# Patient Record
Sex: FEMALE | Race: WHITE | NOT HISPANIC OR LATINO | Employment: PART TIME | URBAN - METROPOLITAN AREA
[De-identification: names, ages, dates, MRNs, and addresses within clinical notes are randomized per-mention and may not be internally consistent; named-entity substitution may affect disease eponyms.]

---

## 2017-02-16 ENCOUNTER — TELEPHONE (OUTPATIENT)
Dept: INTERNAL MEDICINE | Facility: CLINIC | Age: 77
End: 2017-02-16

## 2017-02-16 RX ORDER — HYDROCODONE BITARTRATE AND ACETAMINOPHEN 5; 325 MG/1; MG/1
1 TABLET ORAL EVERY 6 HOURS PRN
Qty: 30 TABLET | Refills: 0 | Status: SHIPPED | OUTPATIENT
Start: 2017-02-16 | End: 2017-06-29 | Stop reason: SDUPTHER

## 2017-03-16 RX ORDER — MELOXICAM 15 MG/1
TABLET ORAL
Qty: 30 TABLET | Refills: 0 | Status: SHIPPED | OUTPATIENT
Start: 2017-03-16 | End: 2017-06-29 | Stop reason: SDUPTHER

## 2017-03-28 ENCOUNTER — OFFICE VISIT (OUTPATIENT)
Dept: ORTHOPEDIC SURGERY | Facility: CLINIC | Age: 77
End: 2017-03-28

## 2017-03-28 VITALS — WEIGHT: 118 LBS | HEIGHT: 62 IN | TEMPERATURE: 98.9 F | BODY MASS INDEX: 21.71 KG/M2

## 2017-03-28 DIAGNOSIS — M19.90 ARTHRITIS: Primary | ICD-10-CM

## 2017-03-28 PROCEDURE — 20610 DRAIN/INJ JOINT/BURSA W/O US: CPT | Performed by: ORTHOPAEDIC SURGERY

## 2017-03-28 RX ADMIN — BUPIVACAINE HYDROCHLORIDE 4 ML: 5 INJECTION, SOLUTION PERINEURAL at 12:18

## 2017-03-28 RX ADMIN — METHYLPREDNISOLONE ACETATE 80 MG: 80 INJECTION, SUSPENSION INTRA-ARTICULAR; INTRALESIONAL; INTRAMUSCULAR; SOFT TISSUE at 12:18

## 2017-03-28 RX ADMIN — BUPIVACAINE HYDROCHLORIDE 4 ML: 5 INJECTION, SOLUTION PERINEURAL at 12:23

## 2017-03-28 RX ADMIN — METHYLPREDNISOLONE ACETATE 80 MG: 80 INJECTION, SUSPENSION INTRA-ARTICULAR; INTRALESIONAL; INTRAMUSCULAR; SOFT TISSUE at 12:23

## 2017-03-28 NOTE — PROGRESS NOTES
"BILATERALKnee Joint Injection      Patient: Trice Melara        YOB: 1940            Chief Complaints:BILATERAL Knee pain      History of Present Illness: Pt gets intermittent  injections with good relief. Is here for repeat injection. Understands options.  Chief Complaint   Patient presents with   • Left Knee - Follow-up, Pain   • Right Knee - Follow-up, Pain         Physical Exam: 76 y.o. female  General Appearance:    Alert, cooperative, in no acute distress                   Vitals:    03/28/17 1216   Temp: 98.9 °F (37.2 °C)   Weight: 118 lb (53.5 kg)   Height: 62\" (157.5 cm)      Patient is alert and read ×3 no acute distress appears her above-listed at height weight and age.  Affect is normal respiratory rate is normal unlabored. Heart rate regular rate rhythm, sclera, dentition and hearing are normal for the purpose of this exam.  Exam and complaints are unchanged.      Procedure:  Large Joint Arthrocentesis  Date/Time: 3/28/2017 12:18 PM  Consent given by: patient  Timeout: Immediately prior to procedure a time out was called to verify the correct patient, procedure, equipment, support staff and site/side marked as required   Supporting Documentation  Indications: pain   Procedure Details  Location: knee - R knee  Needle size: 18 G  Approach: anteromedial  Medications administered: 4 mL bupivacaine; 80 mg methylPREDNISolone acetate 80 MG/ML      Large Joint Arthrocentesis  Date/Time: 3/28/2017 12:23 PM  Consent given by: patient  Site marked: site marked  Timeout: Immediately prior to procedure a time out was called to verify the correct patient, procedure, equipment, support staff and site/side marked as required   Supporting Documentation  Indications: pain   Procedure Details  Location: knee - L knee  Needle size: 18 G  Approach: anteromedial  Medications administered: 4 mL bupivacaine; 80 mg methylPREDNISolone acetate 80 MG/ML                  Assessment. Persistent knee pain      Plan: Is " to proceed with injection    The knee joint was injected under strict sterile technique with Marcaine and Depo-Medrol this was done sterilely and tolerated tolerated well.

## 2017-03-29 RX ORDER — BUPIVACAINE HYDROCHLORIDE 5 MG/ML
4 INJECTION, SOLUTION PERINEURAL
Status: COMPLETED | OUTPATIENT
Start: 2017-03-28 | End: 2017-03-28

## 2017-03-29 RX ORDER — METHYLPREDNISOLONE ACETATE 80 MG/ML
80 INJECTION, SUSPENSION INTRA-ARTICULAR; INTRALESIONAL; INTRAMUSCULAR; SOFT TISSUE
Status: COMPLETED | OUTPATIENT
Start: 2017-03-28 | End: 2017-03-28

## 2017-06-27 ENCOUNTER — CLINICAL SUPPORT (OUTPATIENT)
Dept: ORTHOPEDIC SURGERY | Facility: CLINIC | Age: 77
End: 2017-06-27

## 2017-06-27 VITALS — WEIGHT: 129.4 LBS | TEMPERATURE: 98.6 F | BODY MASS INDEX: 23.81 KG/M2 | HEIGHT: 62 IN

## 2017-06-27 DIAGNOSIS — M17.10 OSTEOARTHROSIS, LOCALIZED, PRIMARY, KNEE, UNSPECIFIED LATERALITY: Primary | ICD-10-CM

## 2017-06-27 PROCEDURE — 20610 DRAIN/INJ JOINT/BURSA W/O US: CPT | Performed by: ORTHOPAEDIC SURGERY

## 2017-06-27 RX ADMIN — METHYLPREDNISOLONE ACETATE 80 MG: 80 INJECTION, SUSPENSION INTRA-ARTICULAR; INTRALESIONAL; INTRAMUSCULAR; SOFT TISSUE at 09:56

## 2017-06-27 RX ADMIN — BUPIVACAINE HYDROCHLORIDE 4 ML: 5 INJECTION, SOLUTION EPIDURAL; INTRACAUDAL at 09:56

## 2017-06-27 NOTE — PROGRESS NOTES
"Knee Joint Injection      Patient: Trice Melara        YOB: 1940            Chief Complaints: Knee pain bilateral      History of Present Illness: Pt gets intermittent  injections with good relief. Is here for repeat injection. Understands options.      Physical Exam: 76 y.o. female  General Appearance:    Alert, cooperative, in no acute distress                   Vitals:    06/27/17 1229   Temp: 98.6 °F (37 °C)   Weight: 129 lb 6.4 oz (58.7 kg)   Height: 62\" (157.5 cm)      Patient is alert and read ×3 no acute distress appears her above-listed at height weight and age.  Affect is normal respiratory rate is normal unlabored. Heart rate regular rate rhythm, sclera, dentition and hearing are normal for the purpose of this exam.  Exam and complaints are unchanged.      Procedure:  Large Joint Arthrocentesis  Date/Time: 6/27/2017 9:56 AM  Consent given by: patient  Site marked: site marked  Timeout: Immediately prior to procedure a time out was called to verify the correct patient, procedure, equipment, support staff and site/side marked as required   Supporting Documentation  Indications: pain   Procedure Details  Location: knee - R knee  Needle size: 25 G  Approach: anteromedial  Medications administered: 80 mg methylPREDNISolone acetate 80 MG/ML; 4 mL bupivacaine (PF) 0.5 %      Large Joint Arthrocentesis  Date/Time: 6/27/2017 9:56 AM  Consent given by: patient  Site marked: site marked  Timeout: Immediately prior to procedure a time out was called to verify the correct patient, procedure, equipment, support staff and site/side marked as required   Supporting Documentation  Indications: pain   Procedure Details  Location: knee - L knee  Needle size: 25 G  Approach: anteromedial  Medications administered: 80 mg methylPREDNISolone acetate 80 MG/ML; 4 mL bupivacaine (PF) 0.5 %                  Assessment. Persistent knee pain      Plan: Is to proceed with injection    The knee joint was injected under " strict sterile technique with Marcaine and Depo-Medrol this was done sterilely and tolerated tolerated well.She would like to talk to Dr. Glover about possible knee replacement

## 2017-06-27 NOTE — PROGRESS NOTES
".  BILATERAL Knee Follow Up      Patient: Trice Melara        YOB: 1940            Chief Complaints: Bilateralknee pain      History of Present Illness:      Physical Exam: 76 y.o. female  General Appearance:    Alert, cooperative, in no acute distress                   Vitals:    06/27/17 1229   Temp: 98.6 °F (37 °C)   Weight: 129 lb 6.4 oz (58.7 kg)   Height: 62\" (157.5 cm)        Patient is alert and read ×3 no acute distress appears her above-listed at height weight and age.  Affect is normal respiratory rate is normal unlabored. Heart rate regular rate rhythm, sclera, dentition and hearing are normal for the purpose of this exam.      Ortho Exam                 Assessment/Plan:              "

## 2017-06-29 ENCOUNTER — OFFICE VISIT (OUTPATIENT)
Dept: INTERNAL MEDICINE | Facility: CLINIC | Age: 77
End: 2017-06-29

## 2017-06-29 VITALS
RESPIRATION RATE: 18 BRPM | DIASTOLIC BLOOD PRESSURE: 76 MMHG | OXYGEN SATURATION: 97 % | BODY MASS INDEX: 23.55 KG/M2 | HEART RATE: 91 BPM | SYSTOLIC BLOOD PRESSURE: 128 MMHG | WEIGHT: 128 LBS | HEIGHT: 62 IN

## 2017-06-29 DIAGNOSIS — R79.89 ABNORMAL CBC: ICD-10-CM

## 2017-06-29 DIAGNOSIS — Z51.81 MEDICATION MONITORING ENCOUNTER: ICD-10-CM

## 2017-06-29 DIAGNOSIS — M17.0 PRIMARY OSTEOARTHRITIS OF KNEES, BILATERAL: Primary | ICD-10-CM

## 2017-06-29 DIAGNOSIS — Z12.31 ENCOUNTER FOR SCREENING MAMMOGRAM FOR BREAST CANCER: ICD-10-CM

## 2017-06-29 DIAGNOSIS — Z23 NEED FOR PNEUMOCOCCAL VACCINATION: ICD-10-CM

## 2017-06-29 LAB
ALBUMIN SERPL-MCNC: 4.5 G/DL (ref 3.5–5.2)
ALBUMIN/GLOB SERPL: 1.7 G/DL
ALP SERPL-CCNC: 62 U/L (ref 39–117)
ALT SERPL-CCNC: 16 U/L (ref 1–33)
AST SERPL-CCNC: 20 U/L (ref 1–32)
BASOPHILS # BLD AUTO: 0.02 10*3/MM3 (ref 0–0.2)
BASOPHILS NFR BLD AUTO: 0.1 % (ref 0–1.5)
BILIRUB SERPL-MCNC: 0.3 MG/DL (ref 0.1–1.2)
BUN SERPL-MCNC: 22 MG/DL (ref 8–23)
BUN/CREAT SERPL: 25.9 (ref 7–25)
CALCIUM SERPL-MCNC: 10 MG/DL (ref 8.6–10.5)
CHLORIDE SERPL-SCNC: 100 MMOL/L (ref 98–107)
CO2 SERPL-SCNC: 29.2 MMOL/L (ref 22–29)
CREAT SERPL-MCNC: 0.85 MG/DL (ref 0.57–1)
EOSINOPHIL # BLD AUTO: 0.02 10*3/MM3 (ref 0–0.7)
EOSINOPHIL NFR BLD AUTO: 0.1 % (ref 0.3–6.2)
ERYTHROCYTE [DISTWIDTH] IN BLOOD BY AUTOMATED COUNT: 14 % (ref 11.7–13)
GLOBULIN SER CALC-MCNC: 2.7 GM/DL
GLUCOSE SERPL-MCNC: 91 MG/DL (ref 65–99)
HCT VFR BLD AUTO: 43.6 % (ref 35.6–45.5)
HGB BLD-MCNC: 13.6 G/DL (ref 11.9–15.5)
IMM GRANULOCYTES # BLD: 0.06 10*3/MM3 (ref 0–0.03)
IMM GRANULOCYTES NFR BLD: 0.4 % (ref 0–0.5)
LYMPHOCYTES # BLD AUTO: 1.1 10*3/MM3 (ref 0.9–4.8)
LYMPHOCYTES NFR BLD AUTO: 7.6 % (ref 19.6–45.3)
MCH RBC QN AUTO: 28.5 PG (ref 26.9–32)
MCHC RBC AUTO-ENTMCNC: 31.2 G/DL (ref 32.4–36.3)
MCV RBC AUTO: 91.4 FL (ref 80.5–98.2)
MONOCYTES # BLD AUTO: 0.87 10*3/MM3 (ref 0.2–1.2)
MONOCYTES NFR BLD AUTO: 6 % (ref 5–12)
NEUTROPHILS # BLD AUTO: 12.49 10*3/MM3 (ref 1.9–8.1)
NEUTROPHILS NFR BLD AUTO: 85.8 % (ref 42.7–76)
PLATELET # BLD AUTO: 277 10*3/MM3 (ref 140–500)
POTASSIUM SERPL-SCNC: 4.7 MMOL/L (ref 3.5–5.2)
PROT SERPL-MCNC: 7.2 G/DL (ref 6–8.5)
RBC # BLD AUTO: 4.77 10*6/MM3 (ref 3.9–5.2)
SODIUM SERPL-SCNC: 143 MMOL/L (ref 136–145)
WBC # BLD AUTO: 14.56 10*3/MM3 (ref 4.5–10.7)

## 2017-06-29 PROCEDURE — 90670 PCV13 VACCINE IM: CPT | Performed by: INTERNAL MEDICINE

## 2017-06-29 PROCEDURE — G0009 ADMIN PNEUMOCOCCAL VACCINE: HCPCS | Performed by: INTERNAL MEDICINE

## 2017-06-29 PROCEDURE — 99214 OFFICE O/P EST MOD 30 MIN: CPT | Performed by: INTERNAL MEDICINE

## 2017-06-29 RX ORDER — BUPIVACAINE HYDROCHLORIDE 5 MG/ML
4 INJECTION, SOLUTION EPIDURAL; INTRACAUDAL
Status: COMPLETED | OUTPATIENT
Start: 2017-06-27 | End: 2017-06-27

## 2017-06-29 RX ORDER — HYDROCODONE BITARTRATE AND ACETAMINOPHEN 5; 325 MG/1; MG/1
1 TABLET ORAL EVERY 6 HOURS PRN
Qty: 30 TABLET | Refills: 0 | Status: ON HOLD | OUTPATIENT
Start: 2017-06-29 | End: 2017-07-27

## 2017-06-29 RX ORDER — METHYLPREDNISOLONE ACETATE 80 MG/ML
80 INJECTION, SUSPENSION INTRA-ARTICULAR; INTRALESIONAL; INTRAMUSCULAR; SOFT TISSUE
Status: COMPLETED | OUTPATIENT
Start: 2017-06-27 | End: 2017-06-27

## 2017-06-29 RX ORDER — MELOXICAM 15 MG/1
TABLET ORAL
Qty: 30 TABLET | Refills: 6 | Status: SHIPPED | OUTPATIENT
Start: 2017-06-29 | End: 2017-07-24 | Stop reason: SDUPTHER

## 2017-06-29 NOTE — PROGRESS NOTES
Chief Complaint  Trice Melara is a 76 y.o. female who presents for Med Refill (Pt wanting her norco refilled, has not been seen in over a year, but her narc was updated in february when she picked it up in office. (Also needs meloxicam refill))  .    History of Present Illness   Trice is here for follow up.  She hasn't been in in over a year.  She really doesn't like coming to the doctor.  She was in the hospital last year.  She had a follow up here and labs were ordered but I don't see any results in the system.  Her knees are her biggest issue.   She takes the hydrocodone in the evening if she is going out.  She might use it once a week.  She is also using meloxicam for her knees daily.  She's been on this for a long time.  She hasn't had any issues with bleeding.  She denies any cp or palp or dizziness.        Review of Systems   Constitutional: Negative for unexpected weight change.   Respiratory: Negative for chest tightness and shortness of breath.    Cardiovascular: Negative for chest pain and palpitations.   Musculoskeletal: Positive for arthralgias.   Neurological: Negative for dizziness and light-headedness.       Patient Active Problem List   Diagnosis   • Gastroesophageal reflux disease   • Knee pain   • Iron deficiency anemia   • Abnormal kidney function   • Acute renal failure   • Arthritis of knee   • Arthritis   • Osteoarthrosis, localized, primary, knee   • Primary osteoarthritis of knees, bilateral       Past Medical History:   Diagnosis Date   • Abdominal wall dehiscence 01/08/2016    Fascial Dehiscence   • Acute renal failure    • Aspiration pneumonia    • Diverticulitis 2010    with Colovesical Fistula   • Gastric ulcer    • GERD (gastroesophageal reflux disease)    • Incisional hernia 01/08/2016   • Osteoarthritis    • Peritonitis 01/2016   • Septic shock    • Small bowel anastomotic leak 01/08/2016       Past Surgical History:   Procedure Laterality Date   • COLON RESECTION  01/08/2016     Smal Bowel Resection x2-Dr. Syd Maharaj   • EXPLORATORY LAPAROTOMY  12/31/2015    With Extensive Adhesiolysis of over 1 hour to allow conduct of the operation and repair of small intestine-Dr. Syd Maharaj   • EXPLORATORY LAPAROTOMY  01/08/2016    Dr. Syd Maharaj   • INCISIONAL HERNIA REPAIR  01/08/2016    Incisional Hernia Repair with Fascial Dehiscence Repair with Mesh and Bilateral Component Separation-Dr. Syd Maharaj   • TOTAL ABDOMINAL HYSTERECTOMY         No family history on file.    Social History     Social History   • Marital status:      Spouse name: N/A   • Number of children: N/A   • Years of education: N/A     Occupational History   • Not on file.     Social History Main Topics   • Smoking status: Current Some Day Smoker     Types: Cigarettes   • Smokeless tobacco: Not on file   • Alcohol use Yes      Comment: rare   • Drug use: No   • Sexual activity: Defer     Other Topics Concern   • Not on file     Social History Narrative       Current Outpatient Prescriptions on File Prior to Visit   Medication Sig Dispense Refill   • acetaminophen (TYLENOL) 325 MG tablet Take 650 mg by mouth every 6 (six) hours as needed for mild pain (1-3).     • Calcium Carbonate Antacid (TUMS PO) Take  by mouth.     • Calcium Carbonate-Vit D-Min (CALCIUM 600+D PLUS MINERALS) 600-400 MG-UNIT chewable tablet Chew daily.     • diphenoxylate-atropine (LOMOTIL) 2.5-0.025 MG per tablet Take 1 tablet by mouth 2 (two) times a day. 1 in the morning  1 @@ night PRN     • omeprazole (PriLOSEC) 20 MG capsule TAKE 1 CAPSULE DAILY 90 capsule 3   • [DISCONTINUED] HYDROcodone-acetaminophen (NORCO) 5-325 MG per tablet Take 1 tablet by mouth Every 6 (Six) Hours As Needed for moderate pain (4-6). 30 tablet 0   • [DISCONTINUED] meloxicam (MOBIC) 15 MG tablet TAKE 1 TABLET BY MOUTH DAILY. PT NEEDS TO BE SEEN FOR MORE REFILLS. 30 tablet 0     No current facility-administered medications on file prior to visit.        No Known  "Allergies    Vitals:    06/29/17 1015   BP: 128/76   Pulse: 91   Resp: 18   SpO2: 97%   Weight: 128 lb (58.1 kg)   Height: 62\" (157.5 cm)       Body mass index is 23.41 kg/(m^2).    Objective   Physical Exam   Constitutional: She is oriented to person, place, and time. She appears well-developed and well-nourished. No distress.   HENT:   Head: Normocephalic and atraumatic.   Eyes: Conjunctivae are normal. No scleral icterus.   Neck: Normal range of motion. Neck supple.   Cardiovascular: Normal rate, regular rhythm and normal heart sounds.  Exam reveals no gallop and no friction rub.    No murmur heard.  Pulmonary/Chest: Effort normal and breath sounds normal. No respiratory distress. She has no wheezes. She has no rales.   Musculoskeletal: She exhibits no edema.   Lymphadenopathy:     She has no cervical adenopathy.   Neurological: She is alert and oriented to person, place, and time. No cranial nerve deficit.   Psychiatric: She has a normal mood and affect. Her behavior is normal. Judgment and thought content normal.           Assessment/Plan   Diagnoses and all orders for this visit:    Abnormal CBC  -     CBC & Differential    Encounter for screening mammogram for breast cancer  -     Mammo Screening Bilateral With CAD; Future    Primary osteoarthritis of knees, bilateral    Medication monitoring encounter  -     Comprehensive Metabolic Panel  -     CBC & Differential    Need for pneumococcal vaccination  -     Pneumococcal Conjugate Vaccine 13-[facility]    Other orders  -     HYDROcodone-acetaminophen (NORCO) 5-325 MG per tablet; Take 1 tablet by mouth Every 6 (Six) Hours As Needed for Moderate Pain (4-6).  -     meloxicam (MOBIC) 15 MG tablet; TAKE 1 TABLET BY MOUTH DAILY. PT NEEDS TO BE SEEN FOR MORE REFILLS.      Discussion/Summary  Trice is here for follow up.   She is generally doing very well.  No new issues.  She is due for labs and for a mammo.  I have put that order in.  Prevnar today.  She sees  " Kavon regularly for her knees.  She uses the hydrocodone sparingly without any side effect.  I am concerned about her daily use of meloxicam with her hx of GIB and the potential to affect kidney function over time.  I had ordered labs oin 3/2016 at her follow up but we never got any results.  Lab Patricia is looking into whether these were actually done and if there are labs available.  Will check labs today.  She did have them drawn.      Return in about 1 year (around 6/29/2018) for Annual physical, with fasting labs prior.

## 2017-06-30 ENCOUNTER — TELEPHONE (OUTPATIENT)
Dept: INTERNAL MEDICINE | Facility: CLINIC | Age: 77
End: 2017-06-30

## 2017-06-30 DIAGNOSIS — D72.829 ELEVATED WHITE BLOOD CELL COUNT, UNSPECIFIED: Primary | ICD-10-CM

## 2017-07-13 ENCOUNTER — HOSPITAL ENCOUNTER (OUTPATIENT)
Dept: MAMMOGRAPHY | Facility: HOSPITAL | Age: 77
Discharge: HOME | End: 2017-07-13
Admitting: INTERNAL MEDICINE

## 2017-07-13 DIAGNOSIS — Z12.31 ENCOUNTER FOR SCREENING MAMMOGRAM FOR BREAST CANCER: ICD-10-CM

## 2017-07-13 PROCEDURE — G0202 SCR MAMMO BI INCL CAD: HCPCS

## 2017-07-24 ENCOUNTER — HOSPITAL ENCOUNTER (INPATIENT)
Facility: HOSPITAL | Age: 77
LOS: 3 days | Discharge: HOME HEALTH | End: 2017-07-27
Attending: EMERGENCY MEDICINE | Admitting: INTERNAL MEDICINE

## 2017-07-24 ENCOUNTER — ANESTHESIA EVENT (OUTPATIENT)
Dept: PERIOP | Facility: HOSPITAL | Age: 77
End: 2017-07-24

## 2017-07-24 ENCOUNTER — APPOINTMENT (OUTPATIENT)
Dept: GENERAL RADIOLOGY | Facility: HOSPITAL | Age: 77
End: 2017-07-24

## 2017-07-24 ENCOUNTER — ANESTHESIA (OUTPATIENT)
Dept: PERIOP | Facility: HOSPITAL | Age: 77
End: 2017-07-24

## 2017-07-24 DIAGNOSIS — R26.2 DIFFICULTY WALKING: ICD-10-CM

## 2017-07-24 DIAGNOSIS — S72.141A CLOSED DISPLACED INTERTROCHANTERIC FRACTURE OF RIGHT FEMUR, INITIAL ENCOUNTER (HCC): Primary | ICD-10-CM

## 2017-07-24 PROBLEM — M81.0 OSTEOPOROSIS: Status: ACTIVE | Noted: 2017-07-24
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
## 2017-07-24 LAB
ALBUMIN SERPL-MCNC: 4 G/DL (ref 3.5–5.2)
ALBUMIN/GLOB SERPL: 1.3 G/DL
ALP SERPL-CCNC: 62 U/L (ref 39–117)
ALT SERPL W P-5'-P-CCNC: 19 U/L (ref 1–33)
ANION GAP SERPL CALCULATED.3IONS-SCNC: 10.9 MMOL/L
AST SERPL-CCNC: 18 U/L (ref 1–32)
BASOPHILS # BLD AUTO: 0.04 10*3/MM3 (ref 0–0.2)
BASOPHILS NFR BLD AUTO: 0.5 % (ref 0–1.5)
BILIRUB SERPL-MCNC: 0.4 MG/DL (ref 0.1–1.2)
BUN BLD-MCNC: 15 MG/DL (ref 8–23)
BUN/CREAT SERPL: 21.1 (ref 7–25)
CALCIUM SPEC-SCNC: 9.6 MG/DL (ref 8.6–10.5)
CHLORIDE SERPL-SCNC: 101 MMOL/L (ref 98–107)
CO2 SERPL-SCNC: 30.1 MMOL/L (ref 22–29)
CREAT BLD-MCNC: 0.71 MG/DL (ref 0.57–1)
DEPRECATED RDW RBC AUTO: 45.8 FL (ref 37–54)
EOSINOPHIL # BLD AUTO: 0.25 10*3/MM3 (ref 0–0.7)
EOSINOPHIL NFR BLD AUTO: 3.2 % (ref 0.3–6.2)
ERYTHROCYTE [DISTWIDTH] IN BLOOD BY AUTOMATED COUNT: 13.7 % (ref 11.7–13)
GFR SERPL CREATININE-BSD FRML MDRD: 79 ML/MIN/1.73
GLOBULIN UR ELPH-MCNC: 3.1 GM/DL
GLUCOSE BLD-MCNC: 106 MG/DL (ref 65–99)
HCT VFR BLD AUTO: 44.4 % (ref 35.6–45.5)
HGB BLD-MCNC: 13.9 G/DL (ref 11.9–15.5)
IMM GRANULOCYTES # BLD: 0.07 10*3/MM3 (ref 0–0.03)
IMM GRANULOCYTES NFR BLD: 0.9 % (ref 0–0.5)
INR PPP: 0.98 (ref 0.9–1.1)
LYMPHOCYTES # BLD AUTO: 1.47 10*3/MM3 (ref 0.9–4.8)
LYMPHOCYTES NFR BLD AUTO: 18.7 % (ref 19.6–45.3)
MCH RBC QN AUTO: 28.4 PG (ref 26.9–32)
MCHC RBC AUTO-ENTMCNC: 31.3 G/DL (ref 32.4–36.3)
MCV RBC AUTO: 90.8 FL (ref 80.5–98.2)
MONOCYTES # BLD AUTO: 0.7 10*3/MM3 (ref 0.2–1.2)
MONOCYTES NFR BLD AUTO: 8.9 % (ref 5–12)
NEUTROPHILS # BLD AUTO: 5.33 10*3/MM3 (ref 1.9–8.1)
NEUTROPHILS NFR BLD AUTO: 67.8 % (ref 42.7–76)
PLATELET # BLD AUTO: 173 10*3/MM3 (ref 140–500)
PMV BLD AUTO: 10.1 FL (ref 6–12)
POTASSIUM BLD-SCNC: 3.7 MMOL/L (ref 3.5–5.2)
PROT SERPL-MCNC: 7.1 G/DL (ref 6–8.5)
PROTHROMBIN TIME: 12.6 SECONDS (ref 11.7–14.2)
RBC # BLD AUTO: 4.89 10*6/MM3 (ref 3.9–5.2)
SODIUM BLD-SCNC: 142 MMOL/L (ref 136–145)
WBC NRBC COR # BLD: 7.86 10*3/MM3 (ref 4.5–10.7)

## 2017-07-24 PROCEDURE — 99222 1ST HOSP IP/OBS MODERATE 55: CPT | Performed by: ORTHOPAEDIC SURGERY

## 2017-07-24 PROCEDURE — 25010000003 CEFAZOLIN IN DEXTROSE 2-4 GM/100ML-% SOLUTION: Performed by: ORTHOPAEDIC SURGERY

## 2017-07-24 PROCEDURE — 27245 TREAT THIGH FRACTURE: CPT | Performed by: ORTHOPAEDIC SURGERY

## 2017-07-24 PROCEDURE — 0QSB06Z REPOSITION RIGHT LOWER FEMUR WITH INTRAMEDULLARY INTERNAL FIXATION DEVICE, OPEN APPROACH: ICD-10-PCS | Performed by: ORTHOPAEDIC SURGERY

## 2017-07-24 PROCEDURE — 85610 PROTHROMBIN TIME: CPT | Performed by: PHYSICIAN ASSISTANT

## 2017-07-24 PROCEDURE — 73502 X-RAY EXAM HIP UNI 2-3 VIEWS: CPT

## 2017-07-24 PROCEDURE — C1713 ANCHOR/SCREW BN/BN,TIS/BN: HCPCS | Performed by: ORTHOPAEDIC SURGERY

## 2017-07-24 PROCEDURE — 25010000002 FENTANYL CITRATE (PF) 100 MCG/2ML SOLUTION: Performed by: ANESTHESIOLOGY

## 2017-07-24 PROCEDURE — 94799 UNLISTED PULMONARY SVC/PX: CPT

## 2017-07-24 PROCEDURE — 80053 COMPREHEN METABOLIC PANEL: CPT | Performed by: PHYSICIAN ASSISTANT

## 2017-07-24 PROCEDURE — 25010000002 DEXAMETHASONE PER 1 MG: Performed by: ANESTHESIOLOGY

## 2017-07-24 PROCEDURE — 76000 FLUOROSCOPY <1 HR PHYS/QHP: CPT

## 2017-07-24 PROCEDURE — 85025 COMPLETE CBC W/AUTO DIFF WBC: CPT | Performed by: PHYSICIAN ASSISTANT

## 2017-07-24 PROCEDURE — 25010000002 PROPOFOL 10 MG/ML EMULSION: Performed by: ANESTHESIOLOGY

## 2017-07-24 PROCEDURE — 71010 HC CHEST PA OR AP: CPT

## 2017-07-24 PROCEDURE — 25010000002 MORPHINE PER 10 MG: Performed by: EMERGENCY MEDICINE

## 2017-07-24 PROCEDURE — 25010000002 MIDAZOLAM PER 1 MG: Performed by: ANESTHESIOLOGY

## 2017-07-24 PROCEDURE — 25010000002 FENTANYL CITRATE (PF) 100 MCG/2ML SOLUTION

## 2017-07-24 PROCEDURE — 99284 EMERGENCY DEPT VISIT MOD MDM: CPT

## 2017-07-24 DEVICE — SCRW CANN TFN ADV TI 10.35X95MM STRL: Type: IMPLANTABLE DEVICE | Site: FEMUR | Status: FUNCTIONAL

## 2017-07-24 DEVICE — IMPLANTABLE DEVICE: Type: IMPLANTABLE DEVICE | Site: FEMUR | Status: FUNCTIONAL

## 2017-07-24 RX ORDER — PROMETHAZINE HYDROCHLORIDE 25 MG/1
25 TABLET ORAL ONCE AS NEEDED
Status: DISCONTINUED | OUTPATIENT
Start: 2017-07-24 | End: 2017-07-24 | Stop reason: HOSPADM

## 2017-07-24 RX ORDER — PROMETHAZINE HYDROCHLORIDE 25 MG/ML
12.5 INJECTION, SOLUTION INTRAMUSCULAR; INTRAVENOUS ONCE AS NEEDED
Status: DISCONTINUED | OUTPATIENT
Start: 2017-07-24 | End: 2017-07-24 | Stop reason: HOSPADM

## 2017-07-24 RX ORDER — DIPHENHYDRAMINE HYDROCHLORIDE 50 MG/ML
25 INJECTION INTRAMUSCULAR; INTRAVENOUS EVERY 6 HOURS PRN
Status: DISCONTINUED | OUTPATIENT
Start: 2017-07-24 | End: 2017-07-27 | Stop reason: HOSPADM

## 2017-07-24 RX ORDER — HYDROMORPHONE HYDROCHLORIDE 1 MG/ML
0.2 INJECTION, SOLUTION INTRAMUSCULAR; INTRAVENOUS; SUBCUTANEOUS
Status: DISCONTINUED | OUTPATIENT
Start: 2017-07-24 | End: 2017-07-24 | Stop reason: HOSPADM

## 2017-07-24 RX ORDER — SODIUM CHLORIDE 0.9 % (FLUSH) 0.9 %
1-10 SYRINGE (ML) INJECTION AS NEEDED
Status: DISCONTINUED | OUTPATIENT
Start: 2017-07-24 | End: 2017-07-24 | Stop reason: HOSPADM

## 2017-07-24 RX ORDER — SODIUM CHLORIDE 0.9 % (FLUSH) 0.9 %
10 SYRINGE (ML) INJECTION AS NEEDED
Status: DISCONTINUED | OUTPATIENT
Start: 2017-07-24 | End: 2017-07-27 | Stop reason: HOSPADM

## 2017-07-24 RX ORDER — ACETAMINOPHEN 325 MG/1
325 TABLET ORAL EVERY 4 HOURS PRN
Status: DISCONTINUED | OUTPATIENT
Start: 2017-07-24 | End: 2017-07-27 | Stop reason: HOSPADM

## 2017-07-24 RX ORDER — SODIUM CHLORIDE 0.9 % (FLUSH) 0.9 %
1-10 SYRINGE (ML) INJECTION AS NEEDED
Status: DISCONTINUED | OUTPATIENT
Start: 2017-07-24 | End: 2017-07-27 | Stop reason: HOSPADM

## 2017-07-24 RX ORDER — ONDANSETRON 4 MG/1
4 TABLET, FILM COATED ORAL EVERY 6 HOURS PRN
Status: DISCONTINUED | OUTPATIENT
Start: 2017-07-24 | End: 2017-07-27 | Stop reason: HOSPADM

## 2017-07-24 RX ORDER — PROMETHAZINE HYDROCHLORIDE 25 MG/1
25 SUPPOSITORY RECTAL ONCE AS NEEDED
Status: DISCONTINUED | OUTPATIENT
Start: 2017-07-24 | End: 2017-07-24 | Stop reason: HOSPADM

## 2017-07-24 RX ORDER — ACETAMINOPHEN 325 MG/1
650 TABLET ORAL EVERY 4 HOURS PRN
Status: DISCONTINUED | OUTPATIENT
Start: 2017-07-24 | End: 2017-07-27 | Stop reason: HOSPADM

## 2017-07-24 RX ORDER — PROMETHAZINE HYDROCHLORIDE 25 MG/ML
6.25 INJECTION, SOLUTION INTRAMUSCULAR; INTRAVENOUS ONCE AS NEEDED
Status: DISCONTINUED | OUTPATIENT
Start: 2017-07-24 | End: 2017-07-24 | Stop reason: HOSPADM

## 2017-07-24 RX ORDER — SODIUM CHLORIDE, SODIUM LACTATE, POTASSIUM CHLORIDE, CALCIUM CHLORIDE 600; 310; 30; 20 MG/100ML; MG/100ML; MG/100ML; MG/100ML
9 INJECTION, SOLUTION INTRAVENOUS CONTINUOUS
Status: DISCONTINUED | OUTPATIENT
Start: 2017-07-24 | End: 2017-07-26

## 2017-07-24 RX ORDER — PANTOPRAZOLE SODIUM 40 MG/1
40 TABLET, DELAYED RELEASE ORAL EVERY MORNING
Status: DISCONTINUED | OUTPATIENT
Start: 2017-07-25 | End: 2017-07-27 | Stop reason: HOSPADM

## 2017-07-24 RX ORDER — MIDAZOLAM HYDROCHLORIDE 1 MG/ML
2 INJECTION INTRAMUSCULAR; INTRAVENOUS
Status: DISCONTINUED | OUTPATIENT
Start: 2017-07-24 | End: 2017-07-24 | Stop reason: HOSPADM

## 2017-07-24 RX ORDER — ONDANSETRON 2 MG/ML
4 INJECTION INTRAMUSCULAR; INTRAVENOUS EVERY 6 HOURS PRN
Status: DISCONTINUED | OUTPATIENT
Start: 2017-07-24 | End: 2017-07-27 | Stop reason: HOSPADM

## 2017-07-24 RX ORDER — FENTANYL CITRATE 50 UG/ML
50 INJECTION, SOLUTION INTRAMUSCULAR; INTRAVENOUS
Status: DISCONTINUED | OUTPATIENT
Start: 2017-07-24 | End: 2017-07-24 | Stop reason: HOSPADM

## 2017-07-24 RX ORDER — HYDROCODONE BITARTRATE AND ACETAMINOPHEN 5; 325 MG/1; MG/1
2 TABLET ORAL EVERY 4 HOURS PRN
Status: DISCONTINUED | OUTPATIENT
Start: 2017-07-24 | End: 2017-07-27 | Stop reason: HOSPADM

## 2017-07-24 RX ORDER — CALCIUM CARBONATE 200(500)MG
1 TABLET,CHEWABLE ORAL NIGHTLY
Status: DISCONTINUED | OUTPATIENT
Start: 2017-07-24 | End: 2017-07-27 | Stop reason: HOSPADM

## 2017-07-24 RX ORDER — DIPHENHYDRAMINE HCL 25 MG
25 CAPSULE ORAL EVERY 6 HOURS PRN
Status: DISCONTINUED | OUTPATIENT
Start: 2017-07-24 | End: 2017-07-27 | Stop reason: HOSPADM

## 2017-07-24 RX ORDER — CEFAZOLIN SODIUM 2 G/100ML
2 INJECTION, SOLUTION INTRAVENOUS ONCE
Status: COMPLETED | OUTPATIENT
Start: 2017-07-24 | End: 2017-07-24

## 2017-07-24 RX ORDER — HYDROCODONE BITARTRATE AND ACETAMINOPHEN 5; 325 MG/1; MG/1
1 TABLET ORAL EVERY 6 HOURS PRN
Status: DISCONTINUED | OUTPATIENT
Start: 2017-07-24 | End: 2017-07-27 | Stop reason: HOSPADM

## 2017-07-24 RX ORDER — CEFAZOLIN SODIUM 2 G/100ML
2 INJECTION, SOLUTION INTRAVENOUS EVERY 8 HOURS
Status: COMPLETED | OUTPATIENT
Start: 2017-07-25 | End: 2017-07-25

## 2017-07-24 RX ORDER — FENTANYL CITRATE 50 UG/ML
25 INJECTION, SOLUTION INTRAMUSCULAR; INTRAVENOUS
Status: DISCONTINUED | OUTPATIENT
Start: 2017-07-24 | End: 2017-07-24 | Stop reason: HOSPADM

## 2017-07-24 RX ORDER — MAGNESIUM HYDROXIDE 1200 MG/15ML
LIQUID ORAL AS NEEDED
Status: DISCONTINUED | OUTPATIENT
Start: 2017-07-24 | End: 2017-07-24 | Stop reason: HOSPADM

## 2017-07-24 RX ORDER — CALCIUM CARBONATE 200(500)MG
1 TABLET,CHEWABLE ORAL NIGHTLY
COMMUNITY
End: 2017-07-27 | Stop reason: HOSPADM

## 2017-07-24 RX ORDER — FENTANYL CITRATE 50 UG/ML
INJECTION, SOLUTION INTRAMUSCULAR; INTRAVENOUS
Status: COMPLETED
Start: 2017-07-24 | End: 2017-07-24

## 2017-07-24 RX ORDER — OMEPRAZOLE 20 MG/1
20 CAPSULE, DELAYED RELEASE ORAL
COMMUNITY
End: 2017-07-27 | Stop reason: HOSPADM

## 2017-07-24 RX ORDER — FENTANYL CITRATE 50 UG/ML
INJECTION, SOLUTION INTRAMUSCULAR; INTRAVENOUS AS NEEDED
Status: DISCONTINUED | OUTPATIENT
Start: 2017-07-24 | End: 2017-07-24 | Stop reason: SURG

## 2017-07-24 RX ORDER — MELOXICAM 15 MG/1
15 TABLET ORAL DAILY PRN
COMMUNITY
End: 2017-07-27 | Stop reason: HOSPADM

## 2017-07-24 RX ORDER — SODIUM CHLORIDE 9 MG/ML
75 INJECTION, SOLUTION INTRAVENOUS CONTINUOUS
Status: DISCONTINUED | OUTPATIENT
Start: 2017-07-24 | End: 2017-07-26

## 2017-07-24 RX ORDER — NALOXONE HCL 0.4 MG/ML
0.1 VIAL (ML) INJECTION
Status: DISCONTINUED | OUTPATIENT
Start: 2017-07-24 | End: 2017-07-27 | Stop reason: HOSPADM

## 2017-07-24 RX ORDER — PHENOL 1.4 %
1200 AEROSOL, SPRAY (ML) MUCOUS MEMBRANE DAILY
COMMUNITY
End: 2017-07-27 | Stop reason: HOSPADM

## 2017-07-24 RX ORDER — DIPHENHYDRAMINE HCL 25 MG
25 CAPSULE ORAL 2 TIMES DAILY
Status: DISCONTINUED | OUTPATIENT
Start: 2017-07-24 | End: 2017-07-27 | Stop reason: HOSPADM

## 2017-07-24 RX ORDER — HYDROCODONE BITARTRATE AND ACETAMINOPHEN 5; 325 MG/1; MG/1
1 TABLET ORAL EVERY 6 HOURS PRN
Status: DISCONTINUED | OUTPATIENT
Start: 2017-07-24 | End: 2017-07-24 | Stop reason: SDUPTHER

## 2017-07-24 RX ORDER — ASPIRIN 325 MG
325 TABLET, DELAYED RELEASE (ENTERIC COATED) ORAL DAILY
Status: DISCONTINUED | OUTPATIENT
Start: 2017-07-25 | End: 2017-07-27 | Stop reason: HOSPADM

## 2017-07-24 RX ORDER — MORPHINE SULFATE 2 MG/ML
2 INJECTION, SOLUTION INTRAMUSCULAR; INTRAVENOUS ONCE
Status: COMPLETED | OUTPATIENT
Start: 2017-07-24 | End: 2017-07-24

## 2017-07-24 RX ORDER — DIPHENHYDRAMINE HCL 25 MG
25 CAPSULE ORAL 2 TIMES DAILY PRN
COMMUNITY
End: 2023-03-13

## 2017-07-24 RX ORDER — DEXAMETHASONE SODIUM PHOSPHATE 10 MG/ML
INJECTION INTRAMUSCULAR; INTRAVENOUS AS NEEDED
Status: DISCONTINUED | OUTPATIENT
Start: 2017-07-24 | End: 2017-07-24 | Stop reason: SURG

## 2017-07-24 RX ORDER — BISACODYL 10 MG
10 SUPPOSITORY, RECTAL RECTAL DAILY PRN
Status: DISCONTINUED | OUTPATIENT
Start: 2017-07-24 | End: 2017-07-27 | Stop reason: HOSPADM

## 2017-07-24 RX ORDER — LIDOCAINE HYDROCHLORIDE 20 MG/ML
INJECTION, SOLUTION INFILTRATION; PERINEURAL AS NEEDED
Status: DISCONTINUED | OUTPATIENT
Start: 2017-07-24 | End: 2017-07-24 | Stop reason: SURG

## 2017-07-24 RX ORDER — DIPHENOXYLATE HYDROCHLORIDE AND ATROPINE SULFATE 2.5; .025 MG/1; MG/1
1 TABLET ORAL 4 TIMES DAILY PRN
Status: DISCONTINUED | OUTPATIENT
Start: 2017-07-24 | End: 2017-07-27 | Stop reason: HOSPADM

## 2017-07-24 RX ORDER — UREA 10 %
1250 LOTION (ML) TOPICAL 2 TIMES DAILY
Status: DISCONTINUED | OUTPATIENT
Start: 2017-07-24 | End: 2017-07-27 | Stop reason: HOSPADM

## 2017-07-24 RX ORDER — ACETAMINOPHEN 500 MG
500 TABLET ORAL 2 TIMES DAILY
COMMUNITY
End: 2019-04-30 | Stop reason: HOSPADM

## 2017-07-24 RX ORDER — PROPOFOL 10 MG/ML
VIAL (ML) INTRAVENOUS AS NEEDED
Status: DISCONTINUED | OUTPATIENT
Start: 2017-07-24 | End: 2017-07-24 | Stop reason: SURG

## 2017-07-24 RX ORDER — MIDAZOLAM HYDROCHLORIDE 1 MG/ML
1 INJECTION INTRAMUSCULAR; INTRAVENOUS
Status: DISCONTINUED | OUTPATIENT
Start: 2017-07-24 | End: 2017-07-24 | Stop reason: HOSPADM

## 2017-07-24 RX ORDER — DOCUSATE SODIUM 100 MG/1
100 CAPSULE, LIQUID FILLED ORAL 2 TIMES DAILY PRN
Status: DISCONTINUED | OUTPATIENT
Start: 2017-07-24 | End: 2017-07-27 | Stop reason: HOSPADM

## 2017-07-24 RX ORDER — FAMOTIDINE 10 MG/ML
20 INJECTION, SOLUTION INTRAVENOUS ONCE
Status: COMPLETED | OUTPATIENT
Start: 2017-07-24 | End: 2017-07-24

## 2017-07-24 RX ADMIN — HYDROCODONE BITARTRATE AND ACETAMINOPHEN 1 TABLET: 5; 325 TABLET ORAL at 20:58

## 2017-07-24 RX ADMIN — FENTANYL CITRATE 25 MCG: 50 INJECTION INTRAMUSCULAR; INTRAVENOUS at 17:43

## 2017-07-24 RX ADMIN — FENTANYL CITRATE 25 MCG: 50 INJECTION INTRAMUSCULAR; INTRAVENOUS at 17:25

## 2017-07-24 RX ADMIN — FAMOTIDINE 20 MG: 10 INJECTION INTRAVENOUS at 17:24

## 2017-07-24 RX ADMIN — FENTANYL CITRATE 25 MCG: 50 INJECTION INTRAMUSCULAR; INTRAVENOUS at 18:11

## 2017-07-24 RX ADMIN — CEFAZOLIN SODIUM 2 G: 2 INJECTION, SOLUTION INTRAVENOUS at 17:57

## 2017-07-24 RX ADMIN — FENTANYL CITRATE 50 MCG: 50 INJECTION INTRAMUSCULAR; INTRAVENOUS at 19:35

## 2017-07-24 RX ADMIN — LIDOCAINE HYDROCHLORIDE 50 MG: 20 INJECTION, SOLUTION INFILTRATION; PERINEURAL at 17:43

## 2017-07-24 RX ADMIN — MORPHINE SULFATE 4 MG: 4 INJECTION, SOLUTION INTRAMUSCULAR; INTRAVENOUS at 10:10

## 2017-07-24 RX ADMIN — MIDAZOLAM 1 MG: 1 INJECTION INTRAMUSCULAR; INTRAVENOUS at 17:24

## 2017-07-24 RX ADMIN — DEXAMETHASONE SODIUM PHOSPHATE 4 MG: 10 INJECTION INTRAMUSCULAR; INTRAVENOUS at 18:08

## 2017-07-24 RX ADMIN — FENTANYL CITRATE 50 MCG: 50 INJECTION INTRAMUSCULAR; INTRAVENOUS at 18:56

## 2017-07-24 RX ADMIN — PROPOFOL 120 MG: 10 INJECTION, EMULSION INTRAVENOUS at 17:43

## 2017-07-24 RX ADMIN — SODIUM CHLORIDE, POTASSIUM CHLORIDE, SODIUM LACTATE AND CALCIUM CHLORIDE: 600; 310; 30; 20 INJECTION, SOLUTION INTRAVENOUS at 18:45

## 2017-07-24 RX ADMIN — MORPHINE SULFATE 2 MG: 2 INJECTION, SOLUTION INTRAMUSCULAR; INTRAVENOUS at 12:19

## 2017-07-24 RX ADMIN — SODIUM CHLORIDE, POTASSIUM CHLORIDE, SODIUM LACTATE AND CALCIUM CHLORIDE: 600; 310; 30; 20 INJECTION, SOLUTION INTRAVENOUS at 17:41

## 2017-07-24 NOTE — ANESTHESIA PREPROCEDURE EVALUATION
Anesthesia Evaluation     Patient summary reviewed and Nursing notes reviewed   NPO Solid Status: > 8 hours  NPO Liquid Status: > 8 hours     Airway   Dental - normal exam     Pulmonary - normal exam   (+) a smoker Current,   Cardiovascular - negative cardio ROS and normal exam        Neuro/Psych- negative ROS  GI/Hepatic/Renal/Endo    (+)  GERD,     Musculoskeletal     Abdominal    Substance History - negative use     OB/GYN          Other   (+) arthritis                                     Anesthesia Plan    ASA 3     general     intravenous induction   Anesthetic plan and risks discussed with patient.

## 2017-07-24 NOTE — ANESTHESIA PROCEDURE NOTES
Airway  Urgency: elective    Airway not difficult    General Information and Staff    Patient location during procedure: OR  Anesthesiologist: NOAH SMITH    Indications and Patient Condition  Indications for airway management: airway protection    Preoxygenated: yes  MILS maintained throughout  Mask difficulty assessment: 1 - vent by mask    Final Airway Details  Final airway type: supraglottic airway      Successful airway: classic  Size 4    Number of attempts at approach: 1

## 2017-07-24 NOTE — ANESTHESIA POSTPROCEDURE EVALUATION
Patient: Trice Melara    Procedure Summary     Date Anesthesia Start Anesthesia Stop Room / Location    07/24/17 2581 [id number]  [facility] / [facility]       Procedure Diagnosis Surgeon Provider    FEMUR INTRAMEDULLARY NAILING/RODDING (Right Thigh) No diagnosis on file. MD Jade Odell MD          Anesthesia Type: general  Last vitals  BP   149/86 (07/24/17 1935)    Temp   36.6 °C (97.9 °F) (07/24/17 1935)    Pulse   81 (07/24/17 1935)   Resp   16 (07/24/17 1935)    SpO2   100 % (07/24/17 1935)      Post Anesthesia Care and Evaluation    Patient location during evaluation: PACU  Anesthetic complications: No anesthetic complications

## 2017-07-25 LAB
ALBUMIN SERPL-MCNC: 3.3 G/DL (ref 3.5–5.2)
ALBUMIN/GLOB SERPL: 1.3 G/DL
ALP SERPL-CCNC: 53 U/L (ref 39–117)
ALT SERPL W P-5'-P-CCNC: 16 U/L (ref 1–33)
ANION GAP SERPL CALCULATED.3IONS-SCNC: 13 MMOL/L
AST SERPL-CCNC: 16 U/L (ref 1–32)
BASOPHILS # BLD AUTO: 0.01 10*3/MM3 (ref 0–0.2)
BASOPHILS NFR BLD AUTO: 0.1 % (ref 0–1.5)
BILIRUB SERPL-MCNC: 0.3 MG/DL (ref 0.1–1.2)
BUN BLD-MCNC: 13 MG/DL (ref 8–23)
BUN/CREAT SERPL: 24.1 (ref 7–25)
CALCIUM SPEC-SCNC: 8.3 MG/DL (ref 8.6–10.5)
CHLORIDE SERPL-SCNC: 101 MMOL/L (ref 98–107)
CO2 SERPL-SCNC: 25 MMOL/L (ref 22–29)
CREAT BLD-MCNC: 0.54 MG/DL (ref 0.57–1)
DEPRECATED RDW RBC AUTO: 45.8 FL (ref 37–54)
EOSINOPHIL # BLD AUTO: 0 10*3/MM3 (ref 0–0.7)
EOSINOPHIL NFR BLD AUTO: 0 % (ref 0.3–6.2)
ERYTHROCYTE [DISTWIDTH] IN BLOOD BY AUTOMATED COUNT: 13.7 % (ref 11.7–13)
GFR SERPL CREATININE-BSD FRML MDRD: 109 ML/MIN/1.73
GLOBULIN UR ELPH-MCNC: 2.5 GM/DL
GLUCOSE BLD-MCNC: 148 MG/DL (ref 65–99)
HCT VFR BLD AUTO: 34.8 % (ref 35.6–45.5)
HGB BLD-MCNC: 11 G/DL (ref 11.9–15.5)
IMM GRANULOCYTES # BLD: 0.08 10*3/MM3 (ref 0–0.03)
IMM GRANULOCYTES NFR BLD: 0.9 % (ref 0–0.5)
LYMPHOCYTES # BLD AUTO: 0.75 10*3/MM3 (ref 0.9–4.8)
LYMPHOCYTES NFR BLD AUTO: 8.4 % (ref 19.6–45.3)
MCH RBC QN AUTO: 28.9 PG (ref 26.9–32)
MCHC RBC AUTO-ENTMCNC: 31.6 G/DL (ref 32.4–36.3)
MCV RBC AUTO: 91.6 FL (ref 80.5–98.2)
MONOCYTES # BLD AUTO: 0.6 10*3/MM3 (ref 0.2–1.2)
MONOCYTES NFR BLD AUTO: 6.7 % (ref 5–12)
NEUTROPHILS # BLD AUTO: 7.54 10*3/MM3 (ref 1.9–8.1)
NEUTROPHILS NFR BLD AUTO: 83.9 % (ref 42.7–76)
NT-PROBNP SERPL-MCNC: 478.5 PG/ML (ref 0–1800)
PLATELET # BLD AUTO: 173 10*3/MM3 (ref 140–500)
PMV BLD AUTO: 10.2 FL (ref 6–12)
POTASSIUM BLD-SCNC: 3.9 MMOL/L (ref 3.5–5.2)
PROT SERPL-MCNC: 5.8 G/DL (ref 6–8.5)
RBC # BLD AUTO: 3.8 10*6/MM3 (ref 3.9–5.2)
SODIUM BLD-SCNC: 139 MMOL/L (ref 136–145)
TSH SERPL DL<=0.05 MIU/L-ACNC: 0.6 MIU/ML (ref 0.27–4.2)
WBC NRBC COR # BLD: 8.98 10*3/MM3 (ref 4.5–10.7)

## 2017-07-25 PROCEDURE — 94799 UNLISTED PULMONARY SVC/PX: CPT

## 2017-07-25 PROCEDURE — 85025 COMPLETE CBC W/AUTO DIFF WBC: CPT | Performed by: INTERNAL MEDICINE

## 2017-07-25 PROCEDURE — 63710000001 DIPHENHYDRAMINE PER 50 MG: Performed by: INTERNAL MEDICINE

## 2017-07-25 PROCEDURE — 25010000003 CEFAZOLIN IN DEXTROSE 2-4 GM/100ML-% SOLUTION: Performed by: ORTHOPAEDIC SURGERY

## 2017-07-25 PROCEDURE — 83880 ASSAY OF NATRIURETIC PEPTIDE: CPT | Performed by: INTERNAL MEDICINE

## 2017-07-25 PROCEDURE — 80053 COMPREHEN METABOLIC PANEL: CPT | Performed by: INTERNAL MEDICINE

## 2017-07-25 PROCEDURE — 97110 THERAPEUTIC EXERCISES: CPT

## 2017-07-25 PROCEDURE — 97161 PT EVAL LOW COMPLEX 20 MIN: CPT

## 2017-07-25 PROCEDURE — 97165 OT EVAL LOW COMPLEX 30 MIN: CPT | Performed by: OCCUPATIONAL THERAPIST

## 2017-07-25 PROCEDURE — 84443 ASSAY THYROID STIM HORMONE: CPT | Performed by: INTERNAL MEDICINE

## 2017-07-25 RX ADMIN — CEFAZOLIN SODIUM 2 G: 2 INJECTION, SOLUTION INTRAVENOUS at 09:12

## 2017-07-25 RX ADMIN — HYDROCODONE BITARTRATE AND ACETAMINOPHEN 1 TABLET: 5; 325 TABLET ORAL at 21:53

## 2017-07-25 RX ADMIN — DIPHENHYDRAMINE HYDROCHLORIDE 25 MG: 25 CAPSULE ORAL at 09:12

## 2017-07-25 RX ADMIN — PANTOPRAZOLE SODIUM 40 MG: 40 TABLET, DELAYED RELEASE ORAL at 06:51

## 2017-07-25 RX ADMIN — MUPIROCIN: 20 OINTMENT TOPICAL at 18:57

## 2017-07-25 RX ADMIN — HYDROCODONE BITARTRATE AND ACETAMINOPHEN 1 TABLET: 5; 325 TABLET ORAL at 06:47

## 2017-07-25 RX ADMIN — ASPIRIN 325 MG: 325 TABLET, DELAYED RELEASE ORAL at 09:11

## 2017-07-25 RX ADMIN — Medication 1250 MG: at 09:12

## 2017-07-25 RX ADMIN — HYDROCODONE BITARTRATE AND ACETAMINOPHEN 1 TABLET: 5; 325 TABLET ORAL at 14:50

## 2017-07-25 RX ADMIN — SODIUM CHLORIDE 75 ML/HR: 9 INJECTION, SOLUTION INTRAVENOUS at 00:03

## 2017-07-25 RX ADMIN — Medication 1 TABLET: at 21:53

## 2017-07-25 RX ADMIN — CEFAZOLIN SODIUM 2 G: 2 INJECTION, SOLUTION INTRAVENOUS at 00:07

## 2017-07-25 RX ADMIN — DIPHENHYDRAMINE HYDROCHLORIDE 25 MG: 25 CAPSULE ORAL at 18:57

## 2017-07-25 RX ADMIN — MUPIROCIN: 20 OINTMENT TOPICAL at 00:00

## 2017-07-26 LAB
HCT VFR BLD AUTO: 28.5 % (ref 35.6–45.5)
HGB BLD-MCNC: 8.9 G/DL (ref 11.9–15.5)

## 2017-07-26 PROCEDURE — 63710000001 DIPHENHYDRAMINE PER 50 MG: Performed by: INTERNAL MEDICINE

## 2017-07-26 PROCEDURE — 85018 HEMOGLOBIN: CPT | Performed by: ORTHOPAEDIC SURGERY

## 2017-07-26 PROCEDURE — 97110 THERAPEUTIC EXERCISES: CPT | Performed by: OCCUPATIONAL THERAPIST

## 2017-07-26 PROCEDURE — 85014 HEMATOCRIT: CPT | Performed by: ORTHOPAEDIC SURGERY

## 2017-07-26 PROCEDURE — 94799 UNLISTED PULMONARY SVC/PX: CPT

## 2017-07-26 PROCEDURE — 97110 THERAPEUTIC EXERCISES: CPT

## 2017-07-26 RX ADMIN — MUPIROCIN: 20 OINTMENT TOPICAL at 08:33

## 2017-07-26 RX ADMIN — PANTOPRAZOLE SODIUM 40 MG: 40 TABLET, DELAYED RELEASE ORAL at 06:13

## 2017-07-26 RX ADMIN — HYDROCODONE BITARTRATE AND ACETAMINOPHEN 1 TABLET: 5; 325 TABLET ORAL at 09:40

## 2017-07-26 RX ADMIN — DIPHENHYDRAMINE HYDROCHLORIDE 25 MG: 25 CAPSULE ORAL at 17:40

## 2017-07-26 RX ADMIN — HYDROCODONE BITARTRATE AND ACETAMINOPHEN 1 TABLET: 5; 325 TABLET ORAL at 16:22

## 2017-07-26 RX ADMIN — Medication 1250 MG: at 08:34

## 2017-07-26 RX ADMIN — HYDROCODONE BITARTRATE AND ACETAMINOPHEN 1 TABLET: 5; 325 TABLET ORAL at 03:36

## 2017-07-26 RX ADMIN — DIPHENHYDRAMINE HYDROCHLORIDE 25 MG: 25 CAPSULE ORAL at 08:33

## 2017-07-26 RX ADMIN — HYDROCODONE BITARTRATE AND ACETAMINOPHEN 1 TABLET: 5; 325 TABLET ORAL at 22:25

## 2017-07-26 RX ADMIN — ASPIRIN 325 MG: 325 TABLET, DELAYED RELEASE ORAL at 08:33

## 2017-07-27 ENCOUNTER — TELEPHONE (OUTPATIENT)
Dept: INTERNAL MEDICINE | Facility: CLINIC | Age: 77
End: 2017-07-27

## 2017-07-27 VITALS
HEART RATE: 92 BPM | WEIGHT: 129.2 LBS | SYSTOLIC BLOOD PRESSURE: 93 MMHG | HEIGHT: 62 IN | DIASTOLIC BLOOD PRESSURE: 52 MMHG | OXYGEN SATURATION: 96 % | RESPIRATION RATE: 16 BRPM | TEMPERATURE: 97.7 F | BODY MASS INDEX: 23.77 KG/M2

## 2017-07-27 LAB
ANION GAP SERPL CALCULATED.3IONS-SCNC: 13 MMOL/L
BASOPHILS # BLD AUTO: 0.04 10*3/MM3 (ref 0–0.2)
BASOPHILS NFR BLD AUTO: 0.6 % (ref 0–1.5)
BUN BLD-MCNC: 9 MG/DL (ref 8–23)
BUN/CREAT SERPL: 20.5 (ref 7–25)
CALCIUM SPEC-SCNC: 7.9 MG/DL (ref 8.6–10.5)
CHLORIDE SERPL-SCNC: 100 MMOL/L (ref 98–107)
CO2 SERPL-SCNC: 28 MMOL/L (ref 22–29)
CREAT BLD-MCNC: 0.44 MG/DL (ref 0.57–1)
DEPRECATED RDW RBC AUTO: 47.6 FL (ref 37–54)
EOSINOPHIL # BLD AUTO: 0.33 10*3/MM3 (ref 0–0.7)
EOSINOPHIL NFR BLD AUTO: 4.6 % (ref 0.3–6.2)
ERYTHROCYTE [DISTWIDTH] IN BLOOD BY AUTOMATED COUNT: 14 % (ref 11.7–13)
GFR SERPL CREATININE-BSD FRML MDRD: 138 ML/MIN/1.73
GLUCOSE BLD-MCNC: 96 MG/DL (ref 65–99)
HCT VFR BLD AUTO: 27.9 % (ref 35.6–45.5)
HGB BLD-MCNC: 8.6 G/DL (ref 11.9–15.5)
IMM GRANULOCYTES # BLD: 0.12 10*3/MM3 (ref 0–0.03)
IMM GRANULOCYTES NFR BLD: 1.7 % (ref 0–0.5)
LYMPHOCYTES # BLD AUTO: 1.52 10*3/MM3 (ref 0.9–4.8)
LYMPHOCYTES NFR BLD AUTO: 21.4 % (ref 19.6–45.3)
MCH RBC QN AUTO: 28.6 PG (ref 26.9–32)
MCHC RBC AUTO-ENTMCNC: 30.8 G/DL (ref 32.4–36.3)
MCV RBC AUTO: 92.7 FL (ref 80.5–98.2)
MONOCYTES # BLD AUTO: 0.76 10*3/MM3 (ref 0.2–1.2)
MONOCYTES NFR BLD AUTO: 10.7 % (ref 5–12)
NEUTROPHILS # BLD AUTO: 4.34 10*3/MM3 (ref 1.9–8.1)
NEUTROPHILS NFR BLD AUTO: 61 % (ref 42.7–76)
PLATELET # BLD AUTO: 152 10*3/MM3 (ref 140–500)
PMV BLD AUTO: 9.8 FL (ref 6–12)
POTASSIUM BLD-SCNC: 3.6 MMOL/L (ref 3.5–5.2)
RBC # BLD AUTO: 3.01 10*6/MM3 (ref 3.9–5.2)
SODIUM BLD-SCNC: 141 MMOL/L (ref 136–145)
WBC NRBC COR # BLD: 7.11 10*3/MM3 (ref 4.5–10.7)

## 2017-07-27 PROCEDURE — 80048 BASIC METABOLIC PNL TOTAL CA: CPT | Performed by: HOSPITALIST

## 2017-07-27 PROCEDURE — 85025 COMPLETE CBC W/AUTO DIFF WBC: CPT | Performed by: HOSPITALIST

## 2017-07-27 PROCEDURE — 63710000001 DIPHENHYDRAMINE PER 50 MG: Performed by: INTERNAL MEDICINE

## 2017-07-27 PROCEDURE — 94799 UNLISTED PULMONARY SVC/PX: CPT

## 2017-07-27 RX ORDER — HYDROCODONE BITARTRATE AND ACETAMINOPHEN 5; 325 MG/1; MG/1
1 TABLET ORAL EVERY 6 HOURS PRN
Qty: 15 TABLET | Refills: 0 | Status: SHIPPED | OUTPATIENT
Start: 2017-07-27 | End: 2017-09-11 | Stop reason: SDUPTHER

## 2017-07-27 RX ORDER — FAMOTIDINE 20 MG/1
20 TABLET, FILM COATED ORAL 2 TIMES DAILY
Qty: 60 TABLET | Refills: 0 | Status: SHIPPED | OUTPATIENT
Start: 2017-07-27 | End: 2017-08-24

## 2017-07-27 RX ADMIN — PANTOPRAZOLE SODIUM 40 MG: 40 TABLET, DELAYED RELEASE ORAL at 06:48

## 2017-07-27 RX ADMIN — HYDROCODONE BITARTRATE AND ACETAMINOPHEN 1 TABLET: 5; 325 TABLET ORAL at 06:49

## 2017-07-27 RX ADMIN — DIPHENHYDRAMINE HYDROCHLORIDE 25 MG: 25 CAPSULE ORAL at 08:45

## 2017-07-27 RX ADMIN — ASPIRIN 325 MG: 325 TABLET, DELAYED RELEASE ORAL at 08:44

## 2017-07-27 NOTE — TELEPHONE ENCOUNTER
We can hold off on the hematologist.  Not entirely sure why they have suddenly normalized.  She does need a DEXA scan to address her bone density.  Please schedule this in a few weeks with follow up appt.

## 2017-07-27 NOTE — TELEPHONE ENCOUNTER
Broke her hip on Monday, getting discharged today.     Pt stated that her white cells are not elevated anymore and wants to know if she still needs to go to hematology to have this evaluated.     Also wanted us to know that they are sending her home with pain medication.

## 2017-08-07 ENCOUNTER — APPOINTMENT (OUTPATIENT)
Dept: LAB | Facility: HOSPITAL | Age: 77
End: 2017-08-07

## 2017-08-07 ENCOUNTER — APPOINTMENT (OUTPATIENT)
Dept: ONCOLOGY | Facility: CLINIC | Age: 77
End: 2017-08-07

## 2017-08-11 ENCOUNTER — OFFICE VISIT (OUTPATIENT)
Dept: ORTHOPEDIC SURGERY | Facility: CLINIC | Age: 77
End: 2017-08-11

## 2017-08-11 VITALS — WEIGHT: 120 LBS | TEMPERATURE: 97.5 F | HEIGHT: 62 IN | BODY MASS INDEX: 22.08 KG/M2

## 2017-08-11 DIAGNOSIS — Z98.890 STATUS POST HIP SURGERY: Primary | ICD-10-CM

## 2017-08-11 PROCEDURE — 73560 X-RAY EXAM OF KNEE 1 OR 2: CPT | Performed by: ORTHOPAEDIC SURGERY

## 2017-08-11 PROCEDURE — 73502 X-RAY EXAM HIP UNI 2-3 VIEWS: CPT | Performed by: ORTHOPAEDIC SURGERY

## 2017-08-11 PROCEDURE — 99024 POSTOP FOLLOW-UP VISIT: CPT | Performed by: ORTHOPAEDIC SURGERY

## 2017-08-11 NOTE — PROGRESS NOTES
Trice Melara     : 1940     MRN: [id number]     DATE: 2017    CC:  2 weeks status post cephalomedullary fixation of femur fracture    Vitals:    17 1051   Temp: 97.5 °F (36.4 °C)       HPI: Patient returns to clinic today, now 2 weeks out from surgery.  Reports doing well. Pain is well-controlled.  Denies any new problems or concerns.    Physical exam: Incisions are well-approximated.  No erythema or drainage.  Motion is limited but appropriate.  Negative Homans.  Normal motor and sensory function distally.  Good pedal pulses with brisk cap refill.    Imaging  2 views of the right hip and knee (including AP and lateral) are ordered and reviewed for comparison purposes and to evaluate healing.  X-rays show alignment unchanged.     Impression:  2 weeks s/p cephalomedullary fixation of right femur fracture    Plan:    1.  Continue PT  2.  ECASA for DVT prophylaxis  3.  Follow up in 4 weeks with repeat 2v x-rays of hip and knee.    Huan Weiss MD

## 2017-08-24 ENCOUNTER — TELEPHONE (OUTPATIENT)
Dept: ORTHOPEDIC SURGERY | Facility: CLINIC | Age: 77
End: 2017-08-24

## 2017-08-24 ENCOUNTER — TELEPHONE (OUTPATIENT)
Dept: INTERNAL MEDICINE | Facility: CLINIC | Age: 77
End: 2017-08-24

## 2017-08-24 RX ORDER — OMEPRAZOLE 40 MG/1
40 CAPSULE, DELAYED RELEASE ORAL DAILY
Qty: 90 CAPSULE | Refills: 3 | Status: SHIPPED | OUTPATIENT
Start: 2017-08-24 | End: 2018-07-27 | Stop reason: SDUPTHER

## 2017-08-24 NOTE — TELEPHONE ENCOUNTER
When pt was in the hospital  told her to not take the omeprazole anymore and put her on famotidine, but now that she is out of the hospital, Teofilo stated it was fine to take the omeprazole again, but it is still discontinued out of her med list and she had thrown away the omeprazole that she had, because she thought she wasn't going to go back on it.     [facility]

## 2017-09-05 ENCOUNTER — TELEPHONE (OUTPATIENT)
Dept: ORTHOPEDIC SURGERY | Facility: CLINIC | Age: 77
End: 2017-09-05

## 2017-09-08 ENCOUNTER — TELEPHONE (OUTPATIENT)
Dept: INTERNAL MEDICINE | Facility: CLINIC | Age: 77
End: 2017-09-08

## 2017-09-08 ENCOUNTER — OFFICE VISIT (OUTPATIENT)
Dept: ORTHOPEDIC SURGERY | Facility: CLINIC | Age: 77
End: 2017-09-08

## 2017-09-08 VITALS — WEIGHT: 118 LBS | BODY MASS INDEX: 21.71 KG/M2 | HEIGHT: 62 IN | TEMPERATURE: 98 F

## 2017-09-08 DIAGNOSIS — Z98.890 STATUS POST HIP SURGERY: Primary | ICD-10-CM

## 2017-09-08 PROCEDURE — 73502 X-RAY EXAM HIP UNI 2-3 VIEWS: CPT | Performed by: ORTHOPAEDIC SURGERY

## 2017-09-08 PROCEDURE — 99024 POSTOP FOLLOW-UP VISIT: CPT | Performed by: ORTHOPAEDIC SURGERY

## 2017-09-08 PROCEDURE — 73560 X-RAY EXAM OF KNEE 1 OR 2: CPT | Performed by: ORTHOPAEDIC SURGERY

## 2017-09-11 RX ORDER — HYDROCODONE BITARTRATE AND ACETAMINOPHEN 5; 325 MG/1; MG/1
1 TABLET ORAL EVERY 6 HOURS PRN
Qty: 25 TABLET | Refills: 0 | Status: SHIPPED | OUTPATIENT
Start: 2017-09-11 | End: 2017-11-28 | Stop reason: SDUPTHER

## 2017-09-11 NOTE — TELEPHONE ENCOUNTER
Will refill today. She should follow up w/ dr. mosley before next year if she is going to need this medication chronically

## 2017-09-11 NOTE — PROGRESS NOTES
Trice Melara     : 1940     MRN: [id number]     DATE: 9/10/2017    CC:  6 weeks status post cephalo-medullary fixation of IT femur fracture    Vitals:    17 1050   Temp: 98 °F (36.7 °C)       HPI: Patient returns to clinic today, now 6 weeks out from surgery.  Reports doing well. Pain is well-controlled.  Ambulatory status is steadily improving.  Denies any new problems or concerns.    Physical exam:  Incisions are healed.  No erythema or drainage.  Motion is much better.  Negative Homans.  Normal motor and sensory function distally.  Good pedal pulses with brisk cap refill.      Imaging  2 views right hip and knee (including AP and lateral) are ordered and reviewed for comparison purposes and to evaluate healing.  X-rays show alignment unchanged.     Impression:  6 weeks s/p cephalo-medullary fixation of right IT femur fracture    Plan:    1.  Continue PT  2.  OK to discontinue DVT prophylaxis  3.  Follow up in 4 weeks with repeat 2v x-rays of hip and knee.

## 2017-09-13 ENCOUNTER — TELEPHONE (OUTPATIENT)
Dept: ORTHOPEDIC SURGERY | Facility: CLINIC | Age: 77
End: 2017-09-13

## 2017-09-26 ENCOUNTER — CLINICAL SUPPORT (OUTPATIENT)
Dept: ORTHOPEDIC SURGERY | Facility: CLINIC | Age: 77
End: 2017-09-26

## 2017-09-26 VITALS — BODY MASS INDEX: 21.16 KG/M2 | TEMPERATURE: 98.1 F | WEIGHT: 115 LBS | HEIGHT: 62 IN

## 2017-09-26 DIAGNOSIS — G89.29 CHRONIC PAIN OF BOTH KNEES: Primary | ICD-10-CM

## 2017-09-26 DIAGNOSIS — M25.561 CHRONIC PAIN OF BOTH KNEES: Primary | ICD-10-CM

## 2017-09-26 DIAGNOSIS — M25.562 CHRONIC PAIN OF BOTH KNEES: Primary | ICD-10-CM

## 2017-09-26 PROCEDURE — 20610 DRAIN/INJ JOINT/BURSA W/O US: CPT | Performed by: ORTHOPAEDIC SURGERY

## 2017-09-26 PROCEDURE — 73562 X-RAY EXAM OF KNEE 3: CPT | Performed by: ORTHOPAEDIC SURGERY

## 2017-09-26 RX ORDER — MELOXICAM 7.5 MG/1
7.5 TABLET ORAL DAILY
COMMUNITY
End: 2017-10-02 | Stop reason: SDUPTHER

## 2017-09-26 RX ADMIN — BUPIVACAINE HYDROCHLORIDE 4 ML: 5 INJECTION, SOLUTION EPIDURAL; INTRACAUDAL at 14:15

## 2017-09-26 RX ADMIN — BUPIVACAINE HYDROCHLORIDE 4 ML: 5 INJECTION, SOLUTION EPIDURAL; INTRACAUDAL at 14:14

## 2017-09-26 RX ADMIN — METHYLPREDNISOLONE ACETATE 80 MG: 80 INJECTION, SUSPENSION INTRA-ARTICULAR; INTRALESIONAL; INTRAMUSCULAR; SOFT TISSUE at 14:15

## 2017-09-26 RX ADMIN — METHYLPREDNISOLONE ACETATE 80 MG: 80 INJECTION, SUSPENSION INTRA-ARTICULAR; INTRALESIONAL; INTRAMUSCULAR; SOFT TISSUE at 14:14

## 2017-10-01 RX ORDER — METHYLPREDNISOLONE ACETATE 80 MG/ML
80 INJECTION, SUSPENSION INTRA-ARTICULAR; INTRALESIONAL; INTRAMUSCULAR; SOFT TISSUE
Status: COMPLETED | OUTPATIENT
Start: 2017-09-26 | End: 2017-09-26

## 2017-10-01 RX ORDER — BUPIVACAINE HYDROCHLORIDE 5 MG/ML
4 INJECTION, SOLUTION EPIDURAL; INTRACAUDAL
Status: COMPLETED | OUTPATIENT
Start: 2017-09-26 | End: 2017-09-26

## 2017-10-02 RX ORDER — MELOXICAM 7.5 MG/1
7.5 TABLET ORAL DAILY
Qty: 90 TABLET | Refills: 3 | Status: SHIPPED | OUTPATIENT
Start: 2017-10-02 | End: 2018-09-09 | Stop reason: SDUPTHER

## 2017-11-17 ENCOUNTER — TELEPHONE (OUTPATIENT)
Dept: ORTHOPEDIC SURGERY | Facility: CLINIC | Age: 77
End: 2017-11-17

## 2017-11-28 ENCOUNTER — TELEPHONE (OUTPATIENT)
Dept: INTERNAL MEDICINE | Facility: CLINIC | Age: 77
End: 2017-11-28

## 2017-11-28 RX ORDER — HYDROCODONE BITARTRATE AND ACETAMINOPHEN 5; 325 MG/1; MG/1
1 TABLET ORAL EVERY 6 HOURS PRN
Qty: 25 TABLET | Refills: 0 | Status: SHIPPED | OUTPATIENT
Start: 2017-11-28 | End: 2018-02-19 | Stop reason: SDUPTHER

## 2018-01-05 ENCOUNTER — OFFICE VISIT (OUTPATIENT)
Dept: ORTHOPEDIC SURGERY | Facility: CLINIC | Age: 78
End: 2018-01-05

## 2018-01-05 VITALS — BODY MASS INDEX: 21.16 KG/M2 | HEIGHT: 62 IN | WEIGHT: 115 LBS | TEMPERATURE: 98 F

## 2018-01-05 DIAGNOSIS — M17.0 BILATERAL PRIMARY OSTEOARTHRITIS OF KNEE: ICD-10-CM

## 2018-01-05 DIAGNOSIS — Z09 SURGERY FOLLOW-UP: Primary | ICD-10-CM

## 2018-01-05 PROCEDURE — 99212 OFFICE O/P EST SF 10 MIN: CPT | Performed by: ORTHOPAEDIC SURGERY

## 2018-01-05 PROCEDURE — 20610 DRAIN/INJ JOINT/BURSA W/O US: CPT | Performed by: ORTHOPAEDIC SURGERY

## 2018-01-05 PROCEDURE — 73502 X-RAY EXAM HIP UNI 2-3 VIEWS: CPT | Performed by: ORTHOPAEDIC SURGERY

## 2018-01-05 PROCEDURE — 73560 X-RAY EXAM OF KNEE 1 OR 2: CPT | Performed by: ORTHOPAEDIC SURGERY

## 2018-01-05 RX ADMIN — BUPIVACAINE HYDROCHLORIDE 2 ML: 5 INJECTION, SOLUTION PERINEURAL at 14:46

## 2018-01-05 RX ADMIN — METHYLPREDNISOLONE ACETATE 160 MG: 80 INJECTION, SUSPENSION INTRA-ARTICULAR; INTRALESIONAL; INTRAMUSCULAR; SOFT TISSUE at 14:46

## 2018-01-05 NOTE — PROGRESS NOTES
Chief Complaint:  Follow-up status post fixation of right hip fracture, new complaint of bilateral knee pain    HPI:  Mrs. Melara comes in today primarily for her knees.  She is roughly 6 months out from fixation of her right hip fracture.  She tells me the hip is doing great.  She has no pain in the hip whatsoever.  Her biggest complaint the knees.  She has been getting serial injections from Dr. Jacobson.  The injections have worked well for her in the past.    Exam:  The hip wounds are healed.  No tenderness about the hip.  Hip range of motion is well-tolerated.  Both knees demonstrate varus alignment.  She has moderate to severe medial joint line tenderness bilaterally as well as pseudo-laxity of the MCL's.  Gait is antalgic and she clearly has significant varus alignment bilaterally.    Imaging:  AP and lateral views of the right hip and knee are ordered and reviewed.  These are compared to previous x-rays.  I have also gone back and looked at her previous x-rays of the knees.  The hip fracture appears to have healed.  The fracture line remains barely visible but there does appear to be bridging callus on all cortices.  The knees demonstrate end stage medial compartment osteoarthritis with bone-on-bone degeneration, osteophyte formation and subchondral sclerosis.    Assessment:  1.  6 months status post cephalo-medullary fixation of right hip fracture  2.  Bilateral knee osteoarthritis     Plan:  The hip seems to be doing great.  She can follow-up as needed for that.  Discussed options for the knees.  The risks, benefits, and alternatives to repeat steroid injections were discussed.  She consented.  Going forward, she will follow up as needed.      Large Joint Arthrocentesis  Date/Time: 1/5/2018 2:46 PM  Consent given by: patient  Site marked: site marked  Timeout: Immediately prior to procedure a time out was called to verify the correct patient, procedure, equipment, support staff and site/side marked as  required   Supporting Documentation  Indications: pain and joint swelling   Procedure Details  Location: knee - R knee  Preparation: Patient was prepped and draped in the usual sterile fashion  Needle size: 25 G  Approach: anterior  Medications administered: 160 mg methylPREDNISolone acetate 80 MG/ML; 2 mL bupivacaine 0.5 %  Patient tolerance: patient tolerated the procedure well with no immediate complications    Large Joint Arthrocentesis  Date/Time: 1/5/2018 2:46 PM  Consent given by: patient  Site marked: site marked  Timeout: Immediately prior to procedure a time out was called to verify the correct patient, procedure, equipment, support staff and site/side marked as required   Supporting Documentation  Indications: pain and joint swelling   Procedure Details  Location: knee - L knee  Preparation: Patient was prepped and draped in the usual sterile fashion  Needle size: 25 G  Approach: anterolateral  Medications administered: 160 mg methylPREDNISolone acetate 80 MG/ML; 2 mL bupivacaine 0.5 %  Patient tolerance: patient tolerated the procedure well with no immediate complications            Huan Weiss MD  01/05/2018

## 2018-01-06 RX ORDER — METHYLPREDNISOLONE ACETATE 80 MG/ML
160 INJECTION, SUSPENSION INTRA-ARTICULAR; INTRALESIONAL; INTRAMUSCULAR; SOFT TISSUE
Status: COMPLETED | OUTPATIENT
Start: 2018-01-05 | End: 2018-01-05

## 2018-01-06 RX ORDER — BUPIVACAINE HYDROCHLORIDE 5 MG/ML
2 INJECTION, SOLUTION PERINEURAL
Status: COMPLETED | OUTPATIENT
Start: 2018-01-05 | End: 2018-01-05

## 2018-02-19 ENCOUNTER — TELEPHONE (OUTPATIENT)
Dept: INTERNAL MEDICINE | Facility: CLINIC | Age: 78
End: 2018-02-19

## 2018-02-19 RX ORDER — HYDROCODONE BITARTRATE AND ACETAMINOPHEN 5; 325 MG/1; MG/1
1 TABLET ORAL EVERY 6 HOURS PRN
Qty: 25 TABLET | Refills: 0 | Status: SHIPPED | OUTPATIENT
Start: 2018-02-19 | End: 2018-05-31 | Stop reason: SDUPTHER

## 2018-02-19 NOTE — TELEPHONE ENCOUNTER
Hydrocodone refill.   Loida CONNOR, expiring this month, will have her update at .   Rob needs review.

## 2018-04-09 ENCOUNTER — CLINICAL SUPPORT (OUTPATIENT)
Dept: ORTHOPEDIC SURGERY | Facility: CLINIC | Age: 78
End: 2018-04-09

## 2018-04-09 VITALS — TEMPERATURE: 97.9 F | HEIGHT: 62 IN | BODY MASS INDEX: 22.26 KG/M2 | WEIGHT: 121 LBS

## 2018-04-09 DIAGNOSIS — M17.0 BILATERAL PRIMARY OSTEOARTHRITIS OF KNEE: Primary | ICD-10-CM

## 2018-04-09 PROCEDURE — 20610 DRAIN/INJ JOINT/BURSA W/O US: CPT | Performed by: ORTHOPAEDIC SURGERY

## 2018-04-09 RX ORDER — LIDOCAINE HYDROCHLORIDE 20 MG/ML
4 INJECTION, SOLUTION EPIDURAL; INFILTRATION; INTRACAUDAL; PERINEURAL
Status: COMPLETED | OUTPATIENT
Start: 2018-04-09 | End: 2018-04-09

## 2018-04-09 RX ORDER — METHYLPREDNISOLONE ACETATE 80 MG/ML
80 INJECTION, SUSPENSION INTRA-ARTICULAR; INTRALESIONAL; INTRAMUSCULAR; SOFT TISSUE
Status: COMPLETED | OUTPATIENT
Start: 2018-04-09 | End: 2018-04-09

## 2018-04-09 RX ADMIN — METHYLPREDNISOLONE ACETATE 80 MG: 80 INJECTION, SUSPENSION INTRA-ARTICULAR; INTRALESIONAL; INTRAMUSCULAR; SOFT TISSUE at 15:19

## 2018-04-09 RX ADMIN — LIDOCAINE HYDROCHLORIDE 4 ML: 20 INJECTION, SOLUTION EPIDURAL; INFILTRATION; INTRACAUDAL; PERINEURAL at 15:19

## 2018-04-09 RX ADMIN — METHYLPREDNISOLONE ACETATE 80 MG: 80 INJECTION, SUSPENSION INTRA-ARTICULAR; INTRALESIONAL; INTRAMUSCULAR; SOFT TISSUE at 15:18

## 2018-04-09 RX ADMIN — LIDOCAINE HYDROCHLORIDE 4 ML: 20 INJECTION, SOLUTION EPIDURAL; INFILTRATION; INTRACAUDAL; PERINEURAL at 15:18

## 2018-04-09 NOTE — PROGRESS NOTES
"Bilateral Knee Joint Injection      Patient: Trice Melara        YOB: 1940            Chief Complaints:Bilateral Knee pain  Chief Complaint   Patient presents with   • Left Knee - Pain, Injections   • Right Knee - Pain, Injections         History of Present Illness: Pt gets intermittent  injections with good relief. Is here for repeat injection. Understands options.      Physical Exam: 77 y.o. female  General Appearance:    Alert, cooperative, in no acute distress                   Vitals:    04/09/18 1517   Temp: 97.9 °F (36.6 °C)   Weight: 54.9 kg (121 lb)   Height: 157.5 cm (62\")      Patient is alert and read ×3 no acute distress appears her above-listed at height weight and age.  Affect is normal respiratory rate is normal unlabored. Heart rate regular rate rhythm, sclera, dentition and hearing are normal for the purpose of this exam.  Exam and complaints are unchanged.      Procedure:  Large Joint Arthrocentesis  Date/Time: 4/9/2018 3:18 PM  Consent given by: patient  Site marked: site marked  Timeout: Immediately prior to procedure a time out was called to verify the correct patient, procedure, equipment, support staff and site/side marked as required   Supporting Documentation  Indications: pain   Procedure Details  Location: knee - R knee  Needle size: 22 G  Approach: anteromedial  Medications administered: 80 mg methylPREDNISolone acetate 80 MG/ML; 4 mL lidocaine PF 2% 2 %  Patient tolerance: patient tolerated the procedure well with no immediate complications    Large Joint Arthrocentesis  Date/Time: 4/9/2018 3:19 PM  Consent given by: patient  Site marked: site marked  Timeout: Immediately prior to procedure a time out was called to verify the correct patient, procedure, equipment, support staff and site/side marked as required   Supporting Documentation  Indications: pain   Procedure Details  Location: knee - L knee  Needle size: 22 G  Approach: anteromedial  Medications administered: 80 " mg methylPREDNISolone acetate 80 MG/ML; 4 mL lidocaine PF 2% 2 %  Patient tolerance: patient tolerated the procedure well with no immediate complications                Assessment. Persistent knee pain      Plan: Is to proceed with injection    The knee joint was injected under strict sterile technique with Marcaine and Depo-Medrol this was done sterilely and tolerated tolerated well.

## 2018-05-31 ENCOUNTER — TELEPHONE (OUTPATIENT)
Dept: INTERNAL MEDICINE | Facility: CLINIC | Age: 78
End: 2018-05-31

## 2018-05-31 RX ORDER — HYDROCODONE BITARTRATE AND ACETAMINOPHEN 5; 325 MG/1; MG/1
1 TABLET ORAL EVERY 6 HOURS PRN
Qty: 25 TABLET | Refills: 0 | Status: SHIPPED | OUTPATIENT
Start: 2018-05-31 | End: 2018-10-11 | Stop reason: SDUPTHER

## 2018-06-20 ENCOUNTER — TELEPHONE (OUTPATIENT)
Dept: ORTHOPEDIC SURGERY | Facility: CLINIC | Age: 78
End: 2018-06-20

## 2018-07-02 ENCOUNTER — CLINICAL SUPPORT (OUTPATIENT)
Dept: ORTHOPEDIC SURGERY | Facility: CLINIC | Age: 78
End: 2018-07-02

## 2018-07-02 VITALS — HEIGHT: 62 IN | TEMPERATURE: 98.2 F | WEIGHT: 129 LBS | BODY MASS INDEX: 23.74 KG/M2

## 2018-07-02 DIAGNOSIS — M17.0 BILATERAL PRIMARY OSTEOARTHRITIS OF KNEE: Primary | ICD-10-CM

## 2018-07-02 PROCEDURE — 20610 DRAIN/INJ JOINT/BURSA W/O US: CPT | Performed by: ORTHOPAEDIC SURGERY

## 2018-07-02 RX ORDER — LIDOCAINE HYDROCHLORIDE 10 MG/ML
4 INJECTION, SOLUTION EPIDURAL; INFILTRATION; INTRACAUDAL; PERINEURAL
Status: COMPLETED | OUTPATIENT
Start: 2018-07-02 | End: 2018-07-02

## 2018-07-02 RX ORDER — METHYLPREDNISOLONE ACETATE 80 MG/ML
80 INJECTION, SUSPENSION INTRA-ARTICULAR; INTRALESIONAL; INTRAMUSCULAR; SOFT TISSUE
Status: COMPLETED | OUTPATIENT
Start: 2018-07-02 | End: 2018-07-02

## 2018-07-02 RX ADMIN — LIDOCAINE HYDROCHLORIDE 4 ML: 10 INJECTION, SOLUTION EPIDURAL; INFILTRATION; INTRACAUDAL; PERINEURAL at 12:12

## 2018-07-02 RX ADMIN — METHYLPREDNISOLONE ACETATE 80 MG: 80 INJECTION, SUSPENSION INTRA-ARTICULAR; INTRALESIONAL; INTRAMUSCULAR; SOFT TISSUE at 12:12

## 2018-07-02 NOTE — PROGRESS NOTES
"Bilateral Knee Joint Injection      Patient: Trice Melara        YOB: 1940            Chief Complaints:bilateral Knee pain  Chief Complaint   Patient presents with   • Left Knee - Injections   • Right Knee - Injections         History of Present Illness: Pt gets intermittent  injections with good relief. Is here for repeat injection. Understands options.      Physical Exam: 77 y.o. female  General Appearance:    Alert, cooperative, in no acute distress                   Vitals:    07/02/18 1211   Temp: 98.2 °F (36.8 °C)   Weight: 58.5 kg (129 lb)   Height: 157.5 cm (62\")      Patient is alert and read ×3 no acute distress appears her above-listed at height weight and age.  Affect is normal respiratory rate is normal unlabored. Heart rate regular rate rhythm, sclera, dentition and hearing are normal for the purpose of this exam.  Exam and complaints are unchanged.      Procedure:  Large Joint Arthrocentesis  Date/Time: 7/2/2018 12:12 PM  Consent given by: patient  Site marked: site marked  Timeout: Immediately prior to procedure a time out was called to verify the correct patient, procedure, equipment, support staff and site/side marked as required   Supporting Documentation  Indications: pain   Procedure Details  Location: knee - R knee  Needle size: 22 G  Approach: anteromedial  Medications administered: 80 mg methylPREDNISolone acetate 80 MG/ML; 4 mL lidocaine PF 1% 1 %  Patient tolerance: patient tolerated the procedure well with no immediate complications    Large Joint Arthrocentesis  Date/Time: 7/2/2018 12:12 PM  Consent given by: patient  Site marked: site marked  Timeout: Immediately prior to procedure a time out was called to verify the correct patient, procedure, equipment, support staff and site/side marked as required   Supporting Documentation  Indications: pain   Procedure Details  Location: knee - L knee  Preparation: Patient was prepped and draped in the usual sterile fashion  Needle " size: 22 G  Approach: anteromedial  Medications administered: 80 mg methylPREDNISolone acetate 80 MG/ML; 4 mL lidocaine PF 1% 1 %  Patient tolerance: patient tolerated the procedure well with no immediate complications                Assessment. Persistent knee pain      Plan: Is to proceed with injection    The knee joint was injected under strict sterile technique with Marcaine and Depo-Medrol this was done sterilely and tolerated tolerated well.

## 2018-07-27 RX ORDER — OMEPRAZOLE 40 MG/1
CAPSULE, DELAYED RELEASE ORAL
Qty: 90 CAPSULE | Refills: 3 | Status: SHIPPED | OUTPATIENT
Start: 2018-07-27 | End: 2019-04-18

## 2018-09-10 RX ORDER — MELOXICAM 7.5 MG/1
TABLET ORAL
Qty: 30 TABLET | Refills: 0 | Status: SHIPPED | OUTPATIENT
Start: 2018-09-10 | End: 2018-10-11 | Stop reason: SDUPTHER

## 2018-10-09 ENCOUNTER — CLINICAL SUPPORT (OUTPATIENT)
Dept: ORTHOPEDIC SURGERY | Facility: CLINIC | Age: 78
End: 2018-10-09

## 2018-10-09 VITALS — TEMPERATURE: 98.1 F | HEIGHT: 62 IN | BODY MASS INDEX: 22.08 KG/M2 | WEIGHT: 120 LBS

## 2018-10-09 DIAGNOSIS — M17.0 PRIMARY OSTEOARTHRITIS OF KNEES, BILATERAL: Primary | ICD-10-CM

## 2018-10-09 PROCEDURE — 20610 DRAIN/INJ JOINT/BURSA W/O US: CPT | Performed by: ORTHOPAEDIC SURGERY

## 2018-10-09 RX ORDER — LIDOCAINE HYDROCHLORIDE 20 MG/ML
4 INJECTION, SOLUTION EPIDURAL; INFILTRATION; INTRACAUDAL; PERINEURAL
Status: COMPLETED | OUTPATIENT
Start: 2018-10-09 | End: 2018-10-09

## 2018-10-09 RX ORDER — METHYLPREDNISOLONE ACETATE 80 MG/ML
80 INJECTION, SUSPENSION INTRA-ARTICULAR; INTRALESIONAL; INTRAMUSCULAR; SOFT TISSUE
Status: COMPLETED | OUTPATIENT
Start: 2018-10-09 | End: 2018-10-09

## 2018-10-09 RX ADMIN — METHYLPREDNISOLONE ACETATE 80 MG: 80 INJECTION, SUSPENSION INTRA-ARTICULAR; INTRALESIONAL; INTRAMUSCULAR; SOFT TISSUE at 12:49

## 2018-10-09 RX ADMIN — LIDOCAINE HYDROCHLORIDE 4 ML: 20 INJECTION, SOLUTION EPIDURAL; INFILTRATION; INTRACAUDAL; PERINEURAL at 12:48

## 2018-10-09 RX ADMIN — METHYLPREDNISOLONE ACETATE 80 MG: 80 INJECTION, SUSPENSION INTRA-ARTICULAR; INTRALESIONAL; INTRAMUSCULAR; SOFT TISSUE at 12:48

## 2018-10-09 RX ADMIN — LIDOCAINE HYDROCHLORIDE 4 ML: 20 INJECTION, SOLUTION EPIDURAL; INFILTRATION; INTRACAUDAL; PERINEURAL at 12:49

## 2018-10-09 NOTE — PROGRESS NOTES
"Bilateral Knee Joint Injection      Patient: Trice Melara        YOB: 1940            Chief Complaints: bilateral Knee pain  Chief Complaint   Patient presents with   • Right Knee - Follow-up, Pain   • Left Knee - Follow-up, Pain           History of Present Illness: Pt gets intermittent  injections with good relief. Is here for repeat injection. Understands options.      Physical Exam: 78 y.o. female  General Appearance:    Alert, cooperative, in no acute distress                   Vitals:    10/09/18 1246   Temp: 98.1 °F (36.7 °C)   TempSrc: Temporal Artery    Weight: 54.4 kg (120 lb)   Height: 157.5 cm (62\")      Patient is alert and read ×3 no acute distress appears her above-listed at height weight and age.  Affect is normal respiratory rate is normal unlabored. Heart rate regular rate rhythm, sclera, dentition and hearing are normal for the purpose of this exam.  Exam and complaints are unchanged.      Procedure:  Large Joint Arthrocentesis  Date/Time: 10/9/2018 12:48 PM  Consent given by: patient  Site marked: site marked  Timeout: Immediately prior to procedure a time out was called to verify the correct patient, procedure, equipment, support staff and site/side marked as required   Supporting Documentation  Indications: pain   Procedure Details  Location: knee - R knee  Needle size: 25 G  Approach: anteromedial  Medications administered: 80 mg methylPREDNISolone acetate 80 MG/ML; 4 mL lidocaine PF 2% 2 %  Patient tolerance: patient tolerated the procedure well with no immediate complications    Large Joint Arthrocentesis  Date/Time: 10/9/2018 12:49 PM  Consent given by: patient  Site marked: site marked  Timeout: Immediately prior to procedure a time out was called to verify the correct patient, procedure, equipment, support staff and site/side marked as required   Supporting Documentation  Indications: pain   Procedure Details  Location: knee - L knee  Needle size: 25 G  Approach: " anteromedial  Medications administered: 80 mg methylPREDNISolone acetate 80 MG/ML; 4 mL lidocaine PF 2% 2 %  Patient tolerance: patient tolerated the procedure well with no immediate complications                Assessment. Persistent knee pain      Plan: Is to proceed with injection    The knee joint was injected under strict sterile technique with Marcaine and Depo-Medrol this was done sterilely and tolerated tolerated well.

## 2018-10-11 ENCOUNTER — OFFICE VISIT (OUTPATIENT)
Dept: INTERNAL MEDICINE | Facility: CLINIC | Age: 78
End: 2018-10-11

## 2018-10-11 VITALS
HEART RATE: 102 BPM | RESPIRATION RATE: 18 BRPM | WEIGHT: 135 LBS | SYSTOLIC BLOOD PRESSURE: 132 MMHG | DIASTOLIC BLOOD PRESSURE: 74 MMHG | HEIGHT: 62 IN | BODY MASS INDEX: 24.84 KG/M2 | OXYGEN SATURATION: 98 %

## 2018-10-11 DIAGNOSIS — Z23 NEED FOR INFLUENZA VACCINATION: ICD-10-CM

## 2018-10-11 DIAGNOSIS — Z51.81 ENCOUNTER FOR MONITORING CHRONIC NSAID THERAPY: ICD-10-CM

## 2018-10-11 DIAGNOSIS — Z79.1 ENCOUNTER FOR MONITORING CHRONIC NSAID THERAPY: ICD-10-CM

## 2018-10-11 DIAGNOSIS — Z12.31 ENCOUNTER FOR SCREENING MAMMOGRAM FOR BREAST CANCER: ICD-10-CM

## 2018-10-11 DIAGNOSIS — M17.0 PRIMARY OSTEOARTHRITIS OF KNEES, BILATERAL: ICD-10-CM

## 2018-10-11 DIAGNOSIS — D50.8 OTHER IRON DEFICIENCY ANEMIA: Primary | ICD-10-CM

## 2018-10-11 LAB
ALBUMIN SERPL-MCNC: 4.2 G/DL (ref 3.5–5.2)
ALBUMIN/GLOB SERPL: 1.4 G/DL
ALP SERPL-CCNC: 104 U/L (ref 39–117)
ALT SERPL-CCNC: 12 U/L (ref 1–33)
AST SERPL-CCNC: 16 U/L (ref 1–32)
BASOPHILS # BLD AUTO: 0.08 10*3/MM3 (ref 0–0.2)
BASOPHILS NFR BLD AUTO: 0.8 % (ref 0–1.5)
BILIRUB SERPL-MCNC: 0.3 MG/DL (ref 0.1–1.2)
BUN SERPL-MCNC: 15 MG/DL (ref 8–23)
BUN/CREAT SERPL: 19.5 (ref 7–25)
CALCIUM SERPL-MCNC: 9.5 MG/DL (ref 8.6–10.5)
CHLORIDE SERPL-SCNC: 103 MMOL/L (ref 98–107)
CO2 SERPL-SCNC: 27.5 MMOL/L (ref 22–29)
CREAT SERPL-MCNC: 0.77 MG/DL (ref 0.57–1)
EOSINOPHIL # BLD AUTO: 0.46 10*3/MM3 (ref 0–0.7)
EOSINOPHIL NFR BLD AUTO: 4.8 % (ref 0.3–6.2)
ERYTHROCYTE [DISTWIDTH] IN BLOOD BY AUTOMATED COUNT: 14.2 % (ref 11.7–13)
GLOBULIN SER CALC-MCNC: 3 GM/DL
GLUCOSE SERPL-MCNC: 86 MG/DL (ref 65–99)
HCT VFR BLD AUTO: 43.9 % (ref 35.6–45.5)
HGB BLD-MCNC: 13.7 G/DL (ref 11.9–15.5)
IMM GRANULOCYTES # BLD: 0.05 10*3/MM3 (ref 0–0.03)
IMM GRANULOCYTES NFR BLD: 0.5 % (ref 0–0.5)
LYMPHOCYTES # BLD AUTO: 2.47 10*3/MM3 (ref 0.9–4.8)
LYMPHOCYTES NFR BLD AUTO: 25.6 % (ref 19.6–45.3)
MCH RBC QN AUTO: 27.3 PG (ref 26.9–32)
MCHC RBC AUTO-ENTMCNC: 31.2 G/DL (ref 32.4–36.3)
MCV RBC AUTO: 87.6 FL (ref 80.5–98.2)
MONOCYTES # BLD AUTO: 0.8 10*3/MM3 (ref 0.2–1.2)
MONOCYTES NFR BLD AUTO: 8.3 % (ref 5–12)
NEUTROPHILS # BLD AUTO: 5.83 10*3/MM3 (ref 1.9–8.1)
NEUTROPHILS NFR BLD AUTO: 60.5 % (ref 42.7–76)
PLATELET # BLD AUTO: 238 10*3/MM3 (ref 140–500)
POTASSIUM SERPL-SCNC: 3.7 MMOL/L (ref 3.5–5.2)
PROT SERPL-MCNC: 7.2 G/DL (ref 6–8.5)
RBC # BLD AUTO: 5.01 10*6/MM3 (ref 3.9–5.2)
SODIUM SERPL-SCNC: 142 MMOL/L (ref 136–145)
WBC # BLD AUTO: 9.64 10*3/MM3 (ref 4.5–10.7)

## 2018-10-11 PROCEDURE — G0008 ADMIN INFLUENZA VIRUS VAC: HCPCS | Performed by: INTERNAL MEDICINE

## 2018-10-11 PROCEDURE — 90662 IIV NO PRSV INCREASED AG IM: CPT | Performed by: INTERNAL MEDICINE

## 2018-10-11 PROCEDURE — 99214 OFFICE O/P EST MOD 30 MIN: CPT | Performed by: INTERNAL MEDICINE

## 2018-10-11 RX ORDER — HYDROCODONE BITARTRATE AND ACETAMINOPHEN 5; 325 MG/1; MG/1
1 TABLET ORAL EVERY 6 HOURS PRN
Qty: 25 TABLET | Refills: 0 | Status: SHIPPED | OUTPATIENT
Start: 2018-10-11 | End: 2019-03-26 | Stop reason: SDUPTHER

## 2018-10-11 RX ORDER — MELOXICAM 7.5 MG/1
7.5 TABLET ORAL DAILY
Qty: 90 TABLET | Refills: 1 | Status: SHIPPED | OUTPATIENT
Start: 2018-10-11 | End: 2019-04-09 | Stop reason: SDUPTHER

## 2018-10-11 NOTE — PROGRESS NOTES
Chief Complaint  Trice Melara is a 78 y.o. female who presents for Med Refill and Follow-up (Pt has not been seen in over a year, do not see an actual physical in [facility])  .    History of Present Illness   Trice is here for routine follow up.  She has not been seen in over a year.  She tends to call her son Macho Melara when she needs something acute.  She broke her femur a year ago in July and has recovered from this.   She uses the hydrocodone occasionally maybe once a week.  She uses this for her pain when she is going to be out.  She is still smoking.  She has no intention to quit.  We discussed LDCT screening.  She does not want this.  She says she would not do anything of a lesion were to be found.  Her hgb was low on the last labs in the computer.  She has not had this rechecked since then.  She denies any cp or palp or dizziness or soa.  occ edema.       Review of Systems   Constitution: Negative for malaise/fatigue.   Cardiovascular: Negative for chest pain, dyspnea on exertion and palpitations.   Respiratory: Negative for cough and shortness of breath.    Musculoskeletal: Positive for arthritis and joint pain.   Neurological: Negative for dizziness and light-headedness.       Patient Active Problem List   Diagnosis   • Gastroesophageal reflux disease   • Knee pain   • Iron deficiency anemia   • Abnormal kidney function   • Acute renal failure (CMS/HCC)   • Arthritis of knee   • Arthritis   • Osteoarthrosis, localized, primary, knee   • Primary osteoarthritis of knees, bilateral   • Closed displaced intertrochanteric fracture of right femur (CMS/HCC)   • Fall   • Osteoporosis       Past Medical History:   Diagnosis Date   • Abdominal wall dehiscence 01/08/2016    Fascial Dehiscence   • Acute renal failure (CMS/HCC)    • Aspiration pneumonia (CMS/HCC)    • Colovaginal fistula 2004   • Diverticulitis 2010    with paracolonic abscess and colovesical fistula, 2004   • Gastric ulcer    • GERD (gastroesophageal  reflux disease)    • H/O Bowel perforation 2004   • History of diverticular abscess    • History of GI bleed 2010   • Incisional hernia 01/08/2016   • Osteoarthritis    • Peritonitis (CMS/HCC) 01/2016   • Septic shock (CMS/HCC)    • Small bowel anastomotic leak 01/08/2016       Past Surgical History:   Procedure Laterality Date   • BREAST CYST EXCISION     • COLON RESECTION  01/08/2016    Smal Bowel Resection x2-Dr. Syd Maharaj   • COLON SURGERY Left 2010    with closure of fistula   • EXPLORATORY LAPAROTOMY  12/31/2015    With Extensive Adhesiolysis of over 1 hour to allow conduct of the operation and repair of small intestine-Dr. Syd Maharaj   • EXPLORATORY LAPAROTOMY  01/08/2016    Dr. Syd Maharaj   • INCISIONAL HERNIA REPAIR  01/08/2016    Incisional Hernia Repair with Fascial Dehiscence Repair with Mesh and Bilateral Component Separation-Dr. Syd Maharaj   • MS OPEN FIX INTER/SUBTROCH FX,IMPLNT Right 7/24/2017    Procedure: FEMUR INTRAMEDULLARY NAILING/RODDING;  Surgeon: Huan Weiss MD;  Location: [facility];  Service: Orthopedics   • TOTAL ABDOMINAL HYSTERECTOMY  1970s       Family History   Problem Relation Age of Onset   • Dementia Mother    • Leukemia Father        Social History     Social History   • Marital status:      Spouse name: N/A   • Number of children: N/A   • Years of education: N/A     Occupational History   •       Part-time billing for a psychotherapist     Social History Main Topics   • Smoking status: Current Some Day Smoker     Packs/day: 0.50     Years: 50.00     Types: Cigarettes   • Smokeless tobacco: Never Used   • Alcohol use Yes      Comment: 1-2 per evening   • Drug use: No   • Sexual activity: Defer     Other Topics Concern   • Not on file     Social History Narrative   • No narrative on file       Current Outpatient Prescriptions on File Prior to Visit   Medication Sig Dispense Refill   • acetaminophen (TYLENOL) 500 MG tablet Take 500 mg by mouth 2  "(Two) Times a Day.     • Calcium Carbonate-Vitamin D (CALCIUM-VITAMIN D) 500-200 MG-UNIT per tablet Take 1 tablet by mouth 2 (Two) Times a Day With Meals.     • diphenhydrAMINE (BENADRYL) 25 mg capsule Take 25 mg by mouth 2 (Two) Times a Day.     • diphenoxylate-atropine (LOMOTIL) 2.5-0.025 MG per tablet Take 1 tablet by mouth 4 (Four) Times a Day As Needed.     • meloxicam (MOBIC) 7.5 MG tablet TAKE 1 TABLET DAILY 30 tablet 0   • Multiple Vitamins-Minerals (MULTIVITAL) tablet Take 1 tablet by mouth Daily.     • omeprazole (priLOSEC) 40 MG capsule TAKE 1 CAPSULE DAILY 90 capsule 3   • [DISCONTINUED] HYDROcodone-acetaminophen (NORCO) 5-325 MG per tablet Take 1 tablet by mouth Every 6 (Six) Hours As Needed for Moderate Pain . 25 tablet 0     No current facility-administered medications on file prior to visit.        No Known Allergies    Vitals:    10/11/18 1347   BP: 132/74   Pulse: 102   Resp: 18   SpO2: 98%   Weight: 61.2 kg (135 lb)   Height: 157.5 cm (62.01\")       Body mass index is 24.69 kg/m².    Objective   Physical Exam   Constitutional: She is oriented to person, place, and time. She appears well-developed and well-nourished. No distress.   HENT:   Head: Normocephalic and atraumatic.   Eyes: Conjunctivae are normal. No scleral icterus.   Neck: Normal range of motion. Neck supple.   Cardiovascular: Normal rate, regular rhythm and normal heart sounds.  Exam reveals no gallop and no friction rub.    No murmur heard.  Pulmonary/Chest: Effort normal and breath sounds normal. No respiratory distress. She has no wheezes. She has no rales.   Musculoskeletal: She exhibits no edema.   Lymphadenopathy:     She has no cervical adenopathy.   Neurological: She is alert and oriented to person, place, and time. No cranial nerve deficit.   Psychiatric: She has a normal mood and affect. Her behavior is normal. Judgment and thought content normal.       Results for orders placed or performed during the hospital encounter of " 07/24/17   Comprehensive Metabolic Panel   Result Value Ref Range    Glucose 106 (H) 65 - 99 mg/dL    BUN 15 8 - 23 mg/dL    Creatinine 0.71 0.57 - 1.00 mg/dL    Sodium 142 136 - 145 mmol/L    Potassium 3.7 3.5 - 5.2 mmol/L    Chloride 101 98 - 107 mmol/L    CO2 30.1 (H) 22.0 - 29.0 mmol/L    Calcium 9.6 8.6 - 10.5 mg/dL    Total Protein 7.1 6.0 - 8.5 g/dL    Albumin 4.00 3.50 - 5.20 g/dL    ALT (SGPT) 19 1 - 33 U/L    AST (SGOT) 18 1 - 32 U/L    Alkaline Phosphatase 62 39 - 117 U/L    Total Bilirubin 0.4 0.1 - 1.2 mg/dL    eGFR Non African Amer 79 >60 mL/min/1.73    Globulin 3.1 gm/dL    A/G Ratio 1.3 g/dL    BUN/Creatinine Ratio 21.1 7.0 - 25.0    Anion Gap 10.9 mmol/L   Protime-INR   Result Value Ref Range    Protime 12.6 11.7 - 14.2 Seconds    INR 0.98 0.90 - 1.10   CBC Auto Differential   Result Value Ref Range    WBC 7.86 4.50 - 10.70 10*3/mm3    RBC 4.89 3.90 - 5.20 10*6/mm3    Hemoglobin 13.9 11.9 - 15.5 g/dL    Hematocrit 44.4 35.6 - 45.5 %    MCV 90.8 80.5 - 98.2 fL    MCH 28.4 26.9 - 32.0 pg    MCHC 31.3 (L) 32.4 - 36.3 g/dL    RDW 13.7 (H) 11.7 - 13.0 %    RDW-SD 45.8 37.0 - 54.0 fl    MPV 10.1 6.0 - 12.0 fL    Platelets 173 140 - 500 10*3/mm3    Neutrophil % 67.8 42.7 - 76.0 %    Lymphocyte % 18.7 (L) 19.6 - 45.3 %    Monocyte % 8.9 5.0 - 12.0 %    Eosinophil % 3.2 0.3 - 6.2 %    Basophil % 0.5 0.0 - 1.5 %    Immature Grans % 0.9 (H) 0.0 - 0.5 %    Neutrophils, Absolute 5.33 1.90 - 8.10 10*3/mm3    Lymphocytes, Absolute 1.47 0.90 - 4.80 10*3/mm3    Monocytes, Absolute 0.70 0.20 - 1.20 10*3/mm3    Eosinophils, Absolute 0.25 0.00 - 0.70 10*3/mm3    Basophils, Absolute 0.04 0.00 - 0.20 10*3/mm3    Immature Grans, Absolute 0.07 (H) 0.00 - 0.03 10*3/mm3   BNP   Result Value Ref Range    proBNP 478.5 0.0 - 1,800.0 pg/mL   Comprehensive Metabolic Panel   Result Value Ref Range    Glucose 148 (H) 65 - 99 mg/dL    BUN 13 8 - 23 mg/dL    Creatinine 0.54 (L) 0.57 - 1.00 mg/dL    Sodium 139 136 - 145 mmol/L     Potassium 3.9 3.5 - 5.2 mmol/L    Chloride 101 98 - 107 mmol/L    CO2 25.0 22.0 - 29.0 mmol/L    Calcium 8.3 (L) 8.6 - 10.5 mg/dL    Total Protein 5.8 (L) 6.0 - 8.5 g/dL    Albumin 3.30 (L) 3.50 - 5.20 g/dL    ALT (SGPT) 16 1 - 33 U/L    AST (SGOT) 16 1 - 32 U/L    Alkaline Phosphatase 53 39 - 117 U/L    Total Bilirubin 0.3 0.1 - 1.2 mg/dL    eGFR Non African Amer 109 >60 mL/min/1.73    Globulin 2.5 gm/dL    A/G Ratio 1.3 g/dL    BUN/Creatinine Ratio 24.1 7.0 - 25.0    Anion Gap 13.0 mmol/L   CBC Auto Differential   Result Value Ref Range    WBC 8.98 4.50 - 10.70 10*3/mm3    RBC 3.80 (L) 3.90 - 5.20 10*6/mm3    Hemoglobin 11.0 (L) 11.9 - 15.5 g/dL    Hematocrit 34.8 (L) 35.6 - 45.5 %    MCV 91.6 80.5 - 98.2 fL    MCH 28.9 26.9 - 32.0 pg    MCHC 31.6 (L) 32.4 - 36.3 g/dL    RDW 13.7 (H) 11.7 - 13.0 %    RDW-SD 45.8 37.0 - 54.0 fl    MPV 10.2 6.0 - 12.0 fL    Platelets 173 140 - 500 10*3/mm3    Neutrophil % 83.9 (H) 42.7 - 76.0 %    Lymphocyte % 8.4 (L) 19.6 - 45.3 %    Monocyte % 6.7 5.0 - 12.0 %    Eosinophil % 0.0 (L) 0.3 - 6.2 %    Basophil % 0.1 0.0 - 1.5 %    Immature Grans % 0.9 (H) 0.0 - 0.5 %    Neutrophils, Absolute 7.54 1.90 - 8.10 10*3/mm3    Lymphocytes, Absolute 0.75 (L) 0.90 - 4.80 10*3/mm3    Monocytes, Absolute 0.60 0.20 - 1.20 10*3/mm3    Eosinophils, Absolute 0.00 0.00 - 0.70 10*3/mm3    Basophils, Absolute 0.01 0.00 - 0.20 10*3/mm3    Immature Grans, Absolute 0.08 (H) 0.00 - 0.03 10*3/mm3   TSH   Result Value Ref Range    TSH 0.605 0.270 - 4.200 mIU/mL   Hemoglobin & Hematocrit, Blood   Result Value Ref Range    Hemoglobin 8.9 (L) 11.9 - 15.5 g/dL    Hematocrit 28.5 (L) 35.6 - 45.5 %   Basic Metabolic Panel   Result Value Ref Range    Glucose 96 65 - 99 mg/dL    BUN 9 8 - 23 mg/dL    Creatinine 0.44 (L) 0.57 - 1.00 mg/dL    Sodium 141 136 - 145 mmol/L    Potassium 3.6 3.5 - 5.2 mmol/L    Chloride 100 98 - 107 mmol/L    CO2 28.0 22.0 - 29.0 mmol/L    Calcium 7.9 (L) 8.6 - 10.5 mg/dL    eGFR Non  African Amer 138 >60 mL/min/1.73    BUN/Creatinine Ratio 20.5 7.0 - 25.0    Anion Gap 13.0 mmol/L   CBC Auto Differential   Result Value Ref Range    WBC 7.11 4.50 - 10.70 10*3/mm3    RBC 3.01 (L) 3.90 - 5.20 10*6/mm3    Hemoglobin 8.6 (L) 11.9 - 15.5 g/dL    Hematocrit 27.9 (L) 35.6 - 45.5 %    MCV 92.7 80.5 - 98.2 fL    MCH 28.6 26.9 - 32.0 pg    MCHC 30.8 (L) 32.4 - 36.3 g/dL    RDW 14.0 (H) 11.7 - 13.0 %    RDW-SD 47.6 37.0 - 54.0 fl    MPV 9.8 6.0 - 12.0 fL    Platelets 152 140 - 500 10*3/mm3    Neutrophil % 61.0 42.7 - 76.0 %    Lymphocyte % 21.4 19.6 - 45.3 %    Monocyte % 10.7 5.0 - 12.0 %    Eosinophil % 4.6 0.3 - 6.2 %    Basophil % 0.6 0.0 - 1.5 %    Immature Grans % 1.7 (H) 0.0 - 0.5 %    Neutrophils, Absolute 4.34 1.90 - 8.10 10*3/mm3    Lymphocytes, Absolute 1.52 0.90 - 4.80 10*3/mm3    Monocytes, Absolute 0.76 0.20 - 1.20 10*3/mm3    Eosinophils, Absolute 0.33 0.00 - 0.70 10*3/mm3    Basophils, Absolute 0.04 0.00 - 0.20 10*3/mm3    Immature Grans, Absolute 0.12 (H) 0.00 - 0.03 10*3/mm3       Assessment/Plan   Diagnoses and all orders for this visit:    Other iron deficiency anemia  -     Comprehensive Metabolic Panel  -     CBC & Differential    Primary osteoarthritis of knees, bilateral  -     Comprehensive Metabolic Panel  -     HYDROcodone-acetaminophen (NORCO) 5-325 MG per tablet; Take 1 tablet by mouth Every 6 (Six) Hours As Needed for Moderate Pain .    Encounter for monitoring chronic NSAID therapy  -     Comprehensive Metabolic Panel  -     CBC & Differential    Encounter for screening mammogram for breast cancer  -     Mammo Screening Bilateral With CAD; Future    Need for influenza vaccination  -     Fluzone High Dose =>65Years        Discussion/Summary  Trice is here for routine follow up.  She is due for mammo.  Referral placed.  Flu shot today.  She is due for pneumovax but will get this in a month at her local pharmacy.  Ok to continue prn hydrocodone for her joint pain.  Controlled Sub  agreement signed today.  I have encouraged her to quit smoking.  She is really not interested at all.  She declines LDCT screening.  Advised to continue to stay active.  Continue current meds.  Will check labs today.    Return in about 1 year (around 10/11/2019) for Annual physical, with fasting labs prior.

## 2018-10-30 ENCOUNTER — HOSPITAL ENCOUNTER (OUTPATIENT)
Dept: MAMMOGRAPHY | Facility: HOSPITAL | Age: 78
Discharge: HOME | End: 2018-10-30
Admitting: INTERNAL MEDICINE

## 2018-10-30 DIAGNOSIS — Z12.31 ENCOUNTER FOR SCREENING MAMMOGRAM FOR BREAST CANCER: ICD-10-CM

## 2018-10-30 PROCEDURE — 77067 SCR MAMMO BI INCL CAD: CPT

## 2019-01-22 ENCOUNTER — CLINICAL SUPPORT (OUTPATIENT)
Dept: ORTHOPEDIC SURGERY | Facility: CLINIC | Age: 79
End: 2019-01-22

## 2019-01-22 VITALS — WEIGHT: 135 LBS | BODY MASS INDEX: 24.84 KG/M2 | TEMPERATURE: 97.8 F | HEIGHT: 62 IN

## 2019-01-22 DIAGNOSIS — M17.0 PRIMARY OSTEOARTHRITIS OF KNEES, BILATERAL: Primary | ICD-10-CM

## 2019-01-22 PROCEDURE — 73562 X-RAY EXAM OF KNEE 3: CPT | Performed by: ORTHOPAEDIC SURGERY

## 2019-01-22 PROCEDURE — 20610 DRAIN/INJ JOINT/BURSA W/O US: CPT | Performed by: ORTHOPAEDIC SURGERY

## 2019-01-22 RX ORDER — LIDOCAINE HYDROCHLORIDE 20 MG/ML
4 INJECTION, SOLUTION EPIDURAL; INFILTRATION; INTRACAUDAL; PERINEURAL
Status: COMPLETED | OUTPATIENT
Start: 2019-01-22 | End: 2019-01-22

## 2019-01-22 RX ORDER — METHYLPREDNISOLONE ACETATE 80 MG/ML
80 INJECTION, SUSPENSION INTRA-ARTICULAR; INTRALESIONAL; INTRAMUSCULAR; SOFT TISSUE
Status: COMPLETED | OUTPATIENT
Start: 2019-01-22 | End: 2019-01-22

## 2019-01-22 RX ADMIN — METHYLPREDNISOLONE ACETATE 80 MG: 80 INJECTION, SUSPENSION INTRA-ARTICULAR; INTRALESIONAL; INTRAMUSCULAR; SOFT TISSUE at 12:19

## 2019-01-22 RX ADMIN — METHYLPREDNISOLONE ACETATE 80 MG: 80 INJECTION, SUSPENSION INTRA-ARTICULAR; INTRALESIONAL; INTRAMUSCULAR; SOFT TISSUE at 12:18

## 2019-01-22 RX ADMIN — LIDOCAINE HYDROCHLORIDE 4 ML: 20 INJECTION, SOLUTION EPIDURAL; INFILTRATION; INTRACAUDAL; PERINEURAL at 12:19

## 2019-01-22 RX ADMIN — LIDOCAINE HYDROCHLORIDE 4 ML: 20 INJECTION, SOLUTION EPIDURAL; INFILTRATION; INTRACAUDAL; PERINEURAL at 12:18

## 2019-01-22 NOTE — PROGRESS NOTES
Bilateral Knee Joint Injection      Patient: Trice Melara        YOB: 1940            Chief Complaints:bilateral Knee pain  Chief Complaint   Patient presents with   • Left Knee - Injections   • Right Knee - Injections       History of Present Illness: Pt gets intermittent  injections with good relief. Is here for repeat injection. Understands options.      Physical Exam: 78 y.o. female  General Appearance:    Alert, cooperative, in no acute distress                 There were no vitals filed for this visit.   Patient is alert and read ×3 no acute distress appears her above-listed at height weight and age.  Affect is normal respiratory rate is normal unlabored. Heart rate regular rate rhythm, sclera, dentition and hearing are normal for the purpose of this exam.  Exam and complaints are unchanged.      Procedure:  Large Joint Arthrocentesis: R knee  Date/Time: 1/22/2019 12:18 PM  Consent given by: patient  Site marked: site marked  Timeout: Immediately prior to procedure a time out was called to verify the correct patient, procedure, equipment, support staff and site/side marked as required   Supporting Documentation  Indications: pain   Procedure Details  Location: knee - R knee  Preparation: Patient was prepped and draped in the usual sterile fashion  Needle size: 22 G  Approach: anteromedial  Medications administered: 80 mg methylPREDNISolone acetate 80 MG/ML; 4 mL lidocaine PF 2% 2 %  Patient tolerance: patient tolerated the procedure well with no immediate complications    Large Joint Arthrocentesis: L knee  Date/Time: 1/22/2019 12:19 PM  Consent given by: patient  Site marked: site marked  Timeout: Immediately prior to procedure a time out was called to verify the correct patient, procedure, equipment, support staff and site/side marked as required   Supporting Documentation  Indications: pain   Procedure Details  Location: knee - L knee  Preparation: Patient was prepped and draped in the usual  sterile fashion  Needle size: 22 G  Approach: anteromedial  Medications administered: 80 mg methylPREDNISolone acetate 80 MG/ML; 4 mL lidocaine PF 2% 2 %  Patient tolerance: patient tolerated the procedure well with no immediate complications        X-rays AP lateral merchant view of both knees were taken to evaluate her symptoms and compared to previous films she has severe bone-on-bone bilaterally on the medial compartment which is complete loss of joint space with varus alignment.  She has evidence of intramedullary nailing on the right her x-rays are unchanged        Assessment. Persistent knee pain      Plan: Is to proceed with injection    The knee joint was injected under strict sterile technique with Marcaine and Depo-Medrol this was done sterilely and tolerated tolerated well.  Symptoms are worsening she wishes to proceed the next step I will have her talk to Dr. Glover.  I will start her into physical therapy prior to seeing

## 2019-02-05 ENCOUNTER — HOSPITAL ENCOUNTER (OUTPATIENT)
Dept: PHYSICAL THERAPY | Facility: HOSPITAL | Age: 79
Setting detail: THERAPIES SERIES
Discharge: HOME | End: 2019-02-05
Attending: ORTHOPAEDIC SURGERY

## 2019-02-05 DIAGNOSIS — G89.29 CHRONIC PAIN OF BOTH KNEES: Primary | ICD-10-CM

## 2019-02-05 DIAGNOSIS — M25.562 CHRONIC PAIN OF BOTH KNEES: Primary | ICD-10-CM

## 2019-02-05 DIAGNOSIS — M25.561 CHRONIC PAIN OF BOTH KNEES: Primary | ICD-10-CM

## 2019-02-05 DIAGNOSIS — M17.0 PRIMARY OSTEOARTHRITIS OF BOTH KNEES: ICD-10-CM

## 2019-02-05 PROCEDURE — 97110 THERAPEUTIC EXERCISES: CPT | Performed by: PHYSICAL THERAPIST

## 2019-02-05 PROCEDURE — 97161 PT EVAL LOW COMPLEX 20 MIN: CPT | Performed by: PHYSICAL THERAPIST

## 2019-02-05 NOTE — THERAPY EVALUATION
Outpatient Physical Therapy Ortho Initial Evaluation  [facility]     Patient Name: Trice Melara  : 1940  MRN: [id number]  Today's Date: 2019      Visit Date: 2019    Patient Active Problem List   Diagnosis   • Gastroesophageal reflux disease   • Knee pain   • Iron deficiency anemia   • Abnormal kidney function   • Acute renal failure (CMS/HCC)   • Arthritis of knee   • Arthritis   • Osteoarthrosis, localized, primary, knee   • Primary osteoarthritis of knees, bilateral   • Closed displaced intertrochanteric fracture of right femur (CMS/HCC)   • Fall   • Osteoporosis        Past Medical History:   Diagnosis Date   • Abdominal wall dehiscence 2016    Fascial Dehiscence   • Acute renal failure (CMS/HCC)    • Aspiration pneumonia (CMS/HCC)    • Colovaginal fistula    • Diverticulitis     with paracolonic abscess and colovesical fistula,    • Gastric ulcer    • GERD (gastroesophageal reflux disease)    • H/O Bowel perforation    • History of diverticular abscess    • History of GI bleed    • Incisional hernia 2016   • Osteoarthritis    • Peritonitis (CMS/HCC) 2016   • Septic shock (CMS/HCC)    • Small bowel anastomotic leak 2016        Past Surgical History:   Procedure Laterality Date   • BREAST CYST EXCISION     • COLON RESECTION  2016    Smal Bowel Resection x2-Dr. Syd Maharaj   • COLON SURGERY Left     with closure of fistula   • EXPLORATORY LAPAROTOMY  2015    With Extensive Adhesiolysis of over 1 hour to allow conduct of the operation and repair of small intestine-Dr. Syd Maharaj   • EXPLORATORY LAPAROTOMY  2016    Dr. Syd Maharaj   • INCISIONAL HERNIA REPAIR  2016    Incisional Hernia Repair with Fascial Dehiscence Repair with Mesh and Bilateral Component Separation-Dr. Syd Maharaj   • IN OPEN FIX INTER/SUBTROCH FX,IMPLNT Right 2017    Procedure: FEMUR INTRAMEDULLARY NAILING/RODDING;  Surgeon: Huan LAMAS  MD Abhishek;  Location: [facility] OR;  Service: Orthopedics   • TOTAL ABDOMINAL HYSTERECTOMY  1970s       Visit Dx:     ICD-10-CM ICD-9-CM   1. Chronic pain of both knees M25.561 719.46    M25.562 338.29    G89.29    2. Primary osteoarthritis of both knees M17.0 715.16       Patient History     Row Name 02/05/19 1500             History    Chief Complaint  Difficulty Walking;Difficulty with daily activities;Pain;Joint swelling  -      Date Current Problem(s) Began  -- chronic  -      Brief Description of Current Complaint  Pt with a history of B knee pain for many years. She was in the hospital for a while and had some therapy at that time. Then she fell about 2 years ago and had a R femur fracture with ORIF with IM yi. (july 2017).. She had HHPT only. Her knees have progressively gotten worse, R>L pain wise. She has been seeing Dr. Jacobson and doing injections, which aren't lasting long at this time. . She lives at home with her  in a house. All main living is on the first floor. Garage and her office is in the basement. She has an electric chair for the stairs. 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  mobic, hydrocondone  -         Pain     Pain Location  Knee  -      Pain at Present  5  -LH      Pain at Best  0  -LH      Pain at Worst  5  -LH      Pain Frequency  Intermittent  -      Pain Description  Aching;Sore  -      What Performance Factors Make the Current Problem(s) WORSE?  stairs, standing, walking, squatting  -      What Performance Factors Make  the Current Problem(s) BETTER?  Norco (prn), rest  -LH      Is your sleep disturbed?  Yes sometimes  -LH         Fall Risk Assessment    Any falls in the past year:  No  -LH         Services    Prior Rehab/Home Health Experiences  Yes  -LH      Are you currently receiving Home Health services  No  -LH         Daily Activities    Primary Language  English  -      How does patient learn best?  Listening;Reading  -      Teaching needs identified  Home Exercise Program;Management of Condition  -      Patient is concerned about/has problems with  Climbing Stairs;Performing home management (household chores, shopping, care of dependents);Standing;Walking  -LH      Does patient have problems with the following?  None  -LH      Barriers to learning  None  -LH      Pt Participated in POC and Goals  Yes  -LH         Safety    Are you being hurt, hit, or frightened by anyone at home or in your life?  No  -LH      Are you being neglected by a caregiver  No  -        User Key  (r) = Recorded By, (t) = Taken By, (c) = Cosigned By    Initials Name Provider Type     Amanda Chino, PT Physical Therapist          PT Ortho     Row Name 02/05/19 1500       Posture/Observations    Iliac crests  Left:;Elevated  -LH    Genu varus  Right:;Severe;Left:;Moderate  -LH       Knee Special Tests    Valgus stress (MCL lesion)  Negative  -LH    Varus stress (LCL lesion)  Bilateral:;Positive  -LH       Right Lower Ext    Rt Knee Extension/Flexion AROM  ext=18 deg, flex=105 deg  -LH       Left Lower Ext    Lt Knee Extension/Flexion AROM  ext=10 deg, flex=108 deg  -LH       MMT Right Lower Ext    Rt Hip Flexion MMT, Gross Movement  (4/5) good  -LH    Rt Hip ABduction MMT, Gross Movement  (3+/5) fair plus  -LH    Rt Knee Extension MMT, Gross Movement  (4-/5) good minus  -LH    Rt Knee Flexion MMT, Gross Movement  (4-/5) good minus  -LH       MMT Left Lower Ext    Lt Hip Flexion MMT, Gross Movement  (4/5) good  -LH    Lt Hip ABduction  MMT, Gross Movement  (4-/5) good minus  -LH    Lt Knee Extension MMT, Gross Movement  (4/5) good  -LH    Lt Knee Flexion MMT, Gross Movement  (4-/5) good minus  -LH       Lower Extremity Flexibility    Hamstrings  Bilateral:;Moderately limited  -LH    Gastrocnemius  Bilateral:;Mildly limited;Moderately limited  -LH       Gait/Stairs Assessment/Training    Comment (Gait/Stairs)  moderate antalgia with glute med shift over R>L LEs in stance phase of gait with SC  -      User Key  (r) = Recorded By, (t) = Taken By, (c) = Cosigned By    Initials Name Provider Type     Amanda Chino, PT Physical Therapist                      Therapy Education  Given: HEP, Symptoms/condition management  Program: New  How Provided: Verbal, Demonstration, Written  Provided to: Patient  Level of Understanding: Teach back education performed, Verbalized, Demonstrated     PT OP Goals     Row Name 02/05/19 1500          PT Short Term Goals    STG 1  Patient will be independent with education for symptom management, joint protection and strategies to minimize stress on affected tissues  -     STG 1 Progress  New  -     STG 2  Pt to improve R knee ROM in ext to 12 deg for improved gait pattern  -     STG 2 Progress  [facility]     STG 3  Pt to improve L knee ROM in ext to 7 deg for improved gait pattern  -     STG 3 Progress  New  [facility]        Long Term Goals    LTG 1  Patient will increase knee strength to 4/5 to improve functional mobility  -     LTG 1 Progress  New  -     LTG 2  Patient will demonstrate an independent HEP for core and knee strength and flexibility/ROM.  -     LTG 2 Progress  New  -LH        Time Calculation    PT Goal Re-Cert Due Date  04/05/19  -       User Key  (r) = Recorded By, (t) = Taken By, (c) = Cosigned By    Initials Name Provider Type     Amanda Chino, PT Physical Therapist          PT Assessment/Plan     Row Name 02/05/19 1600          PT Assessment    Functional Limitations  Impaired  gait;Limitation in home management;Limitations in community activities;Limitations in functional capacity and performance;Performance in leisure activities;Performance in self-care ADL  -     Impairments  Gait;Impaired flexibility;Joint integrity;Joint mobility;Muscle strength;Pain;Range of motion  -     Assessment Comments  Trice Melara is a 78 y.o. year-old female referred to physical therapy for B knee pain. She presents with a evolving clinical presentation.  She has comorbidities of end stage OA and no personal factors that may affect her progress in the plan of care.  She ambulates with moderate antalgia with a SC due to lacking 18 deg of R knee ext and 10 deg of L knee ext. She has a previous history of R femur ORIF with IM yi and weakness of her hips R>L and knees.  Pt would benefit from therapy to help improve her ROM, strength, and flexibility to help prepare her for a TKA.  -     Please refer to paper survey for additional self-reported information  Yes  -     Rehab Potential  Good  -     Patient/caregiver participated in establishment of treatment plan and goals  Yes  -     Patient would benefit from skilled therapy intervention  Yes  -        PT Plan    PT Frequency  1x/week  -     Predicted Duration of Therapy Intervention (Therapy Eval)  4 weeks  -     Planned CPT's?  PT EVAL LOW COMPLEXITY: 58655;PT THER PROC EA 15 MIN: 67164;PT MANUAL THERAPY EA 15 MIN: 29445;PT GAIT TRAINING EA 15 MIN: 02827;PT HOT OR COLD PACK TREAT MCARE  -     Physical Therapy Interventions (Optional Details)  balance training;gait training;home exercise program;joint mobilization;manual therapy techniques;modalities;ROM (Range of Motion);stair training;strengthening;stretching  -     PT Plan Comments  plan to see pt weekly for development of HEP for B knee OA and preparation for a TKA  -       User Key  (r) = Recorded By, (t) = Taken By, (c) = Cosigned By    Initials Name Provider Type     Matti  Amanda ROME, PT Physical Therapist            Exercises     Row Name 02/05/19 1500             Total Minutes    40018 - PT Therapeutic Exercise Minutes  20  -LH         Exercise 1    Exercise Name 1  QS  -LH      Sets 1  1  -LH      Reps 1  10  -LH      Time 1  5 sec  -LH         Exercise 2    Exercise Name 2  SLR  -LH      Sets 2  1  -LH      Reps 2  10  -LH         Exercise 3    Exercise Name 3  hip add iso with pillow'  -LH      Sets 3  1  -LH      Reps 3  10  -LH      Time 3  5 sec  -LH         Exercise 4    Exercise Name 4  hip abd/ER  -LH      Sets 4  1  -LH      Reps 4  10  -LH      Time 4  red  -LH         Exercise 5    Exercise Name 5  seated HS curls  -LH      Sets 5  1  -LH      Reps 5  10  -LH      Time 5  red  -LH         Exercise 6    Exercise Name 6  HS and calf stretching  -LH      Sets 6  1  -LH      Reps 6  3  -LH      Time 6  20 sec  -LH        User Key  (r) = Recorded By, (t) = Taken By, (c) = Cosigned By    Initials Name Provider Type     Amanda Chino, PT Physical Therapist                        Outcome Measure Options: Knee Outcome Score- ADL  Knee Outcome Score  Knee Outcome Score Comments: 56%      Time Calculation:     Therapy Suggested Charges     Code   Minutes Charges    79422 (CPT®) Hc Pt Neuromusc Re Education Ea 15 Min      26521 (CPT®) Hc Pt Ther Proc Ea 15 Min 20 1    94264 (CPT®) Hc Gait Training Ea 15 Min      22446 (CPT®) Hc Pt Therapeutic Act Ea 15 Min      88465 (CPT®) Hc Pt Manual Therapy Ea 15 Min      95393 (CPT®) Hc Pt Ther Massage- Per 15 Min      02718 (CPT®) Hc Pt Iontophoresis Ea 15 Min      13298 (CPT®) Hc Pt Elec Stim Ea-Per 15 Min      89683 (CPT®) Hc Pt Ultrasound Ea 15 Min      92360 (CPT®) Hc Pt Self Care/Mgmt/Train Ea 15 Min      09643 (CPT®) Hc Pt Prosthetic (S) Train Initial Encounter, Each 15 Min      67341 (CPT®) Hc Orthotic(S) Mgmt/Train Initial Encounter, Each 15min      72378 (CPT®) Hc Pt Aquatic Therapy Ea 15 Min      44416 (CPT®) Hc Pt  Orthotic(S)/Prosthetic(S) Encounter, Each 15 Min       (CPT®) Hc Pt Electrical Stim Unattended      Total  20 1          Start Time: 1515  Stop Time: 1600  Time Calculation (min): 45 min  Total Timed Code Minutes- PT: 45 minute(s)     Therapy Charges for Today     Code Description Service Date Service Provider Modifiers Qty    21577427341 HC PT THER PROC EA 15 MIN 2/5/2019 Amanda Chino, PT GP 1    05739797710 HC PT EVAL LOW COMPLEXITY 2 2/5/2019 Amanda Chino, PT GP 1          PT G-Codes  Outcome Measure Options: Knee Outcome Score- ADL         Amanda Chino, PT  2/5/2019

## 2019-02-13 ENCOUNTER — HOSPITAL ENCOUNTER (OUTPATIENT)
Dept: PHYSICAL THERAPY | Facility: HOSPITAL | Age: 79
Setting detail: THERAPIES SERIES
Discharge: HOME | End: 2019-02-13
Attending: ORTHOPAEDIC SURGERY

## 2019-02-13 DIAGNOSIS — M25.561 CHRONIC PAIN OF BOTH KNEES: Primary | ICD-10-CM

## 2019-02-13 DIAGNOSIS — M25.562 CHRONIC PAIN OF BOTH KNEES: Primary | ICD-10-CM

## 2019-02-13 DIAGNOSIS — G89.29 CHRONIC PAIN OF BOTH KNEES: Primary | ICD-10-CM

## 2019-02-13 DIAGNOSIS — M17.0 PRIMARY OSTEOARTHRITIS OF BOTH KNEES: ICD-10-CM

## 2019-02-13 PROCEDURE — 97110 THERAPEUTIC EXERCISES: CPT

## 2019-02-13 NOTE — THERAPY TREATMENT NOTE
Outpatient Physical Therapy Ortho Treatment Note  [facility]     Patient Name: Trice Melara  : 1940  MRN: [id number]  Today's Date: 2019      Visit Date: 2019    Visit Dx:    ICD-10-CM ICD-9-CM   1. Chronic pain of both knees M25.561 719.46    M25.562 338.29    G89.29    2. Primary osteoarthritis of both knees M17.0 715.16       Patient Active Problem List   Diagnosis   • Gastroesophageal reflux disease   • Knee pain   • Iron deficiency anemia   • Abnormal kidney function   • Acute renal failure (CMS/HCC)   • Arthritis of knee   • Arthritis   • Osteoarthrosis, localized, primary, knee   • Primary osteoarthritis of knees, bilateral   • Closed displaced intertrochanteric fracture of right femur (CMS/HCC)   • Fall   • Osteoporosis        Past Medical History:   Diagnosis Date   • Abdominal wall dehiscence 2016    Fascial Dehiscence   • Acute renal failure (CMS/HCC)    • Aspiration pneumonia (CMS/HCC)    • Colovaginal fistula    • Diverticulitis 2010    with paracolonic abscess and colovesical fistula,    • Gastric ulcer    • GERD (gastroesophageal reflux disease)    • H/O Bowel perforation    • History of diverticular abscess    • History of GI bleed    • Incisional hernia 2016   • Osteoarthritis    • Peritonitis (CMS/HCC) 2016   • Septic shock (CMS/[facility])    • Small bowel anastomotic leak 2016        Past Surgical History:   Procedure Laterality Date   • BREAST CYST EXCISION     • COLON RESECTION  2016    Smal Bowel Resection x2-Dr. Syd Maharaj   • COLON SURGERY Left     with closure of fistula   • EXPLORATORY LAPAROTOMY  2015    With Extensive Adhesiolysis of over 1 hour to allow conduct of the operation and repair of small intestine-Dr. Syd Maharaj   • EXPLORATORY LAPAROTOMY  2016    Dr. Syd Maharaj   • INCISIONAL HERNIA REPAIR  2016    Incisional Hernia Repair with Fascial Dehiscence Repair with Mesh and Bilateral  Component Separation-Dr. Syd Maharaj   • [place] OPEN FIX INTER/SUBTROCH FX,IMPLNT Right 7/24/2017    Procedure: FEMUR INTRAMEDULLARY NAILING/RODDING;  Surgeon: Huan Weiss MD;  Location: [facility];  Service: Orthopedics   • TOTAL ABDOMINAL HYSTERECTOMY  1970s       PT Ortho     Row Name 02/13/19 1500       Subjective Comments    Subjective Comments  pt states Dr. Jacobson has referred her to Dr. Glover because she needs eval for right TKA.  Pt describes a busy lifestyle and works still at 78 years old.  She reports no hip pain from her July 2017 hip fx.  -SI       Subjective Pain    Able to rate subjective pain?  yes  -SI    Subjective Pain Comment  right greater than left knee pain daily  -SI       Posture/Observations    Observations  -- wears compression socks due to chronic lower leg edema.    -SI    Posture/Observations Comments  -- left lower leg abrasion with slow healing she reports.....  -SI       Right Lower Ext    Rt Knee Extension/Flexion AROM  -14  -SI       Left Lower Ext    Lt Knee Extension/Flexion AROM  -9  -SI       Gait/Stairs Assessment/Training    Comment (Gait/Stairs)  altered gait noted.  uses cane when oob  -SI      User Key  (r) = Recorded By, (t) = Taken By, (c) = Cosigned By    Initials Name Provider Type    Larissa Saravia, ERI Physical Therapy Assistant                      PT Assessment/Plan     Row Name 02/13/19 1604          PT Assessment    Assessment Comments  Pt very active 78 year old facing end stages OA right knee.  HEP in progress with emphasis on improving knee ext, flexion, and strength  -SI       User Key  (r) = Recorded By, (t) = Taken By, (c) = Cosigned By    Initials Name Provider Type    Larissa Saravia PTA Physical Therapy Assistant              Exercises     Row Name 02/13/19 1500             Subjective Comments    Subjective Comments  pt states Dr. Jacobson has referred her to Dr. Glover because she needs eval for right TKA.  Pt describes a busy lifestyle  and works still at 78 years old.  She reports no hip pain from her July 2017 hip fx.  -SI         Subjective Pain    Able to rate subjective pain?  yes  -SI      Subjective Pain Comment  right greater than left knee pain daily  -SI         Exercise 1    Exercise Name 1  QS  -SI      Sets 1  1  -SI      Reps 1  20  -SI      Time 1  5 sec  -SI         Exercise 2    Exercise Name 2  SLR  -SI      Sets 2  2  -SI      Reps 2  10  -SI         Exercise 3    Exercise Name 3  hip add iso with pillow'  -SI      Sets 3  2  -SI      Reps 3  10  -SI      Time 3  5 sec  -SI         Exercise 4    Exercise Name 4  hip abd/ER red  -SI      Sets 4  2  -SI      Reps 4  10  -SI      Time 4  red  -SI         Exercise 5    Exercise Name 5  seated HS curls  -SI      Sets 5  2  -SI      Reps 5  10  -SI      Time 5  red  -SI         Exercise 6    Exercise Name 6  HS and calf stretching  -SI      Sets 6  1  -SI      Reps 6  3  -SI      Time 6  20 sec  -SI        User Key  (r) = Recorded By, (t) = Taken By, (c) = Cosigned By    Initials Name Provider Type    Larissa Saravia PTA Physical Therapy Assistant                             Therapy Education  Given: HEP, Symptoms/condition management  Program: Progressed  How Provided: Verbal, Demonstration, Written  Provided to: Patient  Level of Understanding: Teach back education performed              Time Calculation:   Start Time: 1415  Stop Time: 1500  Time Calculation (min): 45 min  Total Timed Code Minutes- PT: 45 minute(s)  Therapy Suggested Charges     Code   Minutes Charges    None           Therapy Charges for Today     Code Description Service Date Service Provider Modifiers Qty    09639957367 HC PT THER PROC EA 15 MIN 2/13/2019 Larissa York PTA GP 3                    Larissa York PTA  2/13/2019

## 2019-02-20 ENCOUNTER — HOSPITAL ENCOUNTER (OUTPATIENT)
Dept: PHYSICAL THERAPY | Facility: HOSPITAL | Age: 79
Setting detail: THERAPIES SERIES
Discharge: HOME | End: 2019-02-20
Attending: ORTHOPAEDIC SURGERY

## 2019-02-20 DIAGNOSIS — M25.561 CHRONIC PAIN OF BOTH KNEES: Primary | ICD-10-CM

## 2019-02-20 DIAGNOSIS — M17.0 PRIMARY OSTEOARTHRITIS OF BOTH KNEES: ICD-10-CM

## 2019-02-20 DIAGNOSIS — G89.29 CHRONIC PAIN OF BOTH KNEES: Primary | ICD-10-CM

## 2019-02-20 DIAGNOSIS — M25.562 CHRONIC PAIN OF BOTH KNEES: Primary | ICD-10-CM

## 2019-02-20 PROCEDURE — 97110 THERAPEUTIC EXERCISES: CPT

## 2019-02-20 NOTE — THERAPY TREATMENT NOTE
Outpatient Physical Therapy Ortho Treatment Note  [facility]     Patient Name: Trice Melara  : 1940  MRN: [id number]  Today's Date: 2019      Visit Date: 2019    Visit Dx:    ICD-10-CM ICD-9-CM   1. Chronic pain of both knees M25.561 719.46    M25.562 338.29    G89.29    2. Primary osteoarthritis of both knees M17.0 715.16       Patient Active Problem List   Diagnosis   • Gastroesophageal reflux disease   • Knee pain   • Iron deficiency anemia   • Abnormal kidney function   • Acute renal failure (CMS/HCC)   • Arthritis of knee   • Arthritis   • Osteoarthrosis, localized, primary, knee   • Primary osteoarthritis of knees, bilateral   • Closed displaced intertrochanteric fracture of right femur (CMS/HCC)   • Fall   • Osteoporosis        Past Medical History:   Diagnosis Date   • Abdominal wall dehiscence 2016    Fascial Dehiscence   • Acute renal failure (CMS/HCC)    • Aspiration pneumonia (CMS/HCC)    • Colovaginal fistula    • Diverticulitis 2010    with paracolonic abscess and colovesical fistula,    • Gastric ulcer    • GERD (gastroesophageal reflux disease)    • H/O Bowel perforation    • History of diverticular abscess    • History of GI bleed    • Incisional hernia 2016   • Osteoarthritis    • Peritonitis (CMS/HCC) 2016   • Septic shock (CMS/[facility])    • Small bowel anastomotic leak 2016        Past Surgical History:   Procedure Laterality Date   • BREAST CYST EXCISION     • COLON RESECTION  2016    Smal Bowel Resection x2-Dr. Syd Maharaj   • COLON SURGERY Left     with closure of fistula   • EXPLORATORY LAPAROTOMY  2015    With Extensive Adhesiolysis of over 1 hour to allow conduct of the operation and repair of small intestine-Dr. Syd Maharaj   • EXPLORATORY LAPAROTOMY  2016    Dr. Syd Maharaj   • INCISIONAL HERNIA REPAIR  2016    Incisional Hernia Repair with Fascial Dehiscence Repair with Mesh and Bilateral  Component Separation-Dr. Syd Maharaj   • [place] OPEN FIX INTER/SUBTROCH FX,IMPLNT Right 7/24/2017    Procedure: FEMUR INTRAMEDULLARY NAILING/RODDING;  Surgeon: Huan Weiss MD;  Location: [facility];  Service: Orthopedics   • TOTAL ABDOMINAL HYSTERECTOMY  1970s       PT Ortho     Row Name 02/20/19 1500       Subjective Comments    Subjective Comments  Both knees hurting with the weather such as it is...worked today also.  No better, no worse.  -SI       Subjective Pain    Able to rate subjective pain?  yes  -SI      User Key  (r) = Recorded By, (t) = Taken By, (c) = Cosigned By    Initials Name Provider Type    Larissa Saravia PTA Physical Therapy Assistant                      PT Assessment/Plan     Row Name 02/20/19 1655          PT Assessment    Assessment Comments  Increase antalgic gait today with bilateral sore knees she feels due to weather and end of her work day etc...  -SI       User Key  (r) = Recorded By, (t) = Taken By, (c) = Cosigned By    Initials Name Provider Type    Larissa Saravia PTA Physical Therapy Assistant              Exercises     Row Name 02/20/19 1500             Subjective Comments    Subjective Comments  Both knees hurting with the weather such as it is...worked today also.  No better, no worse.  -SI         Subjective Pain    Able to rate subjective pain?  yes  -SI         Exercise 1    Exercise Name 1  QS  -SI      Sets 1  1  -SI      Reps 1  20  -SI      Time 1  5 sec  -SI         Exercise 2    Exercise Name 2  SLR  -SI      Sets 2  2  -SI      Reps 2  10  -SI         Exercise 3    Exercise Name 3  hip add iso with hip bridge  -SI      Sets 3  2  -SI      Reps 3  10  -SI      Time 3  5 sec  -SI         Exercise 4    Exercise Name 4  hip abd/ER red  -SI      Sets 4  2  -SI      Reps 4  10  -SI      Time 4  red  -SI         Exercise 5    Exercise Name 5  seated HS curls  -SI      Sets 5  2  -SI      Reps 5  10  -SI      Time 5  red  -SI         Exercise 6    Exercise  Name 6  HS and calf stretching  -SI      Sets 6  1  -SI      Reps 6  3  -SI      Time 6  20 sec  -SI        User Key  (r) = Recorded By, (t) = Taken By, (c) = Cosigned By    Initials Name Provider Type    SI Larissa York PTA Physical Therapy Assistant                             Therapy Education  Given: HEP, Symptoms/condition management(discussed using very low heat at posterior knees on really achy sore days, NOT lower legs due to vaxcular changes....)  Program: Reinforced  How Provided: Verbal, Demonstration, Written  Provided to: Patient  Level of Understanding: Teach back education performed              Time Calculation:   Start Time: 1500  Stop Time: 1545  Time Calculation (min): 105 min  Total Timed Code Minutes- PT: 45 minute(s)  Therapy Suggested Charges     Code   Minutes Charges    None           Therapy Charges for Today     Code Description Service Date Service Provider Modifiers Qty    31280104731 HC PT THER PROC EA 15 MIN 2/20/2019 Larissa York PTA GP 3                    Larissa York PTA  2/20/2019

## 2019-02-28 ENCOUNTER — HOSPITAL ENCOUNTER (OUTPATIENT)
Dept: PHYSICAL THERAPY | Facility: HOSPITAL | Age: 79
Setting detail: THERAPIES SERIES
Discharge: HOME | End: 2019-02-28
Attending: ORTHOPAEDIC SURGERY

## 2019-02-28 DIAGNOSIS — G89.29 CHRONIC PAIN OF BOTH KNEES: Primary | ICD-10-CM

## 2019-02-28 DIAGNOSIS — M17.0 PRIMARY OSTEOARTHRITIS OF BOTH KNEES: ICD-10-CM

## 2019-02-28 DIAGNOSIS — M25.562 CHRONIC PAIN OF BOTH KNEES: Primary | ICD-10-CM

## 2019-02-28 DIAGNOSIS — M25.561 CHRONIC PAIN OF BOTH KNEES: Primary | ICD-10-CM

## 2019-02-28 PROCEDURE — 97110 THERAPEUTIC EXERCISES: CPT

## 2019-03-07 ENCOUNTER — OFFICE VISIT (OUTPATIENT)
Dept: ORTHOPEDIC SURGERY | Facility: CLINIC | Age: 79
End: 2019-03-07

## 2019-03-07 VITALS — HEIGHT: 62 IN | BODY MASS INDEX: 24.84 KG/M2 | WEIGHT: 135 LBS | TEMPERATURE: 98.3 F

## 2019-03-07 DIAGNOSIS — M17.11 PRIMARY OSTEOARTHRITIS OF RIGHT KNEE: Primary | ICD-10-CM

## 2019-03-07 PROCEDURE — 99214 OFFICE O/P EST MOD 30 MIN: CPT | Performed by: ORTHOPAEDIC SURGERY

## 2019-03-07 RX ORDER — PREGABALIN 75 MG/1
150 CAPSULE ORAL ONCE
Status: CANCELLED | OUTPATIENT
Start: 2019-04-29 | End: 2019-03-07

## 2019-03-07 RX ORDER — VANCOMYCIN HYDROCHLORIDE 1 G/200ML
15 INJECTION, SOLUTION INTRAVENOUS ONCE
Status: CANCELLED | OUTPATIENT
Start: 2019-04-29 | End: 2019-03-07

## 2019-03-07 RX ORDER — CEFAZOLIN SODIUM 2 G/100ML
2 INJECTION, SOLUTION INTRAVENOUS ONCE
Status: CANCELLED | OUTPATIENT
Start: 2019-04-29 | End: 2019-03-07

## 2019-03-07 RX ORDER — MELOXICAM 7.5 MG/1
15 TABLET ORAL ONCE
Status: CANCELLED | OUTPATIENT
Start: 2019-04-29 | End: 2019-03-07

## 2019-03-07 NOTE — PROGRESS NOTES
Patient: Trice Melara  YOB: 1940 78 y.o. female  Medical Record Number: [id number]    Chief Complaints:   Chief Complaint   Patient presents with   • Right Knee - Establish Care, Pain       History of Present Illness:Trice Melara is a 78 y.o. female who presents with complaints of severe right greater than left knee pain.  It has been ongoing for years but has markedly worsened over the last year.  She now has pain which limits her basic activities of daily living.  Physical therapy exercises injections and anti-inflammatories as well as activity modification have not controlled her symptoms.    Allergies: No Known Allergies    Medications:   Current Outpatient Medications   Medication Sig Dispense Refill   • acetaminophen (TYLENOL) 500 MG tablet Take 500 mg by mouth 2 (Two) Times a Day.     • Calcium Carbonate-Vitamin D (CALCIUM-VITAMIN D) 500-200 MG-UNIT per tablet Take 1 tablet by mouth 2 (Two) Times a Day With Meals.     • diphenhydrAMINE (BENADRYL) 25 mg capsule Take 25 mg by mouth 2 (Two) Times a Day.     • diphenoxylate-atropine (LOMOTIL) 2.5-0.025 MG per tablet Take 1 tablet by mouth 4 (Four) Times a Day As Needed.     • HYDROcodone-acetaminophen (NORCO) 5-325 MG per tablet Take 1 tablet by mouth Every 6 (Six) Hours As Needed for Moderate Pain . 25 tablet 0   • meloxicam (MOBIC) 7.5 MG tablet Take 1 tablet by mouth Daily. 90 tablet 1   • Multiple Vitamins-Minerals (MULTIVITAL) tablet Take 1 tablet by mouth Daily.     • omeprazole (priLOSEC) 40 MG capsule TAKE 1 CAPSULE DAILY 90 capsule 3     No current facility-administered medications for this visit.          The following portions of the patient's history were reviewed and updated as appropriate: allergies, current medications, past family history, past medical history, past social history, past surgical history and problem list.    Review of Systems:   A 14 point review of systems was performed. All systems negative except pertinent  "positives/negative listed in HPI above    Physical Exam:   Vitals:    03/07/19 1530   Temp: 98.3 °F (36.8 °C)   Weight: 61.2 kg (135 lb)   Height: 157.5 cm (62\")       General: A and O x 3, ASA, NAD    SCLERA:    Normal    DENTITION:   Normal  Knee:  right    ALIGNMENT:     Varus  ,   Patella  tracks  midline    GAIT:    Antalgic    SKIN:    No abnormality    RANGE OF MOTION:   3  -  90   DEG    STRENGTH:   4  / 5    LIGAMENTS:    No varus / valgus instability.   Negative  Lachman.    MENISCUS:     Negative   Alex       DISTAL PULSES:    Paplable    DISTAL SENSATION :   Intact    LYMPHATICS:     No   lymphadenopathy    OTHER:          - Positive   effusion      - Crepitance with ROM          Radiology:  Xrays 3views both knees (ap,lateral, sunrise) taken previously demonstrating advanced varus osteoarthritis with bone on bone articulation, subchondral cysts, and periarticular osteophytes.  She has a femoral nail which ends just above the right knee joint    Assessment/Plan: Right greater than left knee end-stage advanced osteoarthritis.  She has failed a full complement of conservative measures.  Continuation of conservative management vs. TKA discussed.  The patient wishes to proceed with total knee replacement.  At this point the patient has failed the full compliment of conservative treatment and stating complete understanding of the risks/benefits/ anternatives wishes to proceed with surgical treatment.    Risk and benefits of surgery were reviewed.  Including, but not limited to, blood clots or pulmonary embolism, anesthesia risk, infection, fracture, skin/leg numbness, persistent pain/crepitance/popping/catching, failure of the implant, need for future surgeries, hematoma, possible nerve or blood vessel injury, need for transfusion, and potential risk of stroke,heart attack or death, among others.  The patient understands and wishes to proceed.     It was explained that if tissue has been repaired or " reconstructed, there is also an increased chance of failure which may require further management.  Following the completion of the discussion, the patient expressed understanding of this planned course of care, all their questions were answered and consent will be obtained preoperatively.    Operative Plan: right [employer] Total Knee Replacement an overnight staywith home health rehab-spinal anesthesia with [place] navigation due to femoral nail        Bolivar Glover MD  3/7/2019

## 2019-03-15 PROBLEM — M17.11 PRIMARY OSTEOARTHRITIS OF RIGHT KNEE: Status: ACTIVE | Noted: 2019-03-15

## 2019-03-26 ENCOUNTER — TELEPHONE (OUTPATIENT)
Dept: INTERNAL MEDICINE | Facility: CLINIC | Age: 79
End: 2019-03-26

## 2019-03-26 DIAGNOSIS — M17.0 PRIMARY OSTEOARTHRITIS OF KNEES, BILATERAL: ICD-10-CM

## 2019-03-26 RX ORDER — HYDROCODONE BITARTRATE AND ACETAMINOPHEN 5; 325 MG/1; MG/1
1 TABLET ORAL EVERY 6 HOURS PRN
Qty: 25 TABLET | Refills: 0 | Status: SHIPPED | OUTPATIENT
Start: 2019-03-26 | End: 2019-04-30 | Stop reason: HOSPADM

## 2019-04-09 RX ORDER — MELOXICAM 7.5 MG/1
7.5 TABLET ORAL DAILY
Qty: 90 TABLET | Refills: 1 | Status: SHIPPED | OUTPATIENT
Start: 2019-04-09 | End: 2019-10-15 | Stop reason: SDUPTHER

## 2019-04-18 ENCOUNTER — APPOINTMENT (OUTPATIENT)
Dept: PREADMISSION TESTING | Facility: HOSPITAL | Age: 79
End: 2019-04-18

## 2019-04-18 VITALS
DIASTOLIC BLOOD PRESSURE: 88 MMHG | OXYGEN SATURATION: 98 % | WEIGHT: 128 LBS | SYSTOLIC BLOOD PRESSURE: 151 MMHG | HEIGHT: 62 IN | HEART RATE: 104 BPM | TEMPERATURE: 97.1 F | RESPIRATION RATE: 18 BRPM | BODY MASS INDEX: 23.55 KG/M2

## 2019-04-18 DIAGNOSIS — M17.11 PRIMARY OSTEOARTHRITIS OF RIGHT KNEE: ICD-10-CM

## 2019-04-18 LAB
ANION GAP SERPL CALCULATED.3IONS-SCNC: 11 MMOL/L
BILIRUB UR QL STRIP: NEGATIVE
BUN BLD-MCNC: 14 MG/DL (ref 8–23)
BUN/CREAT SERPL: 20.6 (ref 7–25)
CALCIUM SPEC-SCNC: 9.9 MG/DL (ref 8.6–10.5)
CHLORIDE SERPL-SCNC: 99 MMOL/L (ref 98–107)
CLARITY UR: CLEAR
CO2 SERPL-SCNC: 28 MMOL/L (ref 22–29)
COLOR UR: YELLOW
CREAT BLD-MCNC: 0.68 MG/DL (ref 0.57–1)
DEPRECATED RDW RBC AUTO: 45.2 FL (ref 37–54)
ERYTHROCYTE [DISTWIDTH] IN BLOOD BY AUTOMATED COUNT: 14.1 % (ref 12.3–15.4)
GFR SERPL CREATININE-BSD FRML MDRD: 83 ML/MIN/1.73
GLUCOSE BLD-MCNC: 91 MG/DL (ref 65–99)
GLUCOSE UR STRIP-MCNC: NEGATIVE MG/DL
HCT VFR BLD AUTO: 44.6 % (ref 34–46.6)
HGB BLD-MCNC: 13.6 G/DL (ref 12–15.9)
HGB UR QL STRIP.AUTO: NEGATIVE
KETONES UR QL STRIP: NEGATIVE
LEUKOCYTE ESTERASE UR QL STRIP.AUTO: NEGATIVE
MCH RBC QN AUTO: 27 PG (ref 26.6–33)
MCHC RBC AUTO-ENTMCNC: 30.5 G/DL (ref 31.5–35.7)
MCV RBC AUTO: 88.7 FL (ref 79–97)
NITRITE UR QL STRIP: NEGATIVE
PH UR STRIP.AUTO: 7 [PH] (ref 5–8)
PLATELET # BLD AUTO: 246 10*3/MM3 (ref 140–450)
PMV BLD AUTO: 10.8 FL (ref 6–12)
POTASSIUM BLD-SCNC: 3.9 MMOL/L (ref 3.5–5.2)
PROT UR QL STRIP: NEGATIVE
RBC # BLD AUTO: 5.03 10*6/MM3 (ref 3.77–5.28)
SODIUM BLD-SCNC: 138 MMOL/L (ref 136–145)
SP GR UR STRIP: 1.01 (ref 1–1.03)
UROBILINOGEN UR QL STRIP: NORMAL
WBC NRBC COR # BLD: 8.36 10*3/MM3 (ref 3.4–10.8)

## 2019-04-18 PROCEDURE — 63710000001 MUPIROCIN 2 % OINTMENT: Performed by: ORTHOPAEDIC SURGERY

## 2019-04-18 PROCEDURE — 93010 ELECTROCARDIOGRAM REPORT: CPT | Performed by: INTERNAL MEDICINE

## 2019-04-18 PROCEDURE — 80048 BASIC METABOLIC PNL TOTAL CA: CPT | Performed by: ORTHOPAEDIC SURGERY

## 2019-04-18 PROCEDURE — A9270 NON-COVERED ITEM OR SERVICE: HCPCS | Performed by: ORTHOPAEDIC SURGERY

## 2019-04-18 PROCEDURE — 93005 ELECTROCARDIOGRAM TRACING: CPT

## 2019-04-18 PROCEDURE — 36415 COLL VENOUS BLD VENIPUNCTURE: CPT

## 2019-04-18 PROCEDURE — 85027 COMPLETE CBC AUTOMATED: CPT | Performed by: ORTHOPAEDIC SURGERY

## 2019-04-18 PROCEDURE — 81003 URINALYSIS AUTO W/O SCOPE: CPT | Performed by: ORTHOPAEDIC SURGERY

## 2019-04-18 RX ORDER — OMEPRAZOLE 40 MG/1
40 CAPSULE, DELAYED RELEASE ORAL NIGHTLY
COMMUNITY
End: 2019-08-19 | Stop reason: SDUPTHER

## 2019-04-18 ASSESSMENT — KOOS JR
KOOS JR SCORE: 14
KOOS JR SCORE: 52.465

## 2019-04-18 NOTE — DISCHARGE INSTRUCTIONS
Take the following medications the morning of surgery with a small sip of water:  NONE      General Instructions: CLEAR LIQUIDS UNTIL 9:30 AM MORNING OF SURGERY  • Do not eat solid food after midnight the night before surgery.  • You may drink clear liquids day of surgery but must stop at least one hour before your hospital arrival time.  • It is beneficial for you to have a clear drink that contains carbohydrates the day of surgery.  We suggest a 12 to 20 ounce bottle of Gatorade or Powerade for non-diabetic patients or a 12 to 20 ounce bottle of G2 or Powerade Zero for diabetic patients. (Pediatric patients, are not advised to drink a 12 to 20 ounce carbohydrate drink)    Clear liquids are liquids you can see through.  Nothing red in color.     Plain water                               Sports drinks  Sodas                                   Gelatin (Jell-O)  Fruit juices without pulp such as white grape juice and apple juice  Popsicles that contain no fruit or yogurt  Tea or coffee (no cream or milk added)  Gatorade / Powerade  G2 / Powerade Zero    • Infants may have breast milk up to four hours before surgery.  • Infants drinking formula may drink formula up to six hours before surgery.   • Patients who avoid smoking, chewing tobacco and alcohol for 4 weeks prior to surgery have a reduced risk of post-operative complications.  Quit smoking as many days before surgery as you can.  • Do not smoke, use chewing tobacco or drink alcohol the day of surgery.   • If applicable bring your C-PAP/ BI-PAP machine.  • Bring any papers given to you in the doctor’s office.  • Wear clean comfortable clothes and socks.  • Do not wear contact lenses, false eyelashes or make-up.  Bring a case for your glasses.   • Bring crutches or walker if applicable.  • Remove all piercings.  Leave jewelry and any other valuables at home.  • Hair extensions with metal clips must be removed prior to surgery.  • The Pre-Admission Testing nurse  will instruct you to bring medications if unable to obtain an accurate list in Pre-Admission Testing.        If you were given a blood bank ID arm band remember to bring it with you the day of surgery.    Preventing a Surgical Site Infection:  • For 2 to 3 days before surgery, avoid shaving with a razor because the razor can irritate skin and make it easier to develop an infection.    • Any areas of open skin can increase the risk of a post-operative wound infection by allowing bacteria to enter and travel throughout the body.  Notify your surgeon if you have any skin wounds / rashes even if it is not near the expected surgical site.  The area will need assessed to determine if surgery should be delayed until it is healed.  • The night prior to surgery sleep in a clean bed with clean clothing.  Do not allow pets to sleep with you.  • Shower on the morning of surgery using a fresh bar of anti-bacterial soap (such as Dial) and clean washcloth.  Dry with a clean towel and dress in clean clothing.  • Ask your surgeon if you will be receiving antibiotics prior to surgery.  • Make sure you, your family, and all healthcare providers clean their hands with soap and water or an alcohol based hand  before caring for you or your wound.    Day of surgery: 4/29/2019 ARRIVAL TIME 10:30 AM  Upon arrival, a Pre-op nurse and Anesthesiologist will review your health history, obtain vital signs, and answer questions you may have.  The only belongings needed at this time will be your home medications and if applicable your C-PAP/BI-PAP machine.  If you are staying overnight your family can leave the rest of your belongings in the car and bring them to your room later.  A Pre-op nurse will start an IV and you may receive medication in preparation for surgery, including something to help you relax.  Your family will be able to see you in the Pre-op area.  While you are in surgery your family should notify the waiting room   if they leave the waiting room area and provide a contact phone number.    Please be aware that surgery does come with discomfort.  We want to make every effort to control your discomfort so please discuss any uncontrolled symptoms with your nurse.   Your doctor will most likely have prescribed pain medications.      If you are going home after surgery you will receive individualized written care instructions before being discharged.  A responsible adult must drive you to and from the hospital on the day of your surgery and stay with you for 24 hours.    If you are staying overnight following surgery, you will be transported to your hospital room following the recovery period.  [facility] has all private rooms.    You have received a list of surgical assistants for your reference.  If you have any questions please call Pre-Admission Testing at [phone].  Deductibles and co-payments are collected on the day of service. Please be prepared to pay the required co-pay, deductible or deposit on the day of service as defined by your plan.    2% CHLORAHEXIDINE GLUCONATE* CLOTH  Preparing or “prepping” skin before surgery can reduce the risk of infection at the surgical site. To make the process easier, [facility] has chosen disposable cloths moistened with a rinse-free, 2% Chlorhexidine Gluconate (CHG) antiseptic solution. The steps below outline the prepping process and should be carefully followed.        Use the prep cloth on the area that is circled in the diagram             Directions Night before Surgery  1) Shower using a fresh bar of anti-bacterial soap (such as Dial) and clean washcloth.  Use a clean towel to completely dry your skin.  2) Do not use any lotions, oils or creams on your skin.  3) Open the package and remove 1 cloth, wipe your skin for 30 seconds in a circular motion.  Allow to dry for 3 minutes.  4) Repeat #3 with second cloth.  5) Do not touch your eyes,  ears, or mouth with the prep cloth.  6) Allow the wet prep solution to air dry.  7) Discard the prep cloth and wash your hands with soap and water.   8) Dress in clean bed clothes and sleep on fresh clean bed sheets.   9) You may experience some temporary itching after the prep.    Directions Day of Surgery  1) Repeat steps 1,2,3,4,5,6,7, and 9.   2) Dress in clean clothes before coming to the hospital.    BACTROBAN NASAL OINTMENT  There are many germs normally in your nose. Bactroban is an ointment that will help reduce these germs. Please follow these instructions for Bactroban use:      ____The day before surgery in the morning  Date________    ____The day before surgery in the evening              Date________    ____The day of surgery in the morning    Date________    **Squirt ½ package of Bactroban Ointment onto a cotton applicator and apply to inside of 1st nostril.  Squirt the remaining Bactroban and apply to the inside of the other nostril.

## 2019-04-25 ENCOUNTER — TELEPHONE (OUTPATIENT)
Dept: ORTHOPEDIC SURGERY | Facility: CLINIC | Age: 79
End: 2019-04-25

## 2019-04-25 ENCOUNTER — OFFICE VISIT (OUTPATIENT)
Dept: ORTHOPEDIC SURGERY | Facility: CLINIC | Age: 79
End: 2019-04-25

## 2019-04-25 VITALS
DIASTOLIC BLOOD PRESSURE: 78 MMHG | SYSTOLIC BLOOD PRESSURE: 150 MMHG | BODY MASS INDEX: 23.55 KG/M2 | WEIGHT: 128 LBS | TEMPERATURE: 98.7 F | HEIGHT: 62 IN

## 2019-04-25 DIAGNOSIS — M17.11 PRIMARY OSTEOARTHRITIS OF RIGHT KNEE: Primary | ICD-10-CM

## 2019-04-25 PROCEDURE — S0260 H&P FOR SURGERY: HCPCS | Performed by: NURSE PRACTITIONER

## 2019-04-25 PROCEDURE — 77077 JOINT SURVEY SINGLE VIEW: CPT | Performed by: ORTHOPAEDIC SURGERY

## 2019-04-25 NOTE — H&P
Patient: Trice Melara    Date of Admission: 4/29/19    YOB: 1940    Medical Record Number: [id number]    Admitting Physician: Dr. Bolivar Glover    Reason for Admission: End Stage Right Knee OA    History of Present Illness: 78 y.o. female presents with severe end stage knee osteoarthritis which has not been responsive to the full compliment of conservative measures. Despite conservative attempts, there is still severe, constant activity limiting pain. Given the severity of the pain, the patient has elected to proceed with knee replacement.    Allergies: No Known Allergies      Current Medications:  Scheduled Meds:  PRN Meds:.    PMH:     Past Medical History:   Diagnosis Date   • Abdominal wall dehiscence 01/08/2016    Fascial Dehiscence   • Acute renal failure (CMS/HCC)    • Aspiration pneumonia (CMS/HCC)    • Colovaginal fistula 2004   • Diverticulitis 2010    with paracolonic abscess and colovesical fistula, 2004   • Gastric ulcer    • GERD (gastroesophageal reflux disease)    • H/O Bowel perforation 2004   • History of diverticular abscess    • History of GI bleed 2010   • Osteoarthritis    • Peritonitis (CMS/HCC) 01/2016   • Septic shock (CMS/HCC)    • Small bowel anastomotic leak 01/08/2016       PF/Surg/Soc Hx:     Past Surgical History:   Procedure Laterality Date   • BREAST CYST EXCISION     • COLON RESECTION  01/08/2016    Smal Bowel Resection -Dr. Syd Maharaj   • COLON SURGERY Left 2010    with closure of fistula   • EXPLORATORY LAPAROTOMY  12/31/2015    With Extensive Adhesiolysis of over 1 hour to allow conduct of the operation and repair of small intestine-Dr. Syd Maharaj   • EXPLORATORY LAPAROTOMY  01/08/2016    Dr. Syd Maharaj   • INCISIONAL HERNIA REPAIR  01/08/2016    Incisional Hernia Repair with Fascial Dehiscence Repair with Mesh and Bilateral Component Separation-Dr. Syd Maharaj   • MA OPEN FIX INTER/SUBTROCH FX,IMPLNT Right 7/24/2017    Procedure: FEMUR  "INTRAMEDULLARY NAILING/RODDING;  Surgeon: Huan Weiss MD;  Location: [facility] OR;  Service: Orthopedics   • TOTAL ABDOMINAL HYSTERECTOMY  1970s        Social History     Occupational History     Comment: Part-time billing for a psychotherapist   Tobacco Use   • Smoking status: Current Some Day Smoker     Packs/day: 0.25     Years: 50.00     Pack years: 12.50     Types: Cigarettes   • Smokeless tobacco: Never Used   • Tobacco comment: 5-6 CIG DAY   Substance and Sexual Activity   • Alcohol use: Yes     Comment: 1-2 per evening   • Drug use: No   • Sexual activity: Not on file      Social History     Social History Narrative   • Not on file        Family History   Problem Relation Age of Onset   • Dementia Mother    • Leukemia Father    • Malig Hyperthermia Neg Hx          Review of Systems:   A 14 point review of systems was performed, pertinent positives discussed above, all other systems are negative    Physical Exam: 78 y.o. female  Vital Signs :   Vitals:    04/25/19 1408   BP: 150/78   BP Location: Left arm   Patient Position: Sitting   Temp: 98.7 °F (37.1 °C)   TempSrc: Temporal   Weight: 58.1 kg (128 lb)   Height: 157.5 cm (62\")     General: Alert and Oriented x 3, No acute distress.  Psych: mood and affect appropriate; recent and remote memory intact  Eyes: conjunctiva clear; pupils equally round and reactive, sclera nonicteric  CV: no peripheral edema  Resp: normal respiratory effort  Skin: no rashes or wounds; normal turgor  Musculosketetal; pain and crepitance with knee range of motion  Vascular: palpable distal pulses    Xrays:  -3 views (AP, lateral, and sunrise) were reviewed demonstrating end-stage OA with bone on bone articulation.  -A full length AP xray was ordered and reviewed today for purposes of operative alignment demonstrating end stage arthritic findings. There are no previous full length films for review    Assessment:  End-stage Right knee osteoarthritis. Conservative measures have " failed.      Plan:  The plan is to proceed with Right Total Knee Replacement. The patient voiced understanding of the risks, benefits, and alternative forms of treatment that were discussed with Dr Glover at the time of scheduling.  Patient's been going home with home health next day    Giulia Hare, APRN  4/25/2019

## 2019-04-25 NOTE — TELEPHONE ENCOUNTER
LMOM for patient to return call to office. As per MLL, patient cannot drive for at least 2 weeks after surgery.  It will be assessed at first post op appt.

## 2019-04-29 ENCOUNTER — APPOINTMENT (OUTPATIENT)
Dept: GENERAL RADIOLOGY | Facility: HOSPITAL | Age: 79
End: 2019-04-29

## 2019-04-29 ENCOUNTER — ANESTHESIA (OUTPATIENT)
Dept: PERIOP | Facility: HOSPITAL | Age: 79
End: 2019-04-29

## 2019-04-29 ENCOUNTER — ANESTHESIA EVENT (OUTPATIENT)
Dept: PERIOP | Facility: HOSPITAL | Age: 79
End: 2019-04-29

## 2019-04-29 ENCOUNTER — HOSPITAL ENCOUNTER (OUTPATIENT)
Facility: HOSPITAL | Age: 79
Discharge: HOME HEALTH | End: 2019-04-30
Attending: ORTHOPAEDIC SURGERY | Admitting: ORTHOPAEDIC SURGERY

## 2019-04-29 DIAGNOSIS — M17.11 PRIMARY OSTEOARTHRITIS OF RIGHT KNEE: ICD-10-CM

## 2019-04-29 PROBLEM — M17.9 OA (OSTEOARTHRITIS) OF KNEE: Status: ACTIVE | Noted: 2019-04-29

## 2019-04-29 PROCEDURE — 94640 AIRWAY INHALATION TREATMENT: CPT

## 2019-04-29 PROCEDURE — 25010000002 ONDANSETRON PER 1 MG: Performed by: NURSE PRACTITIONER

## 2019-04-29 PROCEDURE — C1713 ANCHOR/SCREW BN/BN,TIS/BN: HCPCS | Performed by: ORTHOPAEDIC SURGERY

## 2019-04-29 PROCEDURE — 27447 TOTAL KNEE ARTHROPLASTY: CPT | Performed by: NURSE PRACTITIONER

## 2019-04-29 PROCEDURE — 25010000002 DEXAMETHASONE PER 1 MG: Performed by: NURSE ANESTHETIST, CERTIFIED REGISTERED

## 2019-04-29 PROCEDURE — 25010000002 ONDANSETRON PER 1 MG: Performed by: NURSE ANESTHETIST, CERTIFIED REGISTERED

## 2019-04-29 PROCEDURE — 25010000003 CEFAZOLIN IN DEXTROSE 2-4 GM/100ML-% SOLUTION: Performed by: NURSE PRACTITIONER

## 2019-04-29 PROCEDURE — A9270 NON-COVERED ITEM OR SERVICE: HCPCS | Performed by: NURSE PRACTITIONER

## 2019-04-29 PROCEDURE — C1776 JOINT DEVICE (IMPLANTABLE): HCPCS | Performed by: ORTHOPAEDIC SURGERY

## 2019-04-29 PROCEDURE — A9270 NON-COVERED ITEM OR SERVICE: HCPCS

## 2019-04-29 PROCEDURE — 63710000001 MUPIROCIN 2 % OINTMENT: Performed by: NURSE PRACTITIONER

## 2019-04-29 PROCEDURE — 25010000002 MIDAZOLAM PER 1 MG: Performed by: ANESTHESIOLOGY

## 2019-04-29 PROCEDURE — 63710000001 MELOXICAM 15 MG TABLET

## 2019-04-29 PROCEDURE — 25010000002 VANCOMYCIN PER 500 MG: Performed by: ORTHOPAEDIC SURGERY

## 2019-04-29 PROCEDURE — 94799 UNLISTED PULMONARY SVC/PX: CPT

## 2019-04-29 PROCEDURE — 25010000003 CEFAZOLIN IN DEXTROSE 2-4 GM/100ML-% SOLUTION: Performed by: ORTHOPAEDIC SURGERY

## 2019-04-29 PROCEDURE — 25010000002 PHENYLEPHRINE PER 1 ML: Performed by: NURSE ANESTHETIST, CERTIFIED REGISTERED

## 2019-04-29 PROCEDURE — 20985 CPTR-ASST DIR MS PX: CPT | Performed by: ORTHOPAEDIC SURGERY

## 2019-04-29 PROCEDURE — 27447 TOTAL KNEE ARTHROPLASTY: CPT | Performed by: ORTHOPAEDIC SURGERY

## 2019-04-29 PROCEDURE — A9270 NON-COVERED ITEM OR SERVICE: HCPCS | Performed by: ORTHOPAEDIC SURGERY

## 2019-04-29 PROCEDURE — 25010000002 PROPOFOL 10 MG/ML EMULSION: Performed by: NURSE ANESTHETIST, CERTIFIED REGISTERED

## 2019-04-29 PROCEDURE — 63710000001 HYDROCODONE-ACETAMINOPHEN 7.5-325 MG TABLET: Performed by: NURSE PRACTITIONER

## 2019-04-29 PROCEDURE — 63710000001 PREGABALIN 75 MG CAPSULE: Performed by: ORTHOPAEDIC SURGERY

## 2019-04-29 PROCEDURE — 73560 X-RAY EXAM OF KNEE 1 OR 2: CPT

## 2019-04-29 DEVICE — GENESIS II BICONVEX PATELLA 26MM
Type: IMPLANTABLE DEVICE | Site: KNEE | Status: FUNCTIONAL
Brand: GENESIS II

## 2019-04-29 DEVICE — PALACOS® R IS A FAST-CURING, RADIOPAQUE, POLY(METHYL METHACRYLATE)-BASED BONE CEMENT.PALACOS ® R CONTAINS THE X-RAY CONTRAST MEDIUM ZIRCONIUM DIOXIDE. TO IMPROVE VISIBILITY IN THE SURGICAL FIELD PALACOS ® R HAS BEEN COLOURED WITH CHLOROPHYLL (E141). THE BONE CEMENT IS PREPARED DIRECTLY BEFORE USE BY MIXING A POLYMER POWDER COMPONENT WITH A LIQUID MONOMER COMPONENT. A DUCTILE DOUGH FORMS WHICH CURES WITHIN A FEW MINUTES.
Type: IMPLANTABLE DEVICE | Site: KNEE | Status: FUNCTIONAL
Brand: PALACOS®

## 2019-04-29 DEVICE — IMPLANTABLE DEVICE: Type: IMPLANTABLE DEVICE | Site: KNEE | Status: FUNCTIONAL

## 2019-04-29 DEVICE — LEGION POSTERIOR STABILIZED                                    OXINIUM FEMORAL SIZE 4 RIGHT
Type: IMPLANTABLE DEVICE | Site: KNEE | Status: FUNCTIONAL
Brand: LEGION

## 2019-04-29 DEVICE — LEGION POSTERIOR STABILIZED HIGH                                    FLEX HIGHLY CROSS LINKED                                    POLYETHYLENE SIZE 3-4 11MM
Type: IMPLANTABLE DEVICE | Site: KNEE | Status: FUNCTIONAL
Brand: LEGION

## 2019-04-29 DEVICE — GENESIS II NON-POROUS TIBIAL                                    BASEPLATE SIZE 4 RIGHT
Type: IMPLANTABLE DEVICE | Site: KNEE | Status: FUNCTIONAL
Brand: GENESIS II

## 2019-04-29 RX ORDER — MIDAZOLAM HYDROCHLORIDE 1 MG/ML
1 INJECTION INTRAMUSCULAR; INTRAVENOUS
Status: DISCONTINUED | OUTPATIENT
Start: 2019-04-29 | End: 2019-04-29 | Stop reason: HOSPADM

## 2019-04-29 RX ORDER — CEFAZOLIN SODIUM 2 G/100ML
2 INJECTION, SOLUTION INTRAVENOUS EVERY 8 HOURS
Status: COMPLETED | OUTPATIENT
Start: 2019-04-29 | End: 2019-04-30

## 2019-04-29 RX ORDER — ONDANSETRON 2 MG/ML
4 INJECTION INTRAMUSCULAR; INTRAVENOUS EVERY 6 HOURS PRN
Status: DISCONTINUED | OUTPATIENT
Start: 2019-04-29 | End: 2019-04-30 | Stop reason: HOSPADM

## 2019-04-29 RX ORDER — ONDANSETRON 4 MG/1
4 TABLET, FILM COATED ORAL EVERY 6 HOURS PRN
Status: DISCONTINUED | OUTPATIENT
Start: 2019-04-29 | End: 2019-04-30 | Stop reason: HOSPADM

## 2019-04-29 RX ORDER — SODIUM CHLORIDE, SODIUM LACTATE, POTASSIUM CHLORIDE, CALCIUM CHLORIDE 600; 310; 30; 20 MG/100ML; MG/100ML; MG/100ML; MG/100ML
9 INJECTION, SOLUTION INTRAVENOUS CONTINUOUS
Status: DISCONTINUED | OUTPATIENT
Start: 2019-04-29 | End: 2019-04-29 | Stop reason: HOSPADM

## 2019-04-29 RX ORDER — ONDANSETRON 2 MG/ML
INJECTION INTRAMUSCULAR; INTRAVENOUS AS NEEDED
Status: DISCONTINUED | OUTPATIENT
Start: 2019-04-29 | End: 2019-04-29 | Stop reason: SURG

## 2019-04-29 RX ORDER — PSEUDOEPHEDRINE HCL 30 MG
100 TABLET ORAL 2 TIMES DAILY
Qty: 28 EACH | Refills: 0 | Status: SHIPPED | OUTPATIENT
Start: 2019-04-30 | End: 2019-05-14

## 2019-04-29 RX ORDER — HYDROCODONE BITARTRATE AND ACETAMINOPHEN 7.5; 325 MG/1; MG/1
TABLET ORAL
Qty: 84 TABLET | Refills: 0 | Status: SHIPPED | OUTPATIENT
Start: 2019-04-29 | End: 2019-09-12 | Stop reason: HOSPADM

## 2019-04-29 RX ORDER — FENTANYL CITRATE 50 UG/ML
50 INJECTION, SOLUTION INTRAMUSCULAR; INTRAVENOUS
Status: DISCONTINUED | OUTPATIENT
Start: 2019-04-29 | End: 2019-04-29 | Stop reason: HOSPADM

## 2019-04-29 RX ORDER — DEXAMETHASONE SODIUM PHOSPHATE 4 MG/ML
INJECTION, SOLUTION INTRA-ARTICULAR; INTRALESIONAL; INTRAMUSCULAR; INTRAVENOUS; SOFT TISSUE AS NEEDED
Status: DISCONTINUED | OUTPATIENT
Start: 2019-04-29 | End: 2019-04-29 | Stop reason: SURG

## 2019-04-29 RX ORDER — PREGABALIN 75 MG/1
150 CAPSULE ORAL ONCE
Status: COMPLETED | OUTPATIENT
Start: 2019-04-29 | End: 2019-04-29

## 2019-04-29 RX ORDER — PROPOFOL 10 MG/ML
VIAL (ML) INTRAVENOUS CONTINUOUS PRN
Status: DISCONTINUED | OUTPATIENT
Start: 2019-04-29 | End: 2019-04-29 | Stop reason: SURG

## 2019-04-29 RX ORDER — FAMOTIDINE 10 MG/ML
20 INJECTION, SOLUTION INTRAVENOUS ONCE
Status: COMPLETED | OUTPATIENT
Start: 2019-04-29 | End: 2019-04-29

## 2019-04-29 RX ORDER — FLUMAZENIL 0.1 MG/ML
0.2 INJECTION INTRAVENOUS AS NEEDED
Status: DISCONTINUED | OUTPATIENT
Start: 2019-04-29 | End: 2019-04-29 | Stop reason: HOSPADM

## 2019-04-29 RX ORDER — MELOXICAM 7.5 MG/1
15 TABLET ORAL ONCE
Status: COMPLETED | OUTPATIENT
Start: 2019-04-29 | End: 2019-04-29

## 2019-04-29 RX ORDER — DIPHENHYDRAMINE HCL 25 MG
25 CAPSULE ORAL
Status: DISCONTINUED | OUTPATIENT
Start: 2019-04-29 | End: 2019-04-29 | Stop reason: HOSPADM

## 2019-04-29 RX ORDER — ONDANSETRON 2 MG/ML
4 INJECTION INTRAMUSCULAR; INTRAVENOUS ONCE AS NEEDED
Status: COMPLETED | OUTPATIENT
Start: 2019-04-29 | End: 2019-04-29

## 2019-04-29 RX ORDER — DIPHENOXYLATE HYDROCHLORIDE AND ATROPINE SULFATE 2.5; .025 MG/1; MG/1
1 TABLET ORAL 4 TIMES DAILY PRN
Status: DISCONTINUED | OUTPATIENT
Start: 2019-04-29 | End: 2019-04-30 | Stop reason: HOSPADM

## 2019-04-29 RX ORDER — TRANEXAMIC ACID 100 MG/ML
INJECTION, SOLUTION INTRAVENOUS AS NEEDED
Status: DISCONTINUED | OUTPATIENT
Start: 2019-04-29 | End: 2019-04-29 | Stop reason: SURG

## 2019-04-29 RX ORDER — ASPIRIN 325 MG
325 TABLET, DELAYED RELEASE (ENTERIC COATED) ORAL EVERY 12 HOURS SCHEDULED
Status: DISCONTINUED | OUTPATIENT
Start: 2019-04-30 | End: 2019-04-30 | Stop reason: HOSPADM

## 2019-04-29 RX ORDER — EPHEDRINE SULFATE 50 MG/ML
5 INJECTION, SOLUTION INTRAVENOUS ONCE AS NEEDED
Status: DISCONTINUED | OUTPATIENT
Start: 2019-04-29 | End: 2019-04-29 | Stop reason: HOSPADM

## 2019-04-29 RX ORDER — CEFAZOLIN SODIUM 2 G/100ML
2 INJECTION, SOLUTION INTRAVENOUS ONCE
Status: COMPLETED | OUTPATIENT
Start: 2019-04-29 | End: 2019-04-29

## 2019-04-29 RX ORDER — HYDROCODONE BITARTRATE AND ACETAMINOPHEN 7.5; 325 MG/1; MG/1
2 TABLET ORAL EVERY 4 HOURS PRN
Status: DISCONTINUED | OUTPATIENT
Start: 2019-04-29 | End: 2019-04-30 | Stop reason: HOSPADM

## 2019-04-29 RX ORDER — MAGNESIUM HYDROXIDE 1200 MG/15ML
LIQUID ORAL AS NEEDED
Status: DISCONTINUED | OUTPATIENT
Start: 2019-04-29 | End: 2019-04-29 | Stop reason: HOSPADM

## 2019-04-29 RX ORDER — MIDAZOLAM HYDROCHLORIDE 1 MG/ML
2 INJECTION INTRAMUSCULAR; INTRAVENOUS
Status: DISCONTINUED | OUTPATIENT
Start: 2019-04-29 | End: 2019-04-29 | Stop reason: HOSPADM

## 2019-04-29 RX ORDER — ONDANSETRON 4 MG/1
4 TABLET, FILM COATED ORAL EVERY 6 HOURS PRN
Qty: 10 TABLET | Refills: 0 | Status: SHIPPED | OUTPATIENT
Start: 2019-04-29 | End: 2019-08-30

## 2019-04-29 RX ORDER — DOCUSATE SODIUM 100 MG/1
100 CAPSULE, LIQUID FILLED ORAL 2 TIMES DAILY
Status: DISCONTINUED | OUTPATIENT
Start: 2019-04-29 | End: 2019-04-30 | Stop reason: HOSPADM

## 2019-04-29 RX ORDER — BUPIVACAINE HYDROCHLORIDE 7.5 MG/ML
INJECTION, SOLUTION EPIDURAL; RETROBULBAR
Status: COMPLETED | OUTPATIENT
Start: 2019-04-29 | End: 2019-04-29

## 2019-04-29 RX ORDER — BISACODYL 10 MG
10 SUPPOSITORY, RECTAL RECTAL DAILY PRN
Status: DISCONTINUED | OUTPATIENT
Start: 2019-04-29 | End: 2019-04-30 | Stop reason: HOSPADM

## 2019-04-29 RX ORDER — HYDRALAZINE HYDROCHLORIDE 20 MG/ML
5 INJECTION INTRAMUSCULAR; INTRAVENOUS
Status: DISCONTINUED | OUTPATIENT
Start: 2019-04-29 | End: 2019-04-29 | Stop reason: HOSPADM

## 2019-04-29 RX ORDER — NALOXONE HCL 0.4 MG/ML
0.2 VIAL (ML) INJECTION AS NEEDED
Status: DISCONTINUED | OUTPATIENT
Start: 2019-04-29 | End: 2019-04-29 | Stop reason: HOSPADM

## 2019-04-29 RX ORDER — UREA 10 %
3 LOTION (ML) TOPICAL NIGHTLY PRN
Status: DISCONTINUED | OUTPATIENT
Start: 2019-04-29 | End: 2019-04-30 | Stop reason: HOSPADM

## 2019-04-29 RX ORDER — HYDROCODONE BITARTRATE AND ACETAMINOPHEN 7.5; 325 MG/1; MG/1
1 TABLET ORAL EVERY 4 HOURS PRN
Status: DISCONTINUED | OUTPATIENT
Start: 2019-04-29 | End: 2019-04-30 | Stop reason: HOSPADM

## 2019-04-29 RX ORDER — SODIUM CHLORIDE 0.9 % (FLUSH) 0.9 %
1-10 SYRINGE (ML) INJECTION AS NEEDED
Status: DISCONTINUED | OUTPATIENT
Start: 2019-04-29 | End: 2019-04-29 | Stop reason: HOSPADM

## 2019-04-29 RX ORDER — LABETALOL HYDROCHLORIDE 5 MG/ML
5 INJECTION, SOLUTION INTRAVENOUS
Status: DISCONTINUED | OUTPATIENT
Start: 2019-04-29 | End: 2019-04-29 | Stop reason: HOSPADM

## 2019-04-29 RX ORDER — ACETAMINOPHEN 10 MG/ML
1000 INJECTION, SOLUTION INTRAVENOUS ONCE
Status: COMPLETED | OUTPATIENT
Start: 2019-04-29 | End: 2019-04-29

## 2019-04-29 RX ORDER — ACETAMINOPHEN 325 MG/1
650 TABLET ORAL ONCE AS NEEDED
Status: DISCONTINUED | OUTPATIENT
Start: 2019-04-29 | End: 2019-04-29 | Stop reason: HOSPADM

## 2019-04-29 RX ORDER — PANTOPRAZOLE SODIUM 40 MG/1
40 TABLET, DELAYED RELEASE ORAL EVERY MORNING
Status: DISCONTINUED | OUTPATIENT
Start: 2019-04-30 | End: 2019-04-30 | Stop reason: HOSPADM

## 2019-04-29 RX ORDER — LIDOCAINE HYDROCHLORIDE 10 MG/ML
0.5 INJECTION, SOLUTION EPIDURAL; INFILTRATION; INTRACAUDAL; PERINEURAL ONCE AS NEEDED
Status: DISCONTINUED | OUTPATIENT
Start: 2019-04-29 | End: 2019-04-29 | Stop reason: HOSPADM

## 2019-04-29 RX ORDER — VANCOMYCIN HYDROCHLORIDE 1 G/200ML
15 INJECTION, SOLUTION INTRAVENOUS ONCE
Status: COMPLETED | OUTPATIENT
Start: 2019-04-29 | End: 2019-04-29

## 2019-04-29 RX ORDER — MELOXICAM 15 MG/1
TABLET ORAL
Status: COMPLETED
Start: 2019-04-29 | End: 2019-04-29

## 2019-04-29 RX ADMIN — TRANEXAMIC ACID 1000 MG: 100 INJECTION, SOLUTION INTRAVENOUS at 14:12

## 2019-04-29 RX ADMIN — MELOXICAM 15 MG: 7.5 TABLET ORAL at 11:01

## 2019-04-29 RX ADMIN — SODIUM CHLORIDE, POTASSIUM CHLORIDE, SODIUM LACTATE AND CALCIUM CHLORIDE 500 ML: 600; 310; 30; 20 INJECTION, SOLUTION INTRAVENOUS at 11:02

## 2019-04-29 RX ADMIN — ONDANSETRON 4 MG: 2 INJECTION INTRAMUSCULAR; INTRAVENOUS at 14:32

## 2019-04-29 RX ADMIN — DEXAMETHASONE SODIUM PHOSPHATE 8 MG: 4 INJECTION, SOLUTION INTRAMUSCULAR; INTRAVENOUS at 12:55

## 2019-04-29 RX ADMIN — MELOXICAM 15 MG: 15 TABLET ORAL at 11:01

## 2019-04-29 RX ADMIN — MUPIROCIN 1 APPLICATION: 20 OINTMENT TOPICAL at 20:41

## 2019-04-29 RX ADMIN — PHENYLEPHRINE HYDROCHLORIDE 200 MCG: 10 INJECTION INTRAVENOUS at 13:28

## 2019-04-29 RX ADMIN — MIDAZOLAM 1 MG: 1 INJECTION INTRAMUSCULAR; INTRAVENOUS at 11:55

## 2019-04-29 RX ADMIN — ACETAMINOPHEN 1000 MG: 10 INJECTION, SOLUTION INTRAVENOUS at 12:49

## 2019-04-29 RX ADMIN — IPRATROPIUM BROMIDE 0.5 MG: 0.5 SOLUTION RESPIRATORY (INHALATION) at 11:44

## 2019-04-29 RX ADMIN — HYDROCODONE BITARTRATE AND ACETAMINOPHEN 1 TABLET: 7.5; 325 TABLET ORAL at 20:35

## 2019-04-29 RX ADMIN — BUPIVACAINE HYDROCHLORIDE 1.3 ML: 7.5 INJECTION, SOLUTION EPIDURAL; RETROBULBAR at 12:44

## 2019-04-29 RX ADMIN — PHENYLEPHRINE HYDROCHLORIDE 200 MCG: 10 INJECTION INTRAVENOUS at 13:46

## 2019-04-29 RX ADMIN — CEFAZOLIN SODIUM 2 G: 2 INJECTION, SOLUTION INTRAVENOUS at 20:35

## 2019-04-29 RX ADMIN — FAMOTIDINE 20 MG: 10 INJECTION INTRAVENOUS at 11:55

## 2019-04-29 RX ADMIN — PREGABALIN 150 MG: 75 CAPSULE ORAL at 11:01

## 2019-04-29 RX ADMIN — PHENYLEPHRINE HYDROCHLORIDE 100 MCG: 10 INJECTION INTRAVENOUS at 14:15

## 2019-04-29 RX ADMIN — PHENYLEPHRINE HYDROCHLORIDE 100 MCG: 10 INJECTION INTRAVENOUS at 13:35

## 2019-04-29 RX ADMIN — PHENYLEPHRINE HYDROCHLORIDE 200 MCG: 10 INJECTION INTRAVENOUS at 13:56

## 2019-04-29 RX ADMIN — ONDANSETRON 4 MG: 2 INJECTION INTRAMUSCULAR; INTRAVENOUS at 20:34

## 2019-04-29 RX ADMIN — VANCOMYCIN HYDROCHLORIDE 1000 MG: 1 INJECTION, SOLUTION INTRAVENOUS at 11:04

## 2019-04-29 RX ADMIN — SODIUM CHLORIDE, POTASSIUM CHLORIDE, SODIUM LACTATE AND CALCIUM CHLORIDE 9 ML/HR: 600; 310; 30; 20 INJECTION, SOLUTION INTRAVENOUS at 11:02

## 2019-04-29 RX ADMIN — ONDANSETRON 4 MG: 2 INJECTION INTRAMUSCULAR; INTRAVENOUS at 15:00

## 2019-04-29 RX ADMIN — SODIUM CHLORIDE, POTASSIUM CHLORIDE, SODIUM LACTATE AND CALCIUM CHLORIDE: 600; 310; 30; 20 INJECTION, SOLUTION INTRAVENOUS at 13:39

## 2019-04-29 RX ADMIN — PROPOFOL 50 MCG/KG/MIN: 10 INJECTION, EMULSION INTRAVENOUS at 12:53

## 2019-04-29 RX ADMIN — CEFAZOLIN SODIUM 2 G: 2 INJECTION, SOLUTION INTRAVENOUS at 12:46

## 2019-04-29 NOTE — ANESTHESIA PROCEDURE NOTES
Spinal Block    Pre-sedation assessment completed: 4/29/2019 12:40 PM    Patient reassessed immediately prior to procedure    Patient location during procedure: OR  Start Time: 4/29/2019 12:40 PM  Stop Time: 4/29/2019 12:42 PM  Indication:at surgeon's request  Performed By  Anesthesiologist: Anthony Butler MD  Preanesthetic Checklist  Completed: patient identified, surgical consent, pre-op evaluation, timeout performed, IV checked, risks and benefits discussed and monitors and equipment checked  Spinal Block Prep:  Patient Position:sitting  Sterile Tech:cap, gloves, gown, mask and sterile barriers  Prep:Betadine  Patient Monitoring:blood pressure monitoring, continuous pulse oximetry and EKG  Spinal Block Procedure  Approach:midline  Guidance:palpation technique  Location:L4-L5  Needle Type:Sprotte  Needle Gauge:24 G  Placement of Spinal needle event:cerebrospinal fluid aspirated  Paresthesia: no  Fluid Appearance:clear  Medications: bupivacaine PF (MARCAINE) 0.75 % injection, 1.3 mL   Post Assessment  Patient Tolerance:patient tolerated the procedure well with no apparent complications  Complications no

## 2019-04-29 NOTE — ANESTHESIA PREPROCEDURE EVALUATION
Anesthesia Evaluation     Patient summary reviewed and Nursing notes reviewed   NPO Solid Status: > 8 hours  NPO Liquid Status: > 2 hours           Airway   Dental      Pulmonary - normal exam    breath sounds clear to auscultation  (+) a smoker Current Abstained day of surgery, wheezes,   Cardiovascular - negative cardio ROS and normal exam    Rhythm: regular  Rate: normal        Neuro/Psych- negative ROS  GI/Hepatic/Renal/Endo    (+)  GERD,      Musculoskeletal     Abdominal    Substance History - negative use     OB/GYN          Other   (+) arthritis                     Anesthesia Plan    ASA 3     spinal     Anesthetic plan, all risks, benefits, and alternatives have been provided, discussed and informed consent has been obtained with: patient.

## 2019-04-29 NOTE — ANESTHESIA POSTPROCEDURE EVALUATION
Patient: Trice Melara    Procedure Summary     Date:  04/29/19 Room / Location:  [facility] OR [address] [place] [address] MAIN OR    Anesthesia Start:  1239 Anesthesia Stop:  1450    Procedure:  RT TOTAL KNEE ARTHROPLASTY WITH NAVIGATION (Right Knee) Diagnosis:       Primary osteoarthritis of right knee      (Primary osteoarthritis of right knee [M17.11])    Surgeon:  Bolivar Glover MD Provider:  Anthony Butler MD    Anesthesia Type:  spinal ASA Status:  3          Anesthesia Type: spinal  Last vitals  BP   (!) 131/105 (04/29/19 1044)   Temp   36.6 °C (97.8 °F) (04/29/19 1044)   Pulse   97 (04/29/19 1200)   Resp   18 (04/29/19 1147)     SpO2   94 %(Post sedation) (04/29/19 1200)     Post Anesthesia Care and Evaluation    Patient location during evaluation: PACU  Patient participation: complete - patient participated  Level of consciousness: awake and alert  Pain management: adequate  Airway patency: patent  Anesthetic complications: No anesthetic complications    Cardiovascular status: acceptable  Respiratory status: acceptable  Hydration status: acceptable    Comments: --------------------            04/29/19               1200     --------------------   BP:                  Pulse:      97       Resp:                Temp:                SpO2:      94%      --------------------

## 2019-04-30 VITALS
DIASTOLIC BLOOD PRESSURE: 72 MMHG | OXYGEN SATURATION: 92 % | WEIGHT: 124.9 LBS | SYSTOLIC BLOOD PRESSURE: 116 MMHG | RESPIRATION RATE: 16 BRPM | BODY MASS INDEX: 22.98 KG/M2 | HEIGHT: 62 IN | TEMPERATURE: 97 F | HEART RATE: 94 BPM

## 2019-04-30 LAB
HCT VFR BLD AUTO: 37.4 % (ref 34–46.6)
HGB BLD-MCNC: 11.6 G/DL (ref 12–15.9)

## 2019-04-30 PROCEDURE — A9270 NON-COVERED ITEM OR SERVICE: HCPCS | Performed by: NURSE PRACTITIONER

## 2019-04-30 PROCEDURE — 63710000001 PANTOPRAZOLE 40 MG TABLET DELAYED-RELEASE: Performed by: NURSE PRACTITIONER

## 2019-04-30 PROCEDURE — 63710000001 DOCUSATE SODIUM 100 MG CAPSULE: Performed by: NURSE PRACTITIONER

## 2019-04-30 PROCEDURE — 63710000001 ONDANSETRON PER 8 MG: Performed by: NURSE PRACTITIONER

## 2019-04-30 PROCEDURE — 63710000001 ASPIRIN EC 325 MG TABLET DELAYED-RELEASE: Performed by: NURSE PRACTITIONER

## 2019-04-30 PROCEDURE — 85014 HEMATOCRIT: CPT | Performed by: NURSE PRACTITIONER

## 2019-04-30 PROCEDURE — 85018 HEMOGLOBIN: CPT | Performed by: NURSE PRACTITIONER

## 2019-04-30 PROCEDURE — 97161 PT EVAL LOW COMPLEX 20 MIN: CPT

## 2019-04-30 PROCEDURE — 97110 THERAPEUTIC EXERCISES: CPT

## 2019-04-30 PROCEDURE — 63710000001 HYDROCODONE-ACETAMINOPHEN 7.5-325 MG TABLET: Performed by: NURSE PRACTITIONER

## 2019-04-30 PROCEDURE — 25010000002 ONDANSETRON PER 1 MG: Performed by: NURSE PRACTITIONER

## 2019-04-30 PROCEDURE — 63710000001 POLYETHYLENE GLYCOL PACK: Performed by: NURSE PRACTITIONER

## 2019-04-30 PROCEDURE — 63710000001 MUPIROCIN 2 % OINTMENT: Performed by: NURSE PRACTITIONER

## 2019-04-30 PROCEDURE — 25010000003 CEFAZOLIN IN DEXTROSE 2-4 GM/100ML-% SOLUTION: Performed by: NURSE PRACTITIONER

## 2019-04-30 RX ADMIN — POLYETHYLENE GLYCOL 3350 17 G: 17 POWDER, FOR SOLUTION ORAL at 10:41

## 2019-04-30 RX ADMIN — PANTOPRAZOLE SODIUM 40 MG: 40 TABLET, DELAYED RELEASE ORAL at 06:01

## 2019-04-30 RX ADMIN — CEFAZOLIN SODIUM 2 G: 2 INJECTION, SOLUTION INTRAVENOUS at 05:16

## 2019-04-30 RX ADMIN — ONDANSETRON 4 MG: 2 INJECTION INTRAMUSCULAR; INTRAVENOUS at 02:35

## 2019-04-30 RX ADMIN — ASPIRIN 325 MG: 325 TABLET, DELAYED RELEASE ORAL at 10:42

## 2019-04-30 RX ADMIN — ONDANSETRON 4 MG: 4 TABLET, FILM COATED ORAL at 08:34

## 2019-04-30 RX ADMIN — HYDROCODONE BITARTRATE AND ACETAMINOPHEN 1 TABLET: 7.5; 325 TABLET ORAL at 02:35

## 2019-04-30 RX ADMIN — DOCUSATE SODIUM 100 MG: 100 CAPSULE, LIQUID FILLED ORAL at 10:41

## 2019-04-30 RX ADMIN — MUPIROCIN 1 APPLICATION: 20 OINTMENT TOPICAL at 10:41

## 2019-04-30 RX ADMIN — ONDANSETRON 4 MG: 2 INJECTION INTRAMUSCULAR; INTRAVENOUS at 08:39

## 2019-05-06 ENCOUNTER — TELEPHONE (OUTPATIENT)
Dept: ORTHOPEDIC SURGERY | Facility: CLINIC | Age: 79
End: 2019-05-06

## 2019-05-08 ENCOUNTER — TELEPHONE (OUTPATIENT)
Dept: ORTHOPEDIC SURGERY | Facility: CLINIC | Age: 79
End: 2019-05-08

## 2019-05-08 ENCOUNTER — HOSPITAL ENCOUNTER (OUTPATIENT)
Dept: CARDIOLOGY | Facility: HOSPITAL | Age: 79
Discharge: HOME | End: 2019-05-08
Admitting: ORTHOPAEDIC SURGERY

## 2019-05-08 DIAGNOSIS — M79.661 PAIN AND SWELLING OF RIGHT LOWER LEG: ICD-10-CM

## 2019-05-08 DIAGNOSIS — M79.89 PAIN AND SWELLING OF RIGHT LOWER LEG: ICD-10-CM

## 2019-05-08 DIAGNOSIS — M79.89 PAIN AND SWELLING OF RIGHT LOWER LEG: Primary | ICD-10-CM

## 2019-05-08 DIAGNOSIS — M79.661 PAIN AND SWELLING OF RIGHT LOWER LEG: Primary | ICD-10-CM

## 2019-05-08 LAB
BH CV LOWER VASCULAR LEFT COMMON FEMORAL AUGMENT: NORMAL
BH CV LOWER VASCULAR LEFT COMMON FEMORAL COMPETENT: NORMAL
BH CV LOWER VASCULAR LEFT COMMON FEMORAL COMPRESS: NORMAL
BH CV LOWER VASCULAR LEFT COMMON FEMORAL PHASIC: NORMAL
BH CV LOWER VASCULAR LEFT COMMON FEMORAL SPONT: NORMAL
BH CV LOWER VASCULAR RIGHT COMMON FEMORAL AUGMENT: NORMAL
BH CV LOWER VASCULAR RIGHT COMMON FEMORAL COMPETENT: NORMAL
BH CV LOWER VASCULAR RIGHT COMMON FEMORAL COMPRESS: NORMAL
BH CV LOWER VASCULAR RIGHT COMMON FEMORAL PHASIC: NORMAL
BH CV LOWER VASCULAR RIGHT COMMON FEMORAL SPONT: NORMAL
BH CV LOWER VASCULAR RIGHT DISTAL FEMORAL COMPRESS: NORMAL
BH CV LOWER VASCULAR RIGHT GASTRONEMIUS COMPRESS: NORMAL
BH CV LOWER VASCULAR RIGHT GREATER SAPH AK COMPRESS: NORMAL
BH CV LOWER VASCULAR RIGHT GREATER SAPH BK COMPRESS: NORMAL
BH CV LOWER VASCULAR RIGHT LESSER SAPH COMPRESS: NORMAL
BH CV LOWER VASCULAR RIGHT MID FEMORAL AUGMENT: NORMAL
BH CV LOWER VASCULAR RIGHT MID FEMORAL COMPETENT: NORMAL
BH CV LOWER VASCULAR RIGHT MID FEMORAL COMPRESS: NORMAL
BH CV LOWER VASCULAR RIGHT MID FEMORAL PHASIC: NORMAL
BH CV LOWER VASCULAR RIGHT MID FEMORAL SPONT: NORMAL
BH CV LOWER VASCULAR RIGHT PERONEAL COMPRESS: NORMAL
BH CV LOWER VASCULAR RIGHT POPLITEAL AUGMENT: NORMAL
BH CV LOWER VASCULAR RIGHT POPLITEAL COMPETENT: NORMAL
BH CV LOWER VASCULAR RIGHT POPLITEAL COMPRESS: NORMAL
BH CV LOWER VASCULAR RIGHT POPLITEAL PHASIC: NORMAL
BH CV LOWER VASCULAR RIGHT POPLITEAL SPONT: NORMAL
BH CV LOWER VASCULAR RIGHT POSTERIOR TIBIAL COMPRESS: NORMAL
BH CV LOWER VASCULAR RIGHT PROXIMAL FEMORAL COMPRESS: NORMAL
BH CV LOWER VASCULAR RIGHT SAPHENOFEMORAL JUNCTION AUGMENT: NORMAL
BH CV LOWER VASCULAR RIGHT SAPHENOFEMORAL JUNCTION COMPETENT: NORMAL
BH CV LOWER VASCULAR RIGHT SAPHENOFEMORAL JUNCTION COMPRESS: NORMAL
BH CV LOWER VASCULAR RIGHT SAPHENOFEMORAL JUNCTION PHASIC: NORMAL
BH CV LOWER VASCULAR RIGHT SAPHENOFEMORAL JUNCTION SPONT: NORMAL

## 2019-05-08 PROCEDURE — 93971 EXTREMITY STUDY: CPT

## 2019-05-08 NOTE — TELEPHONE ENCOUNTER
Regarding: Visit Follow-Up Question  Contact: [phone]  ----- Message from Mychart, Generic sent at 5/8/2019  4:12 PM EDT -----  MY CHART MESSAGE:    Giulia, would you please give me a call tomorrow, home or cell.  I will be home and need to tell you about todays visit to [facility].  No hurry thanks,   Trice hancock 1940

## 2019-05-08 NOTE — TELEPHONE ENCOUNTER
Regarding: Visit Follow-Up Question  Contact: [phone]  ----- Message from GoPoll[employer], Generic sent at 5/8/2019 11:07 AM EDT -----    For Dr. Glover or one of his assistants.  My right leg is swollen below the knee surgery.  It is not hot or itchy, should I be worried.  I have kept ice on the knee and kept the knee up. I can be reached at [phone] or [phone] Thank You

## 2019-05-10 ENCOUNTER — TELEPHONE (OUTPATIENT)
Dept: ORTHOPEDIC SURGERY | Facility: CLINIC | Age: 79
End: 2019-05-10

## 2019-05-10 DIAGNOSIS — Z96.651 STATUS POST TOTAL KNEE REPLACEMENT, RIGHT: Primary | ICD-10-CM

## 2019-05-10 NOTE — TELEPHONE ENCOUNTER
Patient had Right TKA 4/29/19 by [facility]    Patient is scheduled for OP PT this Mon 5/13/19 at 10:15 AM at [address].  Reena needs OP PT order placed in Epic.     Patient is scheduled 5/21 with [facility] for PO / 2WKS RT KNEE SX DATE 04/29. Thanks /srh

## 2019-05-13 ENCOUNTER — APPOINTMENT (OUTPATIENT)
Dept: PHYSICAL THERAPY | Facility: HOSPITAL | Age: 79
End: 2019-05-13
Attending: ORTHOPAEDIC SURGERY

## 2019-05-16 ENCOUNTER — TELEPHONE (OUTPATIENT)
Dept: ORTHOPEDIC SURGERY | Facility: CLINIC | Age: 79
End: 2019-05-16

## 2019-05-16 ENCOUNTER — HOSPITAL ENCOUNTER (OUTPATIENT)
Dept: PHYSICAL THERAPY | Facility: HOSPITAL | Age: 79
Setting detail: THERAPIES SERIES
Discharge: HOME | End: 2019-05-16
Attending: ORTHOPAEDIC SURGERY

## 2019-05-16 DIAGNOSIS — R29.898 DECREASED STRENGTH INVOLVING KNEE JOINT: ICD-10-CM

## 2019-05-16 DIAGNOSIS — M25.661 DECREASED RANGE OF MOTION (ROM) OF RIGHT KNEE: ICD-10-CM

## 2019-05-16 DIAGNOSIS — M25.561 ACUTE PAIN OF RIGHT KNEE: ICD-10-CM

## 2019-05-16 DIAGNOSIS — Z96.651 S/P TKR (TOTAL KNEE REPLACEMENT), RIGHT: Primary | ICD-10-CM

## 2019-05-16 PROCEDURE — 97110 THERAPEUTIC EXERCISES: CPT | Performed by: PHYSICAL THERAPIST

## 2019-05-16 PROCEDURE — 97162 PT EVAL MOD COMPLEX 30 MIN: CPT | Performed by: PHYSICAL THERAPIST

## 2019-05-16 NOTE — TELEPHONE ENCOUNTER
Regarding: Non-Urgent Medical Question  Contact: [phone]  ----- Message from Vistaar, Generic sent at 5/16/2019  8:09 AM EDT -----    Can I start wearing my support hose? My legs feel better with them on, or do i need to wait to see Dr. Glover next week.   Thank you, Trice Melara

## 2019-05-17 NOTE — THERAPY EVALUATION
.Outpatient Physical Therapy Neuro Initial Evaluation  [facility]     Patient Name: Trice Melara  : 1940  MRN: [id number]  Today's Date: 2019      Visit Date: 2019    Patient Active Problem List   Diagnosis   • Gastroesophageal reflux disease   • Knee pain   • Iron deficiency anemia   • Abnormal kidney function   • Acute renal failure (CMS/HCC)   • Arthritis of knee   • Arthritis   • Osteoarthrosis, localized, primary, knee   • Primary osteoarthritis of knees, bilateral   • Closed displaced intertrochanteric fracture of right femur (CMS/HCC)   • Fall   • Osteoporosis   • Primary osteoarthritis of right knee   • OA (osteoarthritis) of knee        Past Medical History:   Diagnosis Date   • Abdominal wall dehiscence 2016    Fascial Dehiscence   • Acute renal failure (CMS/HCC)    • Aspiration pneumonia (CMS/HCC)    • Colovaginal fistula    • Diverticulitis     with paracolonic abscess and colovesical fistula,    • Gastric ulcer    • GERD (gastroesophageal reflux disease)    • H/O Bowel perforation    • History of diverticular abscess    • History of GI bleed    • Osteoarthritis    • Peritonitis (CMS/HCC) 2016   • Septic shock (CMS/HCC)    • Small bowel anastomotic leak 2016        Past Surgical History:   Procedure Laterality Date   • BREAST CYST EXCISION     • COLON RESECTION  2016    Smal Bowel Resection -Dr. Syd Maharaj   • COLON SURGERY Left     with closure of fistula   • EXPLORATORY LAPAROTOMY  2015    With Extensive Adhesiolysis of over 1 hour to allow conduct of the operation and repair of small intestine-Dr. Syd Maharaj   • EXPLORATORY LAPAROTOMY  2016    Dr. Syd Maharaj   • INCISIONAL HERNIA REPAIR  2016    Incisional Hernia Repair with Fascial Dehiscence Repair with Mesh and Bilateral Component Separation-Dr. Syd Maharaj   • [place] OPEN FIX INTER/SUBTROCH FX,IMPLNT Right 2017    Procedure: FEMUR  INTRAMEDULLARY NAILING/RODDING;  Surgeon: Huan Weiss MD;  Location: [facility];  Service: Orthopedics   • TOTAL ABDOMINAL HYSTERECTOMY  1970s   • TOTAL KNEE ARTHROPLASTY Right 4/29/2019    Procedure: RT TOTAL KNEE ARTHROPLASTY WITH NAVIGATION;  Surgeon: Bolivar Glover MD;  Location: [facility];  Service: Orthopedics         Visit Dx:     ICD-10-CM ICD-9-CM   1. S/P TKR (total knee replacement), right Z96.651 V43.65   2. Acute pain of right knee M25.561 719.46   3. Decreased range of motion (ROM) of right knee M25.661 719.56   4. Decreased strength involving knee joint R29.898 729.89       Patient History     Row Name 05/16/19 1500             History    Chief Complaint  Pain  -LC      Type of Pain  Knee pain  -      Date Current Problem(s) Began  04/29/19  -      Brief Description of Current Complaint  Pt had R TKR on 4/29/19. Her R knee pain is very under control at this time. She reports walking with SC most of the time, but uses RW with longer distances. Pt has genu varus and internal rotation of LLE and forward flexed posture during ambulation. She has moderate edema in R knee with some blancheable erythema. She reports no numbness and tingling or sharp pain in R knee.   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  hyrdocodone, ice  -         Pain     Pain Location  Knee  -      Pain at Present  1  -LC      Pain at Best  1  -LC      Pain at Worst  6  -LC      Pain Frequency  Constant/continuous  -      Pain Description  Aching;Sore  -      What  Performance Factors Make the Current Problem(s) WORSE?  prolonged standing, increased flexion,  -LC      Is your sleep disturbed?  No  -LC      Difficulties with ADL's?  ascending/descending stairs, sit to stand, prolonged walking  -LC         Fall Risk Assessment    Any falls in the past year:  No  -LC         Services    Prior Rehab/Home Health Experiences  Yes  -LC      Are you currently receiving Home Health services  No  -LC         Daily Activities    Primary Language  English  -LC      Pt Participated in POC and Goals  Yes  -LC         Safety    Are you being hurt, hit, or frightened by anyone at home or in your life?  No  -LC      Are you being neglected by a caregiver  No  -LC        User Key  (r) = Recorded By, (t) = Taken By, (c) = Cosigned By    Initials Name Provider Type    LC Lonnie Adler, PT DPT Physical Therapist            PT Ortho     Row Name 05/16/19 1600       Posture/Observations    Genu varus  Left:;Moderate  -LC    Observations  Edema;Erythema;Incision healing  -LC    Posture/Observations Comments  ambulation with RW, forward flexion, good toe clearance, steps slightly shortened  -LC       Right Lower Ext    Rt Knee Extension/Flexion AROM  7 to 107 supine  -LC       Left Lower Ext    Lt Knee Extension/Flexion AROM  3 to 110  -LC       MMT (Manual Muscle Testing)    Rt Lower Ext  Rt Hip Flexion;Rt Hip Extension;Rt Hip ABduction;Rt Hip ADduction;Rt Knee Extension;Rt Knee Flexion  -LC    Lt Lower Ext  Lt Hip Flexion;Lt Hip Extension;Lt Hip ABduction;Lt Hip ADduction;Lt Knee Extension;Lt Knee Flexion  -LC       MMT Right Lower Ext    Rt Hip Flexion MMT, Gross Movement  (3+/5) fair plus  -LC    Rt Hip Extension MMT, Gross Movement  (3+/5) fair plus  -LC    Rt Hip ABduction MMT, Gross Movement  (3+/5) fair plus  -LC    Rt Hip ADduction MMT, Gross Movement  (3+/5) fair plus  -LC    Rt Knee Extension MMT, Gross Movement  (3+/5) fair plus  -LC    Rt Knee Flexion MMT, Gross Movement  (3+/5) fair  plus  -LC       MMT Left Lower Ext    Lt Hip Flexion MMT, Gross Movement  (4/5) good  -LC    Lt Hip Extension MMT, Gross Movement  (4/5) good  -LC    Lt Hip ABduction MMT, Gross Movement  (4-/5) good minus  -LC    Lt Hip ADduction MMT, Gross Movement  (4/5) good  -LC    Lt Knee Extension MMT, Gross Movement  (4/5) good  -LC    Lt Knee Flexion MMT, Gross Movement  (4/5) good  -LC      User Key  (r) = Recorded By, (t) = Taken By, (c) = Cosigned By    Initials Name Provider Type    Lonnie Vázquez, PT DPT Physical Therapist                    Therapy Education  Given: HEP, Symptoms/condition management, Pain management  Program: New  How Provided: Verbal, Demonstration, Written  Provided to: Patient  Level of Understanding: Demonstrated, Verbalized, Teach back education performed    PT OP Goals     Row Name 05/16/19 1600          PT Short Term Goals    STG 1  Pt will be independent with HEP to improve R knee ArOM and strength to allow full return to ADL's and ambulation without impairment.  -     STG 2  Pt will have R knee AROM 0 to 120.  -     STG 3  Pt will have R knee Mmt grossly 4/5  -        Long Term Goals    LTG 1  Pt will ambulate with SC or no A.D with reciprocal gait with heel to toe pattern.  -     LTG 2  Pt will have KOS > 75%  -     LTG 3  Pt will perform 5 sit to stand with no use of BUE.  -        Time Calculation    PT Goal Re-Cert Due Date  08/16/19  -       User Key  (r) = Recorded By, (t) = Taken By, (c) = Cosigned By    Initials Name Provider Type    Lonnie Vázquez, PT DPT Physical Therapist          PT Assessment/Plan     Row Name 05/16/19 1716          PT Assessment    Functional Limitations  Impaired gait;Limitation in home management;Limitations in community activities;Limitations in functional capacity and performance;Performance in leisure activities;Performance in self-care ADL  -     Impairments  Gait;Impaired flexibility;Joint integrity;Joint mobility;Muscle  strength;Pain;Range of motion  -     Assessment Comments  Pt is 78 y.o. female who presents to PT s/p RIGHT TKR on 4/29/19. She has completed HH therapy and reports her pain is under control and feels that her mobility and strength is improving. She has dressing over incision. Mild edema and blancheable erythema around R knee. She tolerated all skilled intervention well today. Demonstrates R knee AROM with good quality movement but limited in flex and ext. PT has R knee MMT grossly 3-/5. She is ambulating with RW and mild antalgic gait pattern. Pt issued written copy of HEP. Her status is currently evolving.   -     Please refer to paper survey for additional self-reported information  Yes  -LC     Rehab Potential  Excellent  -     Patient/caregiver participated in establishment of treatment plan and goals  Yes  -     Patient would benefit from skilled therapy intervention  Yes  -LC        PT Plan    PT Frequency  2x/week  -     Predicted Duration of Therapy Intervention (Therapy Eval)  6 to 8 weeks  -     Planned CPT's?  PT EVAL MOD COMPLELITY: 73962;PT RE-EVAL: 37046;PT THER ACT EA 15 MIN: 38686;PT THER PROC EA 15 MIN: 57348;PT MANUAL THERAPY EA 15 MIN: 62940;PT NEUROMUSC RE-EDUCATION EA 15 MIN: 72915;PT GAIT TRAINING EA 15 MIN: 01892;PT ULTRASOUND EA 15 MIN: 87825;PT ELECTRICAL STIM ATTD EA 15 MIN: 70398;PT HOT OR COLD PACK TREAT MCARE;PT ELECTRICAL STIM UNATTEND:   -     Physical Therapy Interventions (Optional Details)  gait training;balance training;home exercise program;modalities;manual therapy techniques;neuromuscular re-education;ROM (Range of Motion);stair training;patient/family education;strengthening;stretching  -     PT Plan Comments  Pt requires skilled therapy to improve R knee AROM and strength to allow full return to ambulation and ADL's with no limitations.  -       User Key  (r) = Recorded By, (t) = Taken By, (c) = Cosigned By    Initials Name Provider Type    KOREY Adler  Lonnie LONG, PT DPT Physical Therapist             Exercises     Row Name 05/16/19 1600             Total Minutes    43397 - PT Therapeutic Exercise Minutes  25  -LC         Exercise 1    Exercise Name 1  heel slide with towel  -LC      Sets 1  2  -LC      Reps 1  5  -LC      Time 1  3  -LC         Exercise 2    Exercise Name 2  gastroc stretch with towel  -LC      Sets 2  2  -LC      Reps 2  5  -LC      Time 2  3  -LC         Exercise 3    Exercise Name 3  SAQ  -LC      Sets 3  3  -LC      Reps 3  6  -LC      Time 3  2  -LC         Exercise 4    Exercise Name 4  hook hip ABD blue  -LC      Sets 4  3  -LC      Reps 4  6  -LC         Exercise 5    Exercise Name 5  hookhip ADD  -LC      Sets 5  3  -LC      Reps 5  6  -LC      Time 5  3  -LC        User Key  (r) = Recorded By, (t) = Taken By, (c) = Cosigned By    Initials Name Provider Type    Lonnie Vázquez, PT DPT Physical Therapist                      Outcome Measure Options: Knee Outcome Score- ADL  Knee Outcome Score  Knee Outcome Score Comments: 63%    Time Calculation:   Start Time: 1550  Stop Time: 1630  Time Calculation (min): 40 min  Total Timed Code Minutes- PT: 40 minute(s)   Therapy Charges for Today     Code Description Service Date Service Provider Modifiers Qty    42595592649 HC PT THER PROC EA 15 MIN 5/16/2019 Lonnie Adler, PT DPT GP 2    [id number]  PT EVAL MOD COMPLEXITY 1 5/16/2019 Lonnie Adler, PT DPT GP 1          PT G-Codes  Outcome Measure Options: Knee Outcome Score- ADL         OLIVIER BarrettT  5/17/2019

## 2019-05-21 ENCOUNTER — OFFICE VISIT (OUTPATIENT)
Dept: ORTHOPEDIC SURGERY | Facility: CLINIC | Age: 79
End: 2019-05-21

## 2019-05-21 VITALS — TEMPERATURE: 98 F

## 2019-05-21 DIAGNOSIS — Z96.651 STATUS POST RIGHT KNEE REPLACEMENT: Primary | ICD-10-CM

## 2019-05-21 PROCEDURE — 99024 POSTOP FOLLOW-UP VISIT: CPT | Performed by: ORTHOPAEDIC SURGERY

## 2019-05-21 NOTE — PROGRESS NOTES
Trice Melara : 1940 MRN: [id number] DATE: 2019    DIAGNOSIS: 2 week follow up right total knee      SUBJECTIVE:Patient returns today for 2 week follow up of right total knee replacement. Patient reports doing well with no unusual complaints. Appears to be progressing appropriately.    OBJECTIVE:   Exam:. The incision is healing appropriately. No sign of infection. Range of motion is progressing as expected. The calf is soft and nontender with a negative Homans sign.    ASSESSMENT: 2 week status post right knee replacement.    PLAN: 1) Staples removed and steri strips applied   2) Order given for PT   3) Discontinue KENJI hose   4) Continue ice PRN   5) aspirin 325 mg orally every day for 1 month   6) Follow up in 6 weeks with repeat Xrays of right knee (3views)    Has taken 47 norco to date  ROM 5-110    Bolivar Glover MD  2019

## 2019-05-22 ENCOUNTER — HOSPITAL ENCOUNTER (OUTPATIENT)
Dept: PHYSICAL THERAPY | Facility: HOSPITAL | Age: 79
Setting detail: THERAPIES SERIES
Discharge: HOME | End: 2019-05-22
Attending: ORTHOPAEDIC SURGERY

## 2019-05-22 DIAGNOSIS — R29.898 DECREASED STRENGTH INVOLVING KNEE JOINT: ICD-10-CM

## 2019-05-22 DIAGNOSIS — M25.661 DECREASED RANGE OF MOTION (ROM) OF RIGHT KNEE: ICD-10-CM

## 2019-05-22 DIAGNOSIS — Z96.651 S/P TKR (TOTAL KNEE REPLACEMENT), RIGHT: Primary | ICD-10-CM

## 2019-05-22 DIAGNOSIS — M25.561 ACUTE PAIN OF RIGHT KNEE: ICD-10-CM

## 2019-05-22 PROCEDURE — 97110 THERAPEUTIC EXERCISES: CPT

## 2019-05-22 NOTE — THERAPY TREATMENT NOTE
Outpatient Physical Therapy Ortho Treatment Note  [facility]     Patient Name: Trice Melara  : 1940  MRN: [id number]  Today's Date: 2019      Visit Date: 2019    Visit Dx:    ICD-10-CM ICD-9-CM   1. S/P TKR (total knee replacement), right Z96.651 V43.65   2. Acute pain of right knee M25.561 719.46   3. Decreased range of motion (ROM) of right knee M25.661 719.56   4. Decreased strength involving knee joint R29.898 729.89       Patient Active Problem List   Diagnosis   • Gastroesophageal reflux disease   • Knee pain   • Iron deficiency anemia   • Abnormal kidney function   • Acute renal failure (CMS/HCC)   • Arthritis of knee   • Arthritis   • Osteoarthrosis, localized, primary, knee   • Primary osteoarthritis of knees, bilateral   • Closed displaced intertrochanteric fracture of right femur (CMS/HCC)   • Fall   • Osteoporosis   • Primary osteoarthritis of right knee   • OA (osteoarthritis) of knee        Past Medical History:   Diagnosis Date   • Abdominal wall dehiscence 2016    Fascial Dehiscence   • Acute renal failure (CMS/HCC)    • Aspiration pneumonia (CMS/HCC)    • Colovaginal fistula    • Diverticulitis 2010    with paracolonic abscess and colovesical fistula,    • Gastric ulcer    • GERD (gastroesophageal reflux disease)    • H/O Bowel perforation    • History of diverticular abscess    • History of GI bleed    • Osteoarthritis    • Peritonitis (CMS/HCC) 2016   • Septic shock (CMS/[facility])    • Small bowel anastomotic leak 2016        Past Surgical History:   Procedure Laterality Date   • BREAST CYST EXCISION     • COLON RESECTION  2016    Smal Bowel Resection -Dr. Syd Maharaj   • COLON SURGERY Left     with closure of fistula   • EXPLORATORY LAPAROTOMY  2015    With Extensive Adhesiolysis of over 1 hour to allow conduct of the operation and repair of small intestine-Dr. Syd Maharaj   • EXPLORATORY LAPAROTOMY  2016      Syd Maharaj   • INCISIONAL HERNIA REPAIR  01/08/2016    Incisional Hernia Repair with Fascial Dehiscence Repair with Mesh and Bilateral Component Separation-Dr. Syd Maharaj   • [place] OPEN FIX INTER/SUBTROCH FX,IMPLNT Right 7/24/2017    Procedure: FEMUR INTRAMEDULLARY NAILING/RODDING;  Surgeon: Huan Weiss MD;  Location: [facility];  Service: Orthopedics   • TOTAL ABDOMINAL HYSTERECTOMY  1970s   • TOTAL KNEE ARTHROPLASTY Right 4/29/2019    Procedure: RT TOTAL KNEE ARTHROPLASTY WITH NAVIGATION;  Surgeon: Bolivar Glover MD;  Location: [facility];  Service: Orthopedics       PT Ortho     Row Name 05/22/19 1500       Subjective Comments    Subjective Comments  Pt states her right knee already feels better than her left.RTMD yesterday and all good  -SI       Subjective Pain    Able to rate subjective pain?  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  HEP upgraded and pt reminded to elevate and ice  -SI       User Key  (r) = Recorded By, (t) = Taken By, (c) = Cosigned By    Initials Name Provider Type    Larissa Saravia PTA Physical Therapy Assistant          Modalities     Row Name 05/22/19 1500             Ice    Patient reports will apply ice at home to involved area  Yes  -SI        User Key  (r) = Recorded By, (t) = Taken By, (c) = Cosigned By    Initials Name Provider Type    Larissa Saravia PTA Physical Therapy Assistant        Exercises     Row Name  05/22/19 1500             Subjective Comments    Subjective Comments  Pt states her right knee already feels better than her left.RTMD yesterday and all good  -SI         Subjective Pain    Able to rate subjective pain?  yes  -SI         Total Minutes    71500 - PT Therapeutic Exercise Minutes  45  -SI         Exercise 1    Exercise Name 1  heel slide with towel  -SI      Sets 1  1  -SI      Reps 1  10  -SI      Time 1  10  -SI         Exercise 2    Exercise Name 2  gastroc stretch with towel  -SI      Sets 2  1  -SI      Reps 2  5  -SI      Time 2  20  -SI         Exercise 3    Exercise Name 3  SAQ and LAQ 2 lbs  -SI      Sets 3  2  -SI      Reps 3  10  -SI      Time 3  5 sec  -SI         Exercise 4    Exercise Name 4  hook hip ABD blue  -SI      Sets 4  1  -SI      Reps 4  10  -SI         Exercise 5    Exercise Name 5  hookhip ADD  -SI      Sets 5  3  -SI      Reps 5  6  -SI      Time 5  3  -SI         Exercise 6    Exercise Name 6  HS curls green band  -SI      Sets 6  2  -SI      Reps 6  10  -SI      Time 6  5 sec  -SI         Exercise 7    Exercise Name 7  HS stretch long sitting  -SI      Sets 7  1  -SI      Reps 7  5  -SI      Time 7  5 sec  -SI        User Key  (r) = Recorded By, (t) = Taken By, (c) = Cosigned By    Initials Name Provider Type    SI Larissa York PTA Physical Therapy Assistant                           Therapy Education  Given: HEP, Symptoms/condition management, Pain management, Edema management  Program: Progressed  How Provided: Verbal, Demonstration, Written  Provided to: Patient  Level of Understanding: Teach back education performed              Time Calculation:   Start Time: 1500  Stop Time: 1545  Time Calculation (min): 45 min  Total Timed Code Minutes- PT: 45 minute(s)  Therapy Charges for Today     Code Description Service Date Service Provider Modifiers Qty    03077330538 HC PT THER PROC EA 15 MIN 5/22/2019 Larissa York PTA GP 3                    Larissa York,  PTA  5/22/2019

## 2019-05-24 ENCOUNTER — HOSPITAL ENCOUNTER (OUTPATIENT)
Dept: PHYSICAL THERAPY | Facility: HOSPITAL | Age: 79
Setting detail: THERAPIES SERIES
Discharge: HOME | End: 2019-05-24
Attending: ORTHOPAEDIC SURGERY

## 2019-05-24 DIAGNOSIS — R29.898 DECREASED STRENGTH INVOLVING KNEE JOINT: ICD-10-CM

## 2019-05-24 DIAGNOSIS — M25.661 DECREASED RANGE OF MOTION (ROM) OF RIGHT KNEE: ICD-10-CM

## 2019-05-24 DIAGNOSIS — Z96.651 S/P TKR (TOTAL KNEE REPLACEMENT), RIGHT: Primary | ICD-10-CM

## 2019-05-24 DIAGNOSIS — M25.561 ACUTE PAIN OF RIGHT KNEE: ICD-10-CM

## 2019-05-24 PROCEDURE — 97110 THERAPEUTIC EXERCISES: CPT

## 2019-05-24 NOTE — THERAPY TREATMENT NOTE
Outpatient Physical Therapy Ortho Treatment Note  [facility]     Patient Name: Trice Melara  : 1940  MRN: [id number]  Today's Date: 2019      Visit Date: 2019    Visit Dx:    ICD-10-CM ICD-9-CM   1. S/P TKR (total knee replacement), right Z96.651 V43.65   2. Acute pain of right knee M25.561 719.46   3. Decreased range of motion (ROM) of right knee M25.661 719.56   4. Decreased strength involving knee joint R29.898 729.89       Patient Active Problem List   Diagnosis   • Gastroesophageal reflux disease   • Knee pain   • Iron deficiency anemia   • Abnormal kidney function   • Acute renal failure (CMS/HCC)   • Arthritis of knee   • Arthritis   • Osteoarthrosis, localized, primary, knee   • Primary osteoarthritis of knees, bilateral   • Closed displaced intertrochanteric fracture of right femur (CMS/HCC)   • Fall   • Osteoporosis   • Primary osteoarthritis of right knee   • OA (osteoarthritis) of knee        Past Medical History:   Diagnosis Date   • Abdominal wall dehiscence 2016    Fascial Dehiscence   • Acute renal failure (CMS/HCC)    • Aspiration pneumonia (CMS/HCC)    • Colovaginal fistula    • Diverticulitis 2010    with paracolonic abscess and colovesical fistula,    • Gastric ulcer    • GERD (gastroesophageal reflux disease)    • H/O Bowel perforation    • History of diverticular abscess    • History of GI bleed    • Osteoarthritis    • Peritonitis (CMS/HCC) 2016   • Septic shock (CMS/[facility])    • Small bowel anastomotic leak 2016        Past Surgical History:   Procedure Laterality Date   • BREAST CYST EXCISION     • COLON RESECTION  2016    Smal Bowel Resection -Dr. Syd Maharaj   • COLON SURGERY Left     with closure of fistula   • EXPLORATORY LAPAROTOMY  2015    With Extensive Adhesiolysis of over 1 hour to allow conduct of the operation and repair of small intestine-Dr. Syd Maharaj   • EXPLORATORY LAPAROTOMY  2016      "Syd Maharaj   • INCISIONAL HERNIA REPAIR  01/08/2016    Incisional Hernia Repair with Fascial Dehiscence Repair with Mesh and Bilateral Component Separation-Dr. Syd Maharaj   • AL OPEN FIX INTER/SUBTROCH FX,IMPLNT Right 7/24/2017    Procedure: FEMUR INTRAMEDULLARY NAILING/RODDING;  Surgeon: Huan Weiss MD;  Location: [facility];  Service: Orthopedics   • TOTAL ABDOMINAL HYSTERECTOMY  1970s   • TOTAL KNEE ARTHROPLASTY Right 4/29/2019    Procedure: RT TOTAL KNEE ARTHROPLASTY WITH NAVIGATION;  Surgeon: Bolivar Glover MD;  Location: [facility];  Service: Orthopedics       PT Ortho     Row Name 05/24/19 0900       Subjective Comments    Subjective Comments  late today due to traffic...C/O bilateral knee pain today and poor sleep last night.  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  -SI       Subjective Pain    Able to rate subjective pain?  yes  -SI    Subjective Pain Comment  right knee pain medial joint line region, left knee \"deep ache\"  -SI       Right Lower Ext    Rt Knee Extension/Flexion AROM  -3 to 111  -SI    Row Name 05/22/19 1500       Subjective Comments    Subjective Comments  Pt states her right knee already feels better than her left.RTMD yesterday and all good  -SI       Subjective Pain    Able to rate subjective pain?  yes  -SI       Right Lower Ext    Rt Knee Extension/Flexion AROM  110  -SI       Lower Extremity Flexibility    Hamstrings  Bilateral:;Moderately limited  -SI       Gait/Stairs Assessment/Training    Comment (Gait/Stairs)  upright posture with gait using RWX discussed  -SI      User Key  (r) = Recorded By, (t) = Taken By, (c) = Cosigned By    Initials Name Provider Type    SI Larissa Yokr, PTA Physical Therapy Assistant                      PT Assessment/Plan     Row Name 05/24/19 0936          PT Assessment    Assessment Comments  30 min session as pt 15 min late.  Increase knee pain today so ex modified.  Wanted to put ice on " "after tx but pt denied since wanted to go to work...Will elevate and ice when get home from work  -SI       User Key  (r) = Recorded By, (t) = Taken By, (c) = Cosigned By    Initials Name Provider Type    Larissa Saravia PTA Physical Therapy Assistant          Modalities     Row Name 05/24/19 0900             Ice    Patient reports will apply ice at home to involved area  Yes  -SI        User Key  (r) = Recorded By, (t) = Taken By, (c) = Cosigned By    Initials Name Provider Type    Larissa Saravia PTA Physical Therapy Assistant        Exercises     Row Name 05/24/19 0900             Subjective Comments    Subjective Comments  late today due to traffic...C/O bilateral knee pain today and poor sleep last night.  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  -SI         Subjective Pain    Able to rate subjective pain?  yes  -SI      Subjective Pain Comment  right knee pain medial joint line region, left knee \"deep ache\"  -SI         Total Minutes    03295 - PT Therapeutic Exercise Minutes  30  -SI         Exercise 1    Exercise Name 1  heel slide with towel  -SI      Sets 1  1  -SI      Reps 1  10  -SI      Time 1  20  -SI         Exercise 2    Exercise Name 2  gastroc stretch with towel  -SI      Sets 2  1  -SI      Reps 2  5  -SI      Time 2  20  -SI         Exercise 3    Exercise Name 3  SAQ and LAQ 2 lbs  -SI      Sets 3  2  -SI      Reps 3  10  -SI      Time 3  5 sec  -SI      Additional Comments  no weights today  -SI         Exercise 4    Exercise Name 4  hook hip ABD blue  -SI      Sets 4  2  -SI      Reps 4  10  -SI         Exercise 5    Exercise Name 5  hookhip ADD  -SI      Sets 5  3  -SI      Reps 5  6  -SI      Time 5  3  -SI      Additional Comments  -----------------  -SI         Exercise 6    Exercise Name 6  HS curls green band  -SI      Sets 6  2  -SI      Reps 6  10  -SI      Time 6  5 sec  -SI         Exercise 7    Exercise Name 7  HS stretch long sitting  " -SI      Sets 7  1  -SI      Reps 7  5  -SI      Time 7  5 sec  -SI        User Key  (r) = Recorded By, (t) = Taken By, (c) = Cosigned By    Initials Name Provider Type    Larissa Saravia PTA Physical Therapy Assistant                           Therapy Education  Given: HEP, Symptoms/condition management  Program: Modified  How Provided: Verbal, Demonstration, Written  Provided to: Patient  Level of Understanding: Teach back education performed              Time Calculation:   Start Time: 0900  Stop Time: 0932  Time Calculation (min): 32 min  Total Timed Code Minutes- PT: 30 minute(s)  Therapy Charges for Today     Code Description Service Date Service Provider Modifiers Qty    11830524148 HC PT THER PROC EA 15 MIN 5/24/2019 Larissa York PTA GP 2                    Larissa York PTA  5/24/2019

## 2019-05-28 ENCOUNTER — HOSPITAL ENCOUNTER (OUTPATIENT)
Dept: PHYSICAL THERAPY | Facility: HOSPITAL | Age: 79
Setting detail: THERAPIES SERIES
Discharge: HOME | End: 2019-05-28
Attending: ORTHOPAEDIC SURGERY

## 2019-05-28 DIAGNOSIS — R29.898 DECREASED STRENGTH INVOLVING KNEE JOINT: ICD-10-CM

## 2019-05-28 DIAGNOSIS — M25.661 DECREASED RANGE OF MOTION (ROM) OF RIGHT KNEE: ICD-10-CM

## 2019-05-28 DIAGNOSIS — Z96.651 S/P TKR (TOTAL KNEE REPLACEMENT), RIGHT: Primary | ICD-10-CM

## 2019-05-28 DIAGNOSIS — M25.561 ACUTE PAIN OF RIGHT KNEE: ICD-10-CM

## 2019-05-28 PROCEDURE — 97116 GAIT TRAINING THERAPY: CPT

## 2019-05-28 PROCEDURE — 97110 THERAPEUTIC EXERCISES: CPT

## 2019-05-28 NOTE — THERAPY TREATMENT NOTE
Outpatient Physical Therapy Ortho Treatment Note  [facility]     Patient Name: Trice Melara  : 1940  MRN: [id number]  Today's Date: 2019      Visit Date: 2019    Visit Dx:    ICD-10-CM ICD-9-CM   1. S/P TKR (total knee replacement), right Z96.651 V43.65   2. Acute pain of right knee M25.561 719.46   3. Decreased range of motion (ROM) of right knee M25.661 719.56   4. Decreased strength involving knee joint R29.898 729.89       Patient Active Problem List   Diagnosis   • Gastroesophageal reflux disease   • Knee pain   • Iron deficiency anemia   • Abnormal kidney function   • Acute renal failure (CMS/HCC)   • Arthritis of knee   • Arthritis   • Osteoarthrosis, localized, primary, knee   • Primary osteoarthritis of knees, bilateral   • Closed displaced intertrochanteric fracture of right femur (CMS/HCC)   • Fall   • Osteoporosis   • Primary osteoarthritis of right knee   • OA (osteoarthritis) of knee        Past Medical History:   Diagnosis Date   • Abdominal wall dehiscence 2016    Fascial Dehiscence   • Acute renal failure (CMS/HCC)    • Aspiration pneumonia (CMS/HCC)    • Colovaginal fistula    • Diverticulitis 2010    with paracolonic abscess and colovesical fistula,    • Gastric ulcer    • GERD (gastroesophageal reflux disease)    • H/O Bowel perforation    • History of diverticular abscess    • History of GI bleed    • Osteoarthritis    • Peritonitis (CMS/HCC) 2016   • Septic shock (CMS/[facility])    • Small bowel anastomotic leak 2016        Past Surgical History:   Procedure Laterality Date   • BREAST CYST EXCISION     • COLON RESECTION  2016    Smal Bowel Resection -Dr. Syd Maharaj   • COLON SURGERY Left     with closure of fistula   • EXPLORATORY LAPAROTOMY  2015    With Extensive Adhesiolysis of over 1 hour to allow conduct of the operation and repair of small intestine-Dr. Syd Maharaj   • EXPLORATORY LAPAROTOMY  2016      Syd Maharaj   • INCISIONAL HERNIA REPAIR  01/08/2016    Incisional Hernia Repair with Fascial Dehiscence Repair with Mesh and Bilateral Component Separation-Dr. Syd Maharaj   • ID OPEN FIX INTER/SUBTROCH FX,IMPLNT Right 7/24/2017    Procedure: FEMUR INTRAMEDULLARY NAILING/RODDING;  Surgeon: Huan Weiss MD;  Location: [facility];  Service: Orthopedics   • TOTAL ABDOMINAL HYSTERECTOMY  1970s   • TOTAL KNEE ARTHROPLASTY Right 4/29/2019    Procedure: RT TOTAL KNEE ARTHROPLASTY WITH NAVIGATION;  Surgeon: Bolivar Glover MD;  Location: [facility];  Service: Orthopedics       PT Ortho     Row Name 05/28/19 1500       Subjective Comments    Subjective Comments  Pt states she is scheduling her PT on the days she does not work and after the weekend off feeling less tired and less knee pain .    -SI       Subjective Pain    Able to rate subjective pain?  yes  -SI    Subjective Pain Comment  left greater than right knee pain.  -SI       Right Lower Ext    Rt Knee Extension/Flexion AROM  -3 to 120  -SI       MMT Right Lower Ext    Rt Hip Flexion MMT, Gross Movement  (4-/5) good minus  -SI    Rt Hip Extension MMT, Gross Movement  (4-/5) good minus  -SI    Rt Hip ABduction MMT, Gross Movement  (4-/5) good minus  -SI    Rt Hip ADduction MMT, Gross Movement  (4-/5) good minus  -SI    Rt Knee Extension MMT, Gross Movement  (4-/5) good minus  -SI    Rt Knee Flexion MMT, Gross Movement  (4-/5) good minus  -SI       Balance Skills Training    SLS  7 seconds on either LE  -SI       Gait/Stairs Assessment/Training    66480 - Gait Training Minutes   15  -SI    Gait/Stairs Assessment/Training  gait/ambulation assistive device  -SI    [place] Level (Gait)  supervision  -SI    Assistive Device (Gait)  cane, straight  -SI    Distance in Feet (Gait)  50  -SI    Handrail Location (Stairs)  right side (ascending)  -SI    Number of Steps (Stairs)  4  -SI    Ascending Technique (Stairs)  step-to-step  -SI    Descending  Technique (Stairs)  step-to-step  -SI    Comment (Gait/Stairs)  pt using her operated right LE as dominant one on stairs  -SI      User Key  (r) = Recorded By, (t) = Taken By, (c) = Cosigned By    Initials Name Provider Type    Larissa Saravia PTA Physical Therapy Assistant                      PT Assessment/Plan     Row Name 05/28/19 1522          PT Assessment    Assessment Comments  pt is using a cane now for all gait except when has to carry a bag for work, then uses RWX  -SI       User Key  (r) = Recorded By, (t) = Taken By, (c) = Cosigned By    Initials Name Provider Type    Larissa Saravia PTA Physical Therapy Assistant          Modalities     Row Name 05/28/19 1500             Ice    Patient reports will apply ice at home to involved area  Yes  -SI        User Key  (r) = Recorded By, (t) = Taken By, (c) = Cosigned By    Initials Name Provider Type    Larissa Saravia PTA Physical Therapy Assistant        Exercises     Row Name 05/28/19 1500             Subjective Comments    Subjective Comments  Pt states she is scheduling her PT on the days she does not work and after the weekend off feeling less tired and less knee pain .    -SI         Subjective Pain    Able to rate subjective pain?  yes  -SI      Subjective Pain Comment  left greater than right knee pain.  -SI         Total Minutes    34842 - Gait Training Minutes   15  -SI      20732 - PT Therapeutic Exercise Minutes  30  -SI         Exercise 1    Exercise Name 1  heel slide with towel  -SI      Sets 1  1  -SI      Reps 1  10  -SI      Time 1  20  -SI         Exercise 2    Exercise Name 2  gastroc stretch with towel  -SI      Sets 2  1  -SI      Reps 2  5  -SI      Time 2  20  -SI         Exercise 3    Exercise Name 3  SAQ and LAQ 2 lbs  -SI      Sets 3  2  -SI      Reps 3  10  -SI      Time 3  5 sec  -SI         Exercise 4    Exercise Name 4  hook hip ABD blue  -SI      Sets 4  2  -SI      Reps 4  10  -SI         Exercise 5    Exercise Name  5  hookhip ADD  -SI      Sets 5  3  -SI      Reps 5  6  -SI      Time 5  3  -SI         Exercise 6    Exercise Name 6  HS curls green band  -SI      Sets 6  2  -SI      Reps 6  10  -SI      Time 6  5 sec  -SI         Exercise 7    Exercise Name 7  HS stretch long sitting  -SI      Sets 7  1  -SI      Reps 7  5  -SI      Time 7  5 sec  -SI        User Key  (r) = Recorded By, (t) = Taken By, (c) = Cosigned By    Initials Name Provider Type    Larissa Saravia PTA Physical Therapy Assistant                       PT OP Goals     Row Name 05/28/19 1500          PT Short Term Goals    STG 1  Pt will be independent with HEP to improve R knee ArOM and strength to allow full return to ADL's and ambulation without impairment.  -SI     STG 1 Progress  Progressing  -SI     STG 2  Pt will have R knee AROM 0 to 120.  -SI     STG 2 Progress  Partially Met  -SI     STG 3  Pt will have R knee Mmt grossly 4/5  -SI     STG 3 Progress  Progressing  -SI        Long Term Goals    LTG 1  Pt will ambulate with SC or no A.D with reciprocal gait with heel to toe pattern.  -SI     LTG 1 Progress  Progressing  -SI       User Key  (r) = Recorded By, (t) = Taken By, (c) = Cosigned By    Initials Name Provider Type    Larissa Saravia PTA Physical Therapy Assistant          Therapy Education  Given: HEP, Symptoms/condition management  Program: Progressed  How Provided: Verbal, Demonstration, Written  Provided to: Patient  Level of Understanding: Teach back education performed              Time Calculation:   Start Time: 1330  Stop Time: 1415  Time Calculation (min): 45 min  Total Timed Code Minutes- PT: 45 minute(s)  Therapy Charges for Today     Code Description Service Date Service Provider Modifiers Qty    38314274352 HC PT THER PROC EA 15 MIN 5/28/2019 Larissa York PTA GP 2    18571436129 HC GAIT TRAINING EA 15 MIN 5/28/2019 Larissa York PTA GP 1                    Larissa York PTA  5/28/2019

## 2019-05-30 ENCOUNTER — HOSPITAL ENCOUNTER (OUTPATIENT)
Dept: PHYSICAL THERAPY | Facility: HOSPITAL | Age: 79
Setting detail: THERAPIES SERIES
Discharge: HOME | End: 2019-05-30
Attending: ORTHOPAEDIC SURGERY

## 2019-05-30 DIAGNOSIS — M25.561 ACUTE PAIN OF RIGHT KNEE: ICD-10-CM

## 2019-05-30 DIAGNOSIS — M25.661 DECREASED RANGE OF MOTION (ROM) OF RIGHT KNEE: ICD-10-CM

## 2019-05-30 DIAGNOSIS — R29.898 DECREASED STRENGTH INVOLVING KNEE JOINT: ICD-10-CM

## 2019-05-30 DIAGNOSIS — Z96.651 S/P TKR (TOTAL KNEE REPLACEMENT), RIGHT: Primary | ICD-10-CM

## 2019-05-30 PROCEDURE — 97110 THERAPEUTIC EXERCISES: CPT

## 2019-05-30 NOTE — THERAPY TREATMENT NOTE
Outpatient Physical Therapy Ortho Treatment Note  [facility]     Patient Name: Trice Melara  : 1940  MRN: [id number]  Today's Date: 2019      Visit Date: 2019    Visit Dx:    ICD-10-CM ICD-9-CM   1. S/P TKR (total knee replacement), right Z96.651 V43.65   2. Acute pain of right knee M25.561 719.46   3. Decreased range of motion (ROM) of right knee M25.661 719.56   4. Decreased strength involving knee joint R29.898 729.89       Patient Active Problem List   Diagnosis   • Gastroesophageal reflux disease   • Knee pain   • Iron deficiency anemia   • Abnormal kidney function   • Acute renal failure (CMS/HCC)   • Arthritis of knee   • Arthritis   • Osteoarthrosis, localized, primary, knee   • Primary osteoarthritis of knees, bilateral   • Closed displaced intertrochanteric fracture of right femur (CMS/HCC)   • Fall   • Osteoporosis   • Primary osteoarthritis of right knee   • OA (osteoarthritis) of knee        Past Medical History:   Diagnosis Date   • Abdominal wall dehiscence 2016    Fascial Dehiscence   • Acute renal failure (CMS/HCC)    • Aspiration pneumonia (CMS/HCC)    • Colovaginal fistula    • Diverticulitis 2010    with paracolonic abscess and colovesical fistula,    • Gastric ulcer    • GERD (gastroesophageal reflux disease)    • H/O Bowel perforation    • History of diverticular abscess    • History of GI bleed    • Osteoarthritis    • Peritonitis (CMS/HCC) 2016   • Septic shock (CMS/[facility])    • Small bowel anastomotic leak 2016        Past Surgical History:   Procedure Laterality Date   • BREAST CYST EXCISION     • COLON RESECTION  2016    Smal Bowel Resection -Dr. Syd Maharaj   • COLON SURGERY Left     with closure of fistula   • EXPLORATORY LAPAROTOMY  2015    With Extensive Adhesiolysis of over 1 hour to allow conduct of the operation and repair of small intestine-Dr. Syd Maharaj   • EXPLORATORY LAPAROTOMY  2016      Syd Maharaj   • INCISIONAL HERNIA REPAIR  01/08/2016    Incisional Hernia Repair with Fascial Dehiscence Repair with Mesh and Bilateral Component Separation-Dr. Syd Maharaj   • SC OPEN FIX INTER/SUBTROCH FX,IMPLNT Right 7/24/2017    Procedure: FEMUR INTRAMEDULLARY NAILING/RODDING;  Surgeon: Huan Weiss MD;  Location: [facility];  Service: Orthopedics   • TOTAL ABDOMINAL HYSTERECTOMY  1970s   • TOTAL KNEE ARTHROPLASTY Right 4/29/2019    Procedure: RT TOTAL KNEE ARTHROPLASTY WITH NAVIGATION;  Surgeon: Bolivar Glover MD;  Location: [facility];  Service: Orthopedics       PT Ortho     Row Name 05/30/19 1100       Subjective Comments    Subjective Comments  Pt states with the rainy weather she aches all over.  -SI       Subjective Pain    Able to rate subjective pain?  yes  -SI       Right Lower Ext    Rt Knee Extension/Flexion AROM  -3 to 120  -SI       MMT Right Lower Ext    Rt Hip ABduction MMT, Gross Movement  (4-/5) good minus  -SI    Rt Knee Extension MMT, Gross Movement  (4/5) good  -SI    Rt Knee Flexion MMT, Gross Movement  (4/5) good  -SI       Balance Skills Training    SLS  10 either LE  -SI       Transfers    Sit-Stand [place] (Transfers)  independent x 5 with verbal cueing and no UE support  -SI       Gait/Stairs Assessment/Training    Comment (Gait/Stairs)  mildly altered gait with cane  -SI    Row Name 05/28/19 1500       Subjective Comments    Subjective Comments  Pt states she is scheduling her PT on the days she does not work and after the weekend off feeling less tired and less knee pain .    -SI       Subjective Pain    Able to rate subjective pain?  yes  -SI    Subjective Pain Comment  left greater than right knee pain.  -SI       Right Lower Ext    Rt Knee Extension/Flexion AROM  -3 to 120  -SI       MMT Right Lower Ext    Rt Hip Flexion MMT, Gross Movement  (4-/5) good minus  -SI    Rt Hip Extension MMT, Gross Movement  (4-/5) good minus  -SI    Rt Hip ABduction MMT,  Gross Movement  (4-/5) good minus  -SI    Rt Hip ADduction MMT, Gross Movement  (4-/5) good minus  -SI    Rt Knee Extension MMT, Gross Movement  (4-/5) good minus  -SI    Rt Knee Flexion MMT, Gross Movement  (4-/5) good minus  -SI       Balance Skills Training    SLS  7 seconds on either LE  -SI       Gait/Stairs Assessment/Training    10858 - Gait Training Minutes   15  -SI    Gait/Stairs Assessment/Training  gait/ambulation assistive device  -SI    [place] Level (Gait)  supervision  -SI    Assistive Device (Gait)  cane, straight  -SI    Distance in Feet (Gait)  50  -SI    Handrail Location (Stairs)  right side (ascending)  -SI    Number of Steps (Stairs)  4  -SI    Ascending Technique (Stairs)  step-to-step  -SI    Descending Technique (Stairs)  step-to-step  -SI    Comment (Gait/Stairs)  pt using her operated right LE as dominant one on stairs  -SI      User Key  (r) = Recorded By, (t) = Taken By, (c) = Cosigned By    Initials Name Provider Type    Larissa Saravia PTA Physical Therapy Assistant                      PT Assessment/Plan     Row Name 05/30/19 1150          PT Assessment    Assessment Comments  Pt doing very well with ROM right knee.  Strengthening in progress.  Her OA in left knee alters her gait more than TKA right knee  -SI       User Key  (r) = Recorded By, (t) = Taken By, (c) = Cosigned By    Initials Name Provider Type    Larissa Saravia PTA Physical Therapy Assistant            Exercises     Row Name 05/30/19 1100             Subjective Comments    Subjective Comments  Pt states with the rainy weather she aches all over.  -SI         Subjective Pain    Able to rate subjective pain?  yes  -SI         Total Minutes    61722 - PT Therapeutic Exercise Minutes  45  -SI         Exercise 1    Exercise Name 1  heel slide with towel  -SI      Sets 1  1  -SI      Reps 1  10  -SI      Time 1  20  -SI         Exercise 2    Exercise Name 2  gastroc stretch with towel  -SI      Sets 2  1  -SI       Reps 2  5  -SI      Time 2  20  -SI         Exercise 3    Exercise Name 3  SAQ and LAQ 2 lbs  -SI      Sets 3  2  -SI      Reps 3  10  -SI      Time 3  5 sec  -SI         Exercise 4    Exercise Name 4  hook hip ABD blue  -SI      Sets 4  2  -SI      Reps 4  10  -SI         Exercise 5    Exercise Name 5  hookhip ADD  -SI      Sets 5  3  -SI      Reps 5  6  -SI      Time 5  3  -SI         Exercise 6    Exercise Name 6  HS curls green band  -SI      Sets 6  2  -SI      Reps 6  10  -SI      Time 6  5 sec  -SI         Exercise 7    Exercise Name 7  HS stretch long sitting  -SI      Sets 7  1  -SI      Reps 7  5  -SI      Time 7  5 sec  -SI         Exercise 8    Exercise Name 8  hip abd side  -SI      Sets 8  2  -SI      Reps 8  10  -SI         Exercise 9    Exercise Name 9  sit to stand to sit x 5  -SI         Exercise 10    Exercise Name 10  SL stance   -SI      Reps 10  2  -SI      Time 10  10  -SI        User Key  (r) = Recorded By, (t) = Taken By, (c) = Cosigned By    Initials Name Provider Type    Larissa Saravia PTA Physical Therapy Assistant                           Therapy Education  Given: HEP, Symptoms/condition management  Program: Progressed  How Provided: Verbal, Demonstration, Written  Provided to: Patient  Level of Understanding: Teach back education performed              Time Calculation:   Start Time: 1100  Stop Time: 1145  Time Calculation (min): 45 min  Total Timed Code Minutes- PT: 45 minute(s)  Therapy Charges for Today     Code Description Service Date Service Provider Modifiers Qty    19056273488 HC PT THER PROC EA 15 MIN 5/30/2019 Larissa York PTA GP 3                    Larissa York PTA  5/30/2019

## 2019-06-06 ENCOUNTER — HOSPITAL ENCOUNTER (OUTPATIENT)
Dept: PHYSICAL THERAPY | Facility: HOSPITAL | Age: 79
Setting detail: THERAPIES SERIES
Discharge: HOME | End: 2019-06-06
Attending: ORTHOPAEDIC SURGERY

## 2019-06-06 DIAGNOSIS — M25.561 ACUTE PAIN OF RIGHT KNEE: ICD-10-CM

## 2019-06-06 DIAGNOSIS — R29.898 DECREASED STRENGTH INVOLVING KNEE JOINT: ICD-10-CM

## 2019-06-06 DIAGNOSIS — Z96.651 S/P TKR (TOTAL KNEE REPLACEMENT), RIGHT: Primary | ICD-10-CM

## 2019-06-06 DIAGNOSIS — M25.661 DECREASED RANGE OF MOTION (ROM) OF RIGHT KNEE: ICD-10-CM

## 2019-06-06 PROCEDURE — 97110 THERAPEUTIC EXERCISES: CPT

## 2019-06-06 NOTE — THERAPY TREATMENT NOTE
Outpatient Physical Therapy Ortho Treatment Note  [facility]     Patient Name: Trice Melara  : 1940  MRN: [id number]  Today's Date: 2019      Visit Date: 2019    Visit Dx:    ICD-10-CM ICD-9-CM   1. S/P TKR (total knee replacement), right Z96.651 V43.65   2. Acute pain of right knee M25.561 719.46   3. Decreased range of motion (ROM) of right knee M25.661 719.56   4. Decreased strength involving knee joint R29.898 729.89       Patient Active Problem List   Diagnosis   • Gastroesophageal reflux disease   • Knee pain   • Iron deficiency anemia   • Abnormal kidney function   • Acute renal failure (CMS/HCC)   • Arthritis of knee   • Arthritis   • Osteoarthrosis, localized, primary, knee   • Primary osteoarthritis of knees, bilateral   • Closed displaced intertrochanteric fracture of right femur (CMS/HCC)   • Fall   • Osteoporosis   • Primary osteoarthritis of right knee   • OA (osteoarthritis) of knee        Past Medical History:   Diagnosis Date   • Abdominal wall dehiscence 2016    Fascial Dehiscence   • Acute renal failure (CMS/HCC)    • Aspiration pneumonia (CMS/HCC)    • Colovaginal fistula    • Diverticulitis 2010    with paracolonic abscess and colovesical fistula,    • Gastric ulcer    • GERD (gastroesophageal reflux disease)    • H/O Bowel perforation    • History of diverticular abscess    • History of GI bleed    • Osteoarthritis    • Peritonitis (CMS/HCC) 2016   • Septic shock (CMS/[facility])    • Small bowel anastomotic leak 2016        Past Surgical History:   Procedure Laterality Date   • BREAST CYST EXCISION     • COLON RESECTION  2016    Smal Bowel Resection -Dr. Syd Maharaj   • COLON SURGERY Left     with closure of fistula   • EXPLORATORY LAPAROTOMY  2015    With Extensive Adhesiolysis of over 1 hour to allow conduct of the operation and repair of small intestine-Dr. Syd Maharaj   • EXPLORATORY LAPAROTOMY  2016      Syd Maharaj   • INCISIONAL HERNIA REPAIR  01/08/2016    Incisional Hernia Repair with Fascial Dehiscence Repair with Mesh and Bilateral Component Separation-Dr. Syd Maharaj   • [place] OPEN FIX INTER/SUBTROCH FX,IMPLNT Right 7/24/2017    Procedure: FEMUR INTRAMEDULLARY NAILING/RODDING;  Surgeon: Huan Weiss MD;  Location: [facility];  Service: Orthopedics   • TOTAL ABDOMINAL HYSTERECTOMY  1970s   • TOTAL KNEE ARTHROPLASTY Right 4/29/2019    Procedure: RT TOTAL KNEE ARTHROPLASTY WITH NAVIGATION;  Surgeon: Bolivar Glover MD;  Location: [facility];  Service: Orthopedics       PT Ortho     Row Name 06/06/19 1100       Subjective Comments    Subjective Comments  Pt states her right knee is doing great and she is suing cane independently.  -SI       Subjective Pain    Able to rate subjective pain?  yes  -SI    Subjective Pain Comment  intermittent left knee OA pain.  Min to none in right knee  -SI       Posture/Observations    Observations  Edema chronic in both lower legs  -SI       MMT Right Lower Ext    Rt Hip Flexion MMT, Gross Movement  (4/5) good  -SI    Rt Hip Extension MMT, Gross Movement  (4/5) good  -SI    Rt Hip ABduction MMT, Gross Movement  (4/5) good  -SI    Rt Hip ADduction MMT, Gross Movement  (4/5) good  -SI    Rt Knee Extension MMT, Gross Movement  (4/5) good  -SI    Rt Knee Flexion MMT, Gross Movement  (4+/5) good plus  -SI      User Key  (r) = Recorded By, (t) = Taken By, (c) = Cosigned By    Initials Name Provider Type    Larissa Saravia PTA Physical Therapy Assistant                      PT Assessment/Plan     Row Name 06/06/19 1148          PT Assessment    Assessment Comments  Final upgrades to HEP for strength right knee.  pt getting oob with cane regularly.  Tentitive plans for left knee TKA later this year  -SI       User Key  (r) = Recorded By, (t) = Taken By, (c) = Cosigned By    Initials Name Provider Type    Larissa Saravia PTA Physical Therapy Assistant             Exercises     Row Name 06/06/19 1100             Subjective Comments    Subjective Comments  Pt states her right knee is doing great and she is suing cane independently.  -SI         Subjective Pain    Able to rate subjective pain?  yes  -SI      Subjective Pain Comment  intermittent left knee OA pain.  Min to none in right knee  -SI         Total Minutes    71804 - PT Therapeutic Exercise Minutes  45  -SI         Exercise 1    Exercise Name 1  heel slide with towel  -SI      Sets 1  1  -SI      Reps 1  10  -SI      Time 1  20  -SI         Exercise 2    Exercise Name 2  gastroc stretch with towel  -SI      Sets 2  1  -SI      Reps 2  5  -SI      Time 2  20  -SI         Exercise 3    Exercise Name 3  SAQ and LAQ 2.5  lbs  -SI      Sets 3  2  -SI      Reps 3  10  -SI      Time 3  5 sec  -SI         Exercise 4    Exercise Name 4  hook hip ABD blue  -SI      Sets 4  2  -SI      Reps 4  10  -SI         Exercise 5    Exercise Name 5  hookhip ADD  -SI      Sets 5  3  -SI      Reps 5  6  -SI      Time 5  3  -SI         Exercise 6    Exercise Name 6  HS curls green band  -SI      Sets 6  2  -SI      Reps 6  10  -SI      Time 6  5 sec  -SI         Exercise 7    Exercise Name 7  HS stretch long sitting  -SI      Sets 7  1  -SI      Reps 7  5  -SI      Time 7  5 sec  -SI         Exercise 8    Exercise Name 8  hip abd side 2.5 lbs  -SI      Sets 8  2  -SI      Reps 8  10  -SI         Exercise 9    Exercise Name 9  sit to stand to sit x 5  -SI      Additional Comments  -----------  -SI         Exercise 10    Exercise Name 10  SL stance   -SI      Reps 10  2  -SI      Time 10  10  -SI      Additional Comments  ------------------  -SI        User Key  (r) = Recorded By, (t) = Taken By, (c) = Cosigned By    Initials Name Provider Type    Larissa Saravia, PTA Physical Therapy Assistant                           Therapy Education  Given: HEP(final upgrades to HEP for right knee.)  Program: Progressed  How Provided: Verbal,  Demonstration, Written  Provided to: Patient  Level of Understanding: Teach back education performed              Time Calculation:   Start Time: 1105  Stop Time: 1150  Time Calculation (min): 45 min  Total Timed Code Minutes- PT: 45 minute(s)  Therapy Charges for Today     Code Description Service Date Service Provider Modifiers Qty    82906396250 HC PT THER PROC EA 15 MIN 6/6/2019 Larissa York, PTA GP 3                    Larissa York PTA  6/6/2019

## 2019-06-13 ENCOUNTER — HOSPITAL ENCOUNTER (OUTPATIENT)
Dept: PHYSICAL THERAPY | Facility: HOSPITAL | Age: 79
Setting detail: THERAPIES SERIES
Discharge: HOME | End: 2019-06-13
Attending: ORTHOPAEDIC SURGERY

## 2019-06-13 DIAGNOSIS — M25.561 ACUTE PAIN OF RIGHT KNEE: ICD-10-CM

## 2019-06-13 DIAGNOSIS — M25.661 DECREASED RANGE OF MOTION (ROM) OF RIGHT KNEE: ICD-10-CM

## 2019-06-13 DIAGNOSIS — Z96.651 S/P TKR (TOTAL KNEE REPLACEMENT), RIGHT: Primary | ICD-10-CM

## 2019-06-13 DIAGNOSIS — R29.898 DECREASED STRENGTH INVOLVING KNEE JOINT: ICD-10-CM

## 2019-06-13 PROCEDURE — 97110 THERAPEUTIC EXERCISES: CPT

## 2019-06-13 NOTE — THERAPY TREATMENT NOTE
Outpatient Physical Therapy Ortho Treatment Note  [facility]     Patient Name: Trice Melara  : 1940  MRN: [id number]  Today's Date: 2019      Visit Date: 2019    Visit Dx:    ICD-10-CM ICD-9-CM   1. S/P TKR (total knee replacement), right Z96.651 V43.65   2. Acute pain of right knee M25.561 719.46   3. Decreased range of motion (ROM) of right knee M25.661 719.56   4. Decreased strength involving knee joint R29.898 729.89       Patient Active Problem List   Diagnosis   • Gastroesophageal reflux disease   • Knee pain   • Iron deficiency anemia   • Abnormal kidney function   • Acute renal failure (CMS/HCC)   • Arthritis of knee   • Arthritis   • Osteoarthrosis, localized, primary, knee   • Primary osteoarthritis of knees, bilateral   • Closed displaced intertrochanteric fracture of right femur (CMS/HCC)   • Fall   • Osteoporosis   • Primary osteoarthritis of right knee   • OA (osteoarthritis) of knee        Past Medical History:   Diagnosis Date   • Abdominal wall dehiscence 2016    Fascial Dehiscence   • Acute renal failure (CMS/HCC)    • Aspiration pneumonia (CMS/HCC)    • Colovaginal fistula    • Diverticulitis 2010    with paracolonic abscess and colovesical fistula,    • Gastric ulcer    • GERD (gastroesophageal reflux disease)    • H/O Bowel perforation    • History of diverticular abscess    • History of GI bleed    • Osteoarthritis    • Peritonitis (CMS/HCC) 2016   • Septic shock (CMS/[facility])    • Small bowel anastomotic leak 2016        Past Surgical History:   Procedure Laterality Date   • BREAST CYST EXCISION     • COLON RESECTION  2016    Smal Bowel Resection -Dr. Syd Maharaj   • COLON SURGERY Left     with closure of fistula   • EXPLORATORY LAPAROTOMY  2015    With Extensive Adhesiolysis of over 1 hour to allow conduct of the operation and repair of small intestine-Dr. Syd Maharaj   • EXPLORATORY LAPAROTOMY  2016      Syd Maharaj   • INCISIONAL HERNIA REPAIR  01/08/2016    Incisional Hernia Repair with Fascial Dehiscence Repair with Mesh and Bilateral Component Separation-Dr. Syd Maharaj   • [place] OPEN FIX INTER/SUBTROCH FX,IMPLNT Right 7/24/2017    Procedure: FEMUR INTRAMEDULLARY NAILING/RODDING;  Surgeon: Huan Weiss MD;  Location: [facility];  Service: Orthopedics   • TOTAL ABDOMINAL HYSTERECTOMY  1970s   • TOTAL KNEE ARTHROPLASTY Right 4/29/2019    Procedure: RT TOTAL KNEE ARTHROPLASTY WITH NAVIGATION;  Surgeon: Bolivar Glover MD;  Location: [facility];  Service: Orthopedics       PT Ortho     Row Name 06/13/19 1100       Subjective Comments    Subjective Comments  No right knee pain.  Ready to talk to MD about getting the other knee done...  -SI       Subjective Pain    Able to rate subjective pain?  yes  -SI       Right Lower Ext    Rt Knee Extension/Flexion AROM  -3 to 120  -SI       Left Lower Ext    Lt Knee Extension/Flexion AROM  -3 to 115  -SI       Gait/Stairs Assessment/Training    Comment (Gait/Stairs)  Ind gait with cnae  -SI      User Key  (r) = Recorded By, (t) = Taken By, (c) = Cosigned By    Initials Name Provider Type    Larissa Saravia, PTA Physical Therapy Assistant                      PT Assessment/Plan     Row Name 06/13/19 1145          PT Assessment    Assessment Comments  need to do 5 x sit to stand for the Goal next session.  HEP for right knee complete, and to do 4 ex given for left knee as she anticipates TKA in near future.  right knee ROM, gait , and strength all doing excellent  -SI       User Key  (r) = Recorded By, (t) = Taken By, (c) = Cosigned By    Initials Name Provider Type    Larissa Saravia PTA Physical Therapy Assistant            Exercises     Row Name 06/13/19 1100             Subjective Comments    Subjective Comments  No right knee pain.  Ready to talk to MD about getting the other knee done...  -SI         Subjective Pain    Able to rate subjective pain?   yes  -SI         Total Minutes    79320 - PT Therapeutic Exercise Minutes  45  -SI         Exercise 1    Exercise Name 1  heel slide with towel  -SI      Sets 1  1  -SI      Reps 1  10  -SI      Time 1  20  -SI         Exercise 2    Exercise Name 2  gastroc stretch with towel  -SI      Sets 2  1  -SI      Reps 2  5  -SI      Time 2  20  -SI         Exercise 3    Exercise Name 3  SAQ and LAQ 2.5  lbs  -SI      Sets 3  2  -SI      Reps 3  10  -SI      Time 3  5 sec  -SI         Exercise 4    Exercise Name 4  hook hip ABD blue  -SI      Sets 4  2  -SI      Reps 4  10  -SI         Exercise 5    Exercise Name 5  hookhip ADD  -SI      Sets 5  3  -SI      Reps 5  6  -SI      Time 5  3  -SI         Exercise 6    Exercise Name 6  HS curls green band  -SI      Sets 6  2  -SI      Reps 6  10  -SI      Time 6  5 sec  -SI         Exercise 7    Exercise Name 7  HS stretch long sitting  -SI      Sets 7  1  -SI      Reps 7  5  -SI      Time 7  5 sec  -SI         Exercise 8    Exercise Name 8  hip abd side 2.5 lbs  -SI      Sets 8  2  -SI      Reps 8  10  -SI         Exercise 9    Exercise Name 9  sit to stand to sit x 5  -SI         Exercise 10    Exercise Name 10  SL stance   -SI      Reps 10  2  -SI      Time 10  10  -SI         Exercise 11    Exercise Name 11  Ex for left knee (pre-op)..QS, SLR, HS curls green, HS stretches  -SI        User Key  (r) = Recorded By, (t) = Taken By, (c) = Cosigned By    Initials Name Provider Type    Larissa Saravia, PTA Physical Therapy Assistant                       PT OP Goals     Row Name 06/13/19 1100          PT Short Term Goals    STG 1  Pt will be independent with HEP to improve R knee ArOM and strength to allow full return to ADL's and ambulation without impairment.  -SI     STG 1 Progress  Progressing  -SI     STG 2  Pt will have R knee AROM 0 to 120.  -SI     STG 2 Progress  Partially Met  -SI     STG 3  Pt will have R knee Mmt grossly 4/5  -SI     STG 3 Progress  Met  -SI         Long Term Goals    LTG 1  Pt will ambulate with SC or no A.D with reciprocal gait with heel to toe pattern.  -SI     LTG 1 Progress  Met  -SI     LTG 2  Pt will have KOS > 75%  -SI     LTG 2 Progress  Met  -SI       User Key  (r) = Recorded By, (t) = Taken By, (c) = Cosigned By    Initials Name Provider Type    Larissa Saravia PTA Physical Therapy Assistant          Therapy Education  Given: HEP, Symptoms/condition management  Program: Reinforced  How Provided: Verbal, Demonstration, Written  Provided to: Patient  Level of Understanding: Teach back education performed              Time Calculation:   Start Time: 1100  Stop Time: 1145  Time Calculation (min): 45 min  Total Timed Code Minutes- PT: 45 minute(s)  Therapy Charges for Today     Code Description Service Date Service Provider Modifiers Qty    16250539375 HC PT THER PROC EA 15 MIN 6/13/2019 Larissa York PTA GP 3                    Larissa York PTA  6/13/2019

## 2019-06-18 ENCOUNTER — HOSPITAL ENCOUNTER (OUTPATIENT)
Dept: PHYSICAL THERAPY | Facility: HOSPITAL | Age: 79
Setting detail: THERAPIES SERIES
Discharge: HOME | End: 2019-06-18
Attending: ORTHOPAEDIC SURGERY

## 2019-06-18 PROCEDURE — 97110 THERAPEUTIC EXERCISES: CPT

## 2019-06-18 NOTE — THERAPY TREATMENT NOTE
Outpatient Physical Therapy Ortho Treatment Note  [facility]     Patient Name: Trice Melara  : 1940  MRN: [id number]  Today's Date: 2019      Visit Date: 2019    Visit Dx:  No diagnosis found.    Patient Active Problem List   Diagnosis   • Gastroesophageal reflux disease   • Knee pain   • Iron deficiency anemia   • Abnormal kidney function   • Acute renal failure (CMS/HCC)   • Arthritis of knee   • Arthritis   • Osteoarthrosis, localized, primary, knee   • Primary osteoarthritis of knees, bilateral   • Closed displaced intertrochanteric fracture of right femur (CMS/HCC)   • Fall   • Osteoporosis   • Primary osteoarthritis of right knee   • OA (osteoarthritis) of knee        Past Medical History:   Diagnosis Date   • Abdominal wall dehiscence 2016    Fascial Dehiscence   • Acute renal failure (CMS/HCC)    • Aspiration pneumonia (CMS/HCC)    • Colovaginal fistula    • Diverticulitis     with paracolonic abscess and colovesical fistula,    • Gastric ulcer    • GERD (gastroesophageal reflux disease)    • H/O Bowel perforation    • History of diverticular abscess    • History of GI bleed    • Osteoarthritis    • Peritonitis (CMS/HCC) 2016   • Septic shock (CMS/HCC)    • Small bowel anastomotic leak 2016        Past Surgical History:   Procedure Laterality Date   • BREAST CYST EXCISION     • COLON RESECTION  2016    Smal Bowel Resection -Dr. Syd Maharaj   • COLON SURGERY Left     with closure of fistula   • EXPLORATORY LAPAROTOMY  2015    With Extensive Adhesiolysis of over 1 hour to allow conduct of the operation and repair of small intestine-Dr. Syd Maharaj   • EXPLORATORY LAPAROTOMY  2016    Dr. Syd Maharaj   • INCISIONAL HERNIA REPAIR  2016    Incisional Hernia Repair with Fascial Dehiscence Repair with Mesh and Bilateral Component Separation-Dr. Syd Maharaj   • [place] OPEN FIX INTER/SUBTROCH FX,IMPLNT Right 2017     Procedure: FEMUR INTRAMEDULLARY NAILING/RODDING;  Surgeon: Huan Weiss MD;  Location: [facility];  Service: Orthopedics   • TOTAL ABDOMINAL HYSTERECTOMY  1970s   • TOTAL KNEE ARTHROPLASTY Right 4/29/2019    Procedure: RT TOTAL KNEE ARTHROPLASTY WITH NAVIGATION;  Surgeon: Bolivar Glover MD;  Location: [facility];  Service: Orthopedics                       PT Assessment/Plan     Row Name 06/18/19 1110          PT Assessment    Assessment Comments  Pt continues to progress with all activities, both closed chainand open chain.  AROM for R knee [phone].  Pt is most limited today by her pain in L knee.  -LP     Please refer to paper survey for additional self-reported information  Yes  -LP     Rehab Potential  Excellent  -LP     Patient/caregiver participated in establishment of treatment plan and goals  Yes  -LP     Patient would benefit from skilled therapy intervention  Yes  -LP        PT Plan    PT Frequency  2x/week  -LP     Predicted Duration of Therapy Intervention (Therapy Eval)  2- 3 weeks  -LP     PT Plan Comments  Continue per current POC  -LP       User Key  (r) = Recorded By, (t) = Taken By, (c) = Cosigned By    Initials Name Provider Type    LP Cora Dixon, PT Physical Therapist          Modalities     Row Name 06/18/19 1100             Ice    Patient reports will apply ice at home to involved area  Yes  -LP        User Key  (r) = Recorded By, (t) = Taken By, (c) = Cosigned By    Initials Name Provider Type    Cora Friedman, PT Physical Therapist        Exercises     Row Name 06/18/19 1100 06/18/19 1000          Subjective Comments    Subjective Comments  Increased pain in L knee due to rain.  -LP  --        Subjective Pain    Able to rate subjective pain?  yes  -LP  --     Pre-Treatment Pain Level  0  -LP  --     Post-Treatment Pain Level  0  -LP  --        Total Minutes    02956 - PT Therapeutic Exercise Minutes  --  40  -LP        Exercise 1    Exercise Name 1  --   heel slide with towel  -LP     Sets 1  --  1  -LP     Reps 1  --  10  -LP     Additional Comments  --  to 115  -LP        Exercise 3    Exercise Name 3  --  SAQ and LAQ 2.5  lbs  -LP     Sets 3  --  2  -LP     Reps 3  --  10  -LP     Additional Comments  --  1  -LP        Exercise 4    Exercise Name 4  --  hook hip ABD blue  -LP        Exercise 5    Exercise Name 5  --  hookhip ADD  -LP        Exercise 6    Exercise Name 6  --  HS curls green band  -LP        Exercise 7    Exercise Name 7  --  HS stretch long sitting  -LP        Exercise 8    Exercise Name 8  --  hip abd side 2.5 lbs  -LP     Sets 8  --  2  -LP     Reps 8  --  10  -LP     Additional Comments  --  1   -LP        Exercise 9    Exercise Name 9  sit to stand to sit x 5  -LP  --     Sets 9  1  -LP  --     Additional Comments  did not time- pushed with hands on thighs  -LP  --       User Key  (r) = Recorded By, (t) = Taken By, (c) = Cosigned By    Initials Name Provider Type    Cora Friedman, PT Physical Therapist                       PT OP Goals     Row Name 06/18/19 1100          PT Short Term Goals    STG 1  Pt will be independent with HEP to improve R knee ArOM and strength to allow full return to ADL's and ambulation without impairment.  -LP     STG 1 Progress  Progressing  -LP     STG 1 Progress Comments  needed some cuing for SLR  -LP     STG 2  Pt will have R knee AROM 0 to 120.  -LP     STG 2 Progress  Progressing  -LP     STG 2 Progress Comments  115-118 today  -LP     STG 3  Pt will have R knee Mmt grossly 4/5  -LP     STG 3 Progress  Met  -LP        Long Term Goals    LTG 1  Pt will ambulate with SC or no A.D with reciprocal gait with heel to toe pattern.  -LP     LTG 1 Progress  Met  -LP     LTG 1 Progress Comments  pt using SC and no AD around clinic  -LP     LTG 2  Pt will have KOS > 75%  -LP     LTG 2 Progress  Met  -LP     LTG 3  Pt will perform 5 sit to stand with no use of BUE.  -LP     LTG 3 Progress  Progressing  -LP     LTG 3  Progress Comments  able to do from standard chair height, but does still use mild push from BUE  -LP       User Key  (r) = Recorded By, (t) = Taken By, (c) = Cosigned By    Initials Name Provider Type    LP Cora Dixon, PT Physical Therapist          Therapy Education  Given: HEP, Symptoms/condition management, Fall prevention and home safety  Program: Reinforced  How Provided: Demonstration, Verbal  Provided to: Patient  Level of Understanding: Teach back education performed              Time Calculation:   Start Time: 1015  Stop Time: 1100  Time Calculation (min): 45 min  Therapy Charges for Today     Code Description Service Date Service Provider Modifiers Qty    56745766210 HC PT THER PROC EA 15 MIN 6/18/2019 Cora Dixon, PT GP 3                    Cora Dixon PT  6/18/2019

## 2019-06-27 ENCOUNTER — HOSPITAL ENCOUNTER (OUTPATIENT)
Dept: PHYSICAL THERAPY | Facility: HOSPITAL | Age: 79
Setting detail: THERAPIES SERIES
Discharge: HOME | End: 2019-06-27
Attending: ORTHOPAEDIC SURGERY

## 2019-06-27 DIAGNOSIS — M25.561 ACUTE PAIN OF RIGHT KNEE: ICD-10-CM

## 2019-06-27 DIAGNOSIS — M25.562 CHRONIC PAIN OF BOTH KNEES: ICD-10-CM

## 2019-06-27 DIAGNOSIS — Z96.651 S/P TKR (TOTAL KNEE REPLACEMENT), RIGHT: Primary | ICD-10-CM

## 2019-06-27 DIAGNOSIS — R29.898 DECREASED STRENGTH INVOLVING KNEE JOINT: ICD-10-CM

## 2019-06-27 DIAGNOSIS — M25.561 CHRONIC PAIN OF BOTH KNEES: ICD-10-CM

## 2019-06-27 DIAGNOSIS — G89.29 CHRONIC PAIN OF BOTH KNEES: ICD-10-CM

## 2019-06-27 DIAGNOSIS — M25.661 DECREASED RANGE OF MOTION (ROM) OF RIGHT KNEE: ICD-10-CM

## 2019-06-27 PROCEDURE — 97110 THERAPEUTIC EXERCISES: CPT

## 2019-06-27 NOTE — THERAPY DISCHARGE NOTE
Outpatient Physical Therapy Ortho Treatment Note/Discharge Summary  [facility]     Patient Name: Trice Melara  : 1940  MRN: [id number]  Today's Date: 2019      Visit Date: 2019    Visit Dx:    ICD-10-CM ICD-9-CM   1. S/P TKR (total knee replacement), right Z96.651 V43.65   2. Acute pain of right knee M25.561 719.46   3. Decreased range of motion (ROM) of right knee M25.661 719.56   4. Decreased strength involving knee joint R29.898 729.89   5. Chronic pain of both knees M25.561 719.46    M25.562 338.29    G89.29        Patient Active Problem List   Diagnosis   • Gastroesophageal reflux disease   • Knee pain   • Iron deficiency anemia   • Abnormal kidney function   • Acute renal failure (CMS/HCC)   • Arthritis of knee   • Arthritis   • Osteoarthrosis, localized, primary, knee   • Primary osteoarthritis of knees, bilateral   • Closed displaced intertrochanteric fracture of right femur (CMS/HCC)   • Fall   • Osteoporosis   • Primary osteoarthritis of right knee   • OA (osteoarthritis) of knee        Past Medical History:   Diagnosis Date   • Abdominal wall dehiscence 2016    Fascial Dehiscence   • Acute renal failure (CMS/HCC)    • Aspiration pneumonia (CMS/HCC)    • Colovaginal fistula    • Diverticulitis 2010    with paracolonic abscess and colovesical fistula, 2004   • Gastric ulcer    • GERD (gastroesophageal reflux disease)    • H/O Bowel perforation    • History of diverticular abscess    • History of GI bleed    • Osteoarthritis    • Peritonitis (CMS/HCC) 2016   • Septic shock (CMS/HCC)    • Small bowel anastomotic leak 2016        Past Surgical History:   Procedure Laterality Date   • BREAST CYST EXCISION     • COLON RESECTION  2016    Smal Bowel Resection -Dr. Syd Maharaj   • COLON SURGERY Left 2010    with closure of fistula   • EXPLORATORY LAPAROTOMY  2015    With Extensive Adhesiolysis of over 1 hour to allow conduct of the operation and  "repair of small intestine-Dr. Syd Maharaj   • EXPLORATORY LAPAROTOMY  01/08/2016    Dr. Syd Maharaj   • INCISIONAL HERNIA REPAIR  01/08/2016    Incisional Hernia Repair with Fascial Dehiscence Repair with Mesh and Bilateral Component Separation-Dr. Syd Maharaj   • SC OPEN FIX INTER/SUBTROCH FX,IMPLNT Right 7/24/2017    Procedure: FEMUR INTRAMEDULLARY NAILING/RODDING;  Surgeon: Huan Weiss MD;  Location: [facility];  Service: Orthopedics   • TOTAL ABDOMINAL HYSTERECTOMY  1970s   • TOTAL KNEE ARTHROPLASTY Right 4/29/2019    Procedure: RT TOTAL KNEE ARTHROPLASTY WITH NAVIGATION;  Surgeon: Bolivar Gloevr MD;  Location: [facility];  Service: Orthopedics       PT Ortho     Row Name 06/27/19 1100       Subjective Comments    Subjective Comments  Right knee is doing fabulous, left knee OA pain and will discuss with MD in July.  -SI       Subjective Pain    Able to rate subjective pain?  yes  -SI    Pre-Treatment Pain Level  0  -SI    Post-Treatment Pain Level  0  -SI    Subjective Pain Comment  Right knee 0 to 1 pain but left up to a 7  -SI       Posture/Observations    Genu varus  Left:;Moderate pt also tends to \"toe in\" with gait  -SI       Right Lower Ext    Rt Knee Extension/Flexion AROM  -2 to 120 active, 0 to 125 passive  -SI       Left Lower Ext    Lt Knee Extension/Flexion AROM  -4 to 112  -SI       MMT Right Lower Ext    Rt Hip Flexion MMT, Gross Movement  (4+/5) good plus  -SI    Rt Hip Extension MMT, Gross Movement  (4+/5) good plus  -SI    Rt Hip ABduction MMT, Gross Movement  (4+/5) good plus  -SI    Rt Hip ADduction MMT, Gross Movement  (4+/5) good plus  -SI    Rt Knee Extension MMT, Gross Movement  (4+/5) good plus  -SI    Rt Knee Flexion MMT, Gross Movement  (4+/5) good plus  -SI       Lower Extremity Flexibility    Hamstrings  Bilateral:;Mildly limited  -SI    Gastrocnemius  Bilateral:;Mildly limited  -SI       Gait/Stairs Assessment/Training    Comment (Gait/Stairs)  ind gait with " RWX when needs to carry things for work, or cane when oob  -SI      User Key  (r) = Recorded By, (t) = Taken By, (c) = Cosigned By    Initials Name Provider Type    Larissa Saravia PTA Physical Therapy Assistant                          Modalities     Row Name 06/27/19 1100             Ice    Patient reports will apply ice at home to involved area  Yes  -SI        User Key  (r) = Recorded By, (t) = Taken By, (c) = Cosigned By    Initials Name Provider Type    Larissa Saravia PTA Physical Therapy Assistant          Exercises     Row Name 06/27/19 1100             Subjective Comments    Subjective Comments  Right knee is doing fabulous, left knee OA pain and will discuss with MD in July.  -SI         Subjective Pain    Able to rate subjective pain?  yes  -SI      Pre-Treatment Pain Level  0  -SI      Post-Treatment Pain Level  0  -SI      Subjective Pain Comment  Right knee 0 to 1 pain but left up to a 7  -SI         Total Minutes    99485 - PT Therapeutic Exercise Minutes  40  -SI         Exercise 1    Exercise Name 1  heel slide with towel  -SI      Sets 1  1  -SI      Reps 1  10  -SI         Exercise 3    Exercise Name 3  SAQ and LAQ 2.5  lbs  -SI      Sets 3  2  -SI      Reps 3  10  -SI         Exercise 4    Exercise Name 4  hook hip ABD blue  -SI         Exercise 5    Exercise Name 5  hookhip ADD  -SI         Exercise 6    Exercise Name 6  HS curls green band  -SI         Exercise 7    Exercise Name 7  HS stretch long sitting  -SI         Exercise 8    Exercise Name 8  hip abd side 2.5 lbs  -SI      Sets 8  2  -SI      Reps 8  10  -SI        User Key  (r) = Recorded By, (t) = Taken By, (c) = Cosigned By    Initials Name Provider Type    Larissa Saravia PTA Physical Therapy Assistant                         PT OP Goals     Row Name 06/27/19 1100          PT Short Term Goals    STG 1  Pt will be independent with HEP to improve R knee ArOM and strength to allow full return to ADL's and ambulation without  impairment.  -SI     STG 1 Progress  Met  -SI     STG 2  Pt will have R knee AROM 0 to 120.  -SI     STG 2 Progress  Partially Met ext -2 active, 0 passive  -SI     STG 3  Pt will have R knee Mmt grossly 4/5  -SI     STG 3 Progress  Met  -SI        Long Term Goals    LTG 1  Pt will ambulate with SC or no A.D with reciprocal gait with heel to toe pattern.  -SI     LTG 1 Progress  Met  -SI     LTG 2  Pt will have KOS > 75%  -SI     LTG 2 Progress  Met  -SI     LTG 3  Pt will perform 5 sit to stand with no use of BUE.  -SI     LTG 3 Progress  Met  -SI       User Key  (r) = Recorded By, (t) = Taken By, (c) = Cosigned By    Initials Name Provider Type    Larissa Saravia PTA Physical Therapy Assistant          Therapy Education  Given: HEP, Symptoms/condition management  Program: Reinforced  How Provided: Verbal, Demonstration, Written  Provided to: Patient  Level of Understanding: Teach back education performed              Time Calculation:   Start Time: 1100  Stop Time: 1140  Time Calculation (min): 40 min  Total Timed Code Minutes- PT: 40 minute(s)  Therapy Charges for Today     Code Description Service Date Service Provider Modifiers Qty    [id number] HC PT THER PROC EA 15 MIN 6/27/2019 Larissa York PTA GP 3                OP PT Discharge Summary  Date of Discharge: 06/27/19  Reason for Discharge: Independent  Outcomes Achieved: Patient able to partially acheive established goals  Discharge Destination: Home with home program  Discharge Instructions/Additional Comments: HEP every other day right knee, elevate and ice.  4 exercises on left knee for pre-op every other day      Larissa York PTA  6/27/2019

## 2019-07-11 ENCOUNTER — OFFICE VISIT (OUTPATIENT)
Dept: ORTHOPEDIC SURGERY | Facility: CLINIC | Age: 79
End: 2019-07-11

## 2019-07-11 VITALS — HEIGHT: 62 IN | TEMPERATURE: 97.2 F | WEIGHT: 124 LBS | BODY MASS INDEX: 22.82 KG/M2

## 2019-07-11 DIAGNOSIS — Z96.651 STATUS POST RIGHT KNEE REPLACEMENT: Primary | ICD-10-CM

## 2019-07-11 DIAGNOSIS — M17.12 PRIMARY OSTEOARTHRITIS OF LEFT KNEE: ICD-10-CM

## 2019-07-11 PROCEDURE — 73562 X-RAY EXAM OF KNEE 3: CPT | Performed by: ORTHOPAEDIC SURGERY

## 2019-07-11 PROCEDURE — 99213 OFFICE O/P EST LOW 20 MIN: CPT | Performed by: ORTHOPAEDIC SURGERY

## 2019-07-11 RX ORDER — CEFAZOLIN SODIUM 2 G/100ML
2 INJECTION, SOLUTION INTRAVENOUS ONCE
Status: CANCELLED | OUTPATIENT
Start: 2019-09-11 | End: 2019-07-11

## 2019-07-11 RX ORDER — MELOXICAM 15 MG/1
15 TABLET ORAL ONCE
Status: CANCELLED | OUTPATIENT
Start: 2019-09-11 | End: 2019-07-11

## 2019-07-11 RX ORDER — PREGABALIN 75 MG/1
150 CAPSULE ORAL ONCE
Status: CANCELLED | OUTPATIENT
Start: 2019-09-11 | End: 2019-07-11

## 2019-07-11 NOTE — PROGRESS NOTES
Trice Melara : 1940 MRN: [id number] DATE: 2019    DIAGNOSIS: 8 week follow up right total knee  , left knee OA    SUBJECTIVE:Patient returns today for 8 week follow up of right total knee replacement. Patient reports doing well with no unusual complaints. Appears to be progressing appropriately. Has severe left knee oa -medial stabbing pain pain is much worse with activity.  Has worsened over the last month.  Limits ADL's.  Has had injections, anti-inflammatories, physical therapy without relief    OBJECTIVE:   Exam:. The incision is well healed. No sign of infection. Range of motion is measured at 3 to 120. The calf is soft and nontender with a negative Homans sign. Strength is progressing and the patient is ambulating appropriately.  Left knee shows about 12 to 15 degrees varus alignment she lacks 5 to 7 degrees of extension she walks with a cane.  There is crepitus with motion trace effusion    DIAGNOSTIC STUDIES  Xrays: 3 views of the right knee (AP, lateral, and sunrise) were ordered and reviewed for evaluation of recent knee replacement. They demonstrate a well positioned, well aligned knee replacement without complicating factors noted. In comparison with previous films there has been no change.  Left knee shows end-stage varus alignment with erosion of the medial tibial plateau bone    ASSESSMENT: 8 week status post right knee replacement.  End-stage left knee osteoarthritis she has failed the full complement of conservative measures    PLAN: 1) Continue with PT exercises as prescribed for both legs   2) Follow up with Pat for preop H&P visit for left total knee  Continuation of conservative management vs. TKA discussed.  The patient wishes to proceed with total knee replacement.  At this point the patient has failed the full compliment of conservative treatment and stating complete understanding of the risks/benefits/ anternatives wishes to proceed with surgical treatment.    Risk and  benefits of surgery were reviewed.  Including, but not limited to, blood clots or pulmonary embolism, anesthesia risk, infection, fracture, skin/leg numbness, persistent pain/crepitance/popping/catching, failure of the implant, need for future surgeries, hematoma, possible nerve or blood vessel injury, need for transfusion, and potential risk of stroke,heart attack or death, among others.  The patient understands and wishes to proceed.     It was explained that if tissue has been repaired or reconstructed, there is also an increased chance of failure which may require further management.  Following the completion of the discussion, the patient expressed understanding of this planned course of care, all their questions were answered and consent will be obtained preoperatively.    Operative Plan: left Smith and Nephtriston Oxinium Total Knee Replacement an overnight staywith home health rehab      Bolivar Glover MD  7/11/2019

## 2019-07-12 PROBLEM — M17.12 PRIMARY OSTEOARTHRITIS OF LEFT KNEE: Status: ACTIVE | Noted: 2019-07-12

## 2019-08-19 RX ORDER — OMEPRAZOLE 40 MG/1
40 CAPSULE, DELAYED RELEASE ORAL NIGHTLY
Qty: 90 CAPSULE | Refills: 1 | Status: SHIPPED | OUTPATIENT
Start: 2019-08-19 | End: 2019-11-05 | Stop reason: DRUGHIGH

## 2019-08-30 ENCOUNTER — APPOINTMENT (OUTPATIENT)
Dept: PREADMISSION TESTING | Facility: HOSPITAL | Age: 79
End: 2019-08-30

## 2019-08-30 VITALS
HEIGHT: 62 IN | DIASTOLIC BLOOD PRESSURE: 73 MMHG | WEIGHT: 127 LBS | SYSTOLIC BLOOD PRESSURE: 136 MMHG | TEMPERATURE: 97.1 F | BODY MASS INDEX: 23.37 KG/M2 | HEART RATE: 98 BPM | RESPIRATION RATE: 18 BRPM

## 2019-08-30 DIAGNOSIS — M17.12 PRIMARY OSTEOARTHRITIS OF LEFT KNEE: ICD-10-CM

## 2019-08-30 LAB
ANION GAP SERPL CALCULATED.3IONS-SCNC: 11.1 MMOL/L (ref 5–15)
BACTERIA UR QL AUTO: ABNORMAL /HPF
BILIRUB UR QL STRIP: NEGATIVE
BUN BLD-MCNC: 12 MG/DL (ref 8–23)
BUN/CREAT SERPL: 17.4 (ref 7–25)
CALCIUM SPEC-SCNC: 9.2 MG/DL (ref 8.6–10.5)
CHLORIDE SERPL-SCNC: 104 MMOL/L (ref 98–107)
CLARITY UR: CLEAR
CO2 SERPL-SCNC: 24.9 MMOL/L (ref 22–29)
COLOR UR: YELLOW
CREAT BLD-MCNC: 0.69 MG/DL (ref 0.57–1)
DEPRECATED RDW RBC AUTO: 46.2 FL (ref 37–54)
ERYTHROCYTE [DISTWIDTH] IN BLOOD BY AUTOMATED COUNT: 14.9 % (ref 12.3–15.4)
GFR SERPL CREATININE-BSD FRML MDRD: 82 ML/MIN/1.73
GLUCOSE BLD-MCNC: 101 MG/DL (ref 65–99)
GLUCOSE UR STRIP-MCNC: NEGATIVE MG/DL
HCT VFR BLD AUTO: 43.1 % (ref 34–46.6)
HGB BLD-MCNC: 13.3 G/DL (ref 12–15.9)
HGB UR QL STRIP.AUTO: NEGATIVE
HYALINE CASTS UR QL AUTO: ABNORMAL /LPF
KETONES UR QL STRIP: NEGATIVE
LEUKOCYTE ESTERASE UR QL STRIP.AUTO: ABNORMAL
MCH RBC QN AUTO: 26.4 PG (ref 26.6–33)
MCHC RBC AUTO-ENTMCNC: 30.9 G/DL (ref 31.5–35.7)
MCV RBC AUTO: 85.7 FL (ref 79–97)
NITRITE UR QL STRIP: POSITIVE
PH UR STRIP.AUTO: 7 [PH] (ref 5–8)
PLATELET # BLD AUTO: 186 10*3/MM3 (ref 140–450)
PMV BLD AUTO: 10.9 FL (ref 6–12)
POTASSIUM BLD-SCNC: 4 MMOL/L (ref 3.5–5.2)
PROT UR QL STRIP: NEGATIVE
RBC # BLD AUTO: 5.03 10*6/MM3 (ref 3.77–5.28)
RBC # UR: ABNORMAL /HPF
REF LAB TEST METHOD: ABNORMAL
SODIUM BLD-SCNC: 140 MMOL/L (ref 136–145)
SP GR UR STRIP: 1.01 (ref 1–1.03)
SQUAMOUS #/AREA URNS HPF: ABNORMAL /HPF
UROBILINOGEN UR QL STRIP: ABNORMAL
WBC NRBC COR # BLD: 7.1 10*3/MM3 (ref 3.4–10.8)
WBC UR QL AUTO: ABNORMAL /HPF

## 2019-08-30 PROCEDURE — 80048 BASIC METABOLIC PNL TOTAL CA: CPT | Performed by: ORTHOPAEDIC SURGERY

## 2019-08-30 PROCEDURE — 87088 URINE BACTERIA CULTURE: CPT | Performed by: ORTHOPAEDIC SURGERY

## 2019-08-30 PROCEDURE — 87186 SC STD MICRODIL/AGAR DIL: CPT | Performed by: ORTHOPAEDIC SURGERY

## 2019-08-30 PROCEDURE — 36415 COLL VENOUS BLD VENIPUNCTURE: CPT

## 2019-08-30 PROCEDURE — 87086 URINE CULTURE/COLONY COUNT: CPT | Performed by: ORTHOPAEDIC SURGERY

## 2019-08-30 PROCEDURE — 81001 URINALYSIS AUTO W/SCOPE: CPT | Performed by: ORTHOPAEDIC SURGERY

## 2019-08-30 PROCEDURE — 85027 COMPLETE CBC AUTOMATED: CPT | Performed by: ORTHOPAEDIC SURGERY

## 2019-08-30 ASSESSMENT — KOOS JR
KOOS JR SCORE: 47.487
KOOS JR SCORE: 16

## 2019-09-01 LAB — BACTERIA SPEC AEROBE CULT: ABNORMAL

## 2019-09-03 ENCOUNTER — TELEPHONE (OUTPATIENT)
Dept: ORTHOPEDIC SURGERY | Facility: CLINIC | Age: 79
End: 2019-09-03

## 2019-09-03 RX ORDER — SULFAMETHOXAZOLE AND TRIMETHOPRIM 800; 160 MG/1; MG/1
1 TABLET ORAL 2 TIMES DAILY
Qty: 14 TABLET | Refills: 0 | Status: ON HOLD | OUTPATIENT
Start: 2019-09-03 | End: 2019-09-11

## 2019-09-03 NOTE — TELEPHONE ENCOUNTER
----- Message from SARANYA Ernst sent at 9/3/2019  3:18 PM EDT -----  Please let patient know she does have bacteria in her urine, call in prescription for Bactrim DS 1 p.o. twice daily #14 0 refills

## 2019-09-03 NOTE — TELEPHONE ENCOUNTER
Preop urine culture is positive for E. coli.  Have discussed with the patient.  Have E prescribed a new prescription to Sally at [phone] Bactrim double strength, #14, directions are to take 1 p.o. twice daily per MLL

## 2019-09-05 ENCOUNTER — OFFICE VISIT (OUTPATIENT)
Dept: ORTHOPEDIC SURGERY | Facility: CLINIC | Age: 79
End: 2019-09-05

## 2019-09-05 VITALS — HEIGHT: 62 IN | BODY MASS INDEX: 23.37 KG/M2 | WEIGHT: 127 LBS

## 2019-09-05 DIAGNOSIS — M17.12 PRIMARY OSTEOARTHRITIS OF LEFT KNEE: Primary | ICD-10-CM

## 2019-09-05 PROCEDURE — 77077 JOINT SURVEY SINGLE VIEW: CPT | Performed by: ORTHOPAEDIC SURGERY

## 2019-09-05 PROCEDURE — S0260 H&P FOR SURGERY: HCPCS | Performed by: ORTHOPAEDIC SURGERY

## 2019-09-05 PROCEDURE — 73560 X-RAY EXAM OF KNEE 1 OR 2: CPT | Performed by: ORTHOPAEDIC SURGERY

## 2019-09-05 RX ORDER — MULTIPLE VITAMINS W/ MINERALS TAB 9MG-400MCG
1 TAB ORAL DAILY
COMMUNITY
End: 2019-09-12 | Stop reason: HOSPADM

## 2019-09-06 NOTE — H&P (VIEW-ONLY)
Patient: Trice Melara    Date of Admission: 9/11/19    YOB: 1940    Medical Record Number: [id number]    Admitting Physician: Dr. Bolivar Glover    Reason for Admission: End Stage Left Knee OA    History of Present Illness: 79 y.o. female presents with severe end stage knee osteoarthritis which has not been responsive to the full compliment of conservative measures. Despite conservative attempts, there is still severe, constant activity limiting pain. Given the severity of the pain, the patient has elected to proceed with knee replacement.    Allergies: No Known Allergies      Current Medications:  Home Medications:    Current Outpatient Medications on File Prior to Visit   Medication Sig   • Calcium Carbonate-Vitamin D (CALCIUM-VITAMIN D) 500-200 MG-UNIT per tablet Take 1 tablet by mouth 2 (Two) Times a Day With Meals.   • diphenhydrAMINE (BENADRYL) 25 mg capsule Take 25 mg by mouth 2 (Two) Times a Day As Needed.   • diphenoxylate-atropine (LOMOTIL) 2.5-0.025 MG per tablet Take 1 tablet by mouth 4 (Four) Times a Day As Needed.   • HYDROcodone-acetaminophen (NORCO) 7.5-325 MG per tablet 1-2 po q 4-6 hr prn pain (Patient taking differently: As Needed. 1/2 TAB AS NEEDED)   • Multiple Vitamins-Minerals (MULTIVITAMIN WITH MINERALS) tablet tablet Take 1 tablet by mouth Daily. Hold 1 week before surgery   • omeprazole (priLOSEC) 40 MG capsule Take 1 capsule by mouth Every Night.   • sulfamethoxazole-trimethoprim (BACTRIM DS) 800-160 MG per tablet Take 1 tablet by mouth 2 (Two) Times a Day for 7 days.   • meloxicam (MOBIC) 7.5 MG tablet Take 1 tablet by mouth Daily. (Patient taking differently: Take 7.5 mg by mouth Daily. HOLD 1 WEEK PRIOR TO SX)     Current Facility-Administered Medications on File Prior to Visit   Medication   • mupirocin (BACTROBAN) 2 % nasal ointment   • mupirocin (BACTROBAN) 2 % nasal ointment     PRN Meds:.    PMH:     Past Medical History:   Diagnosis Date   • Abdominal wall dehiscence  01/08/2016    Fascial Dehiscence   • Acute renal failure (CMS/HCC)    • Aspiration pneumonia (CMS/HCC)    • Colovaginal fistula 2004   • Diverticulitis 2010    with paracolonic abscess and colovesical fistula, 2004   • Frequent loose stools    • Gastric ulcer    • GERD (gastroesophageal reflux disease)    • H/O Bowel perforation 2004   • History of diverticular abscess    • History of GI bleed 2010   • Osteoarthritis    • Peritonitis (CMS/HCC) 01/2016   • Septic shock (CMS/HCC)    • Small bowel anastomotic leak 01/08/2016       PF/Surg/Soc Hx:     Past Surgical History:   Procedure Laterality Date   • BREAST CYST EXCISION     • COLON RESECTION  01/08/2016    Smal Bowel Resection -Dr. Syd Maharaj   • COLON SURGERY Left 2010    with closure of fistula   • EXPLORATORY LAPAROTOMY  12/31/2015    With Extensive Adhesiolysis of over 1 hour to allow conduct of the operation and repair of small intestine-Dr. Syd Maharaj   • EXPLORATORY LAPAROTOMY  01/08/2016    Dr. Syd Maharaj   • INCISIONAL HERNIA REPAIR  01/08/2016    Incisional Hernia Repair with Fascial Dehiscence Repair with Mesh and Bilateral Component Separation-Dr. Syd Maharaj   • MD OPEN FIX INTER/SUBTROCH FX,IMPLNT Right 7/24/2017    Procedure: FEMUR INTRAMEDULLARY NAILING/RODDING;  Surgeon: Huan Weiss MD;  Location: [facility] OR;  Service: Orthopedics   • TOTAL ABDOMINAL HYSTERECTOMY  1970s   • TOTAL KNEE ARTHROPLASTY Right 4/29/2019    Procedure: RT TOTAL KNEE ARTHROPLASTY WITH NAVIGATION;  Surgeon: Bolivar Glover MD;  Location: [facility] OR;  Service: Orthopedics        Social History     Occupational History     Comment: Part-time billing for a psychotherapist   Tobacco Use   • Smoking status: Current Some Day Smoker     Packs/day: 0.25     Years: 50.00     Pack years: 12.50     Types: Cigarettes   • Smokeless tobacco: Never Used   • Tobacco comment: 5-6 CIG DAY   Substance and Sexual Activity   • Alcohol use: Yes     Comment: 1-2 per  "evening   • Drug use: No   • Sexual activity: Defer      Social History     Social History Narrative   • Not on file        Family History   Problem Relation Age of Onset   • Dementia Mother    • Thyroid disease Mother    • Leukemia Father    • Thyroid disease Brother    • Malig Hyperthermia Neg Hx          Review of Systems:   A 14 point review of systems was performed, pertinent positives discussed above, all other systems are negative    Physical Exam: 79 y.o. female  Vital Signs :   Vitals:    09/05/19 1501   Weight: 57.6 kg (127 lb)   Height: 157.5 cm (62\")     General: Alert and Oriented x 3, No acute distress.  Psych: mood and affect appropriate; recent and remote memory intact  Eyes: conjunctiva clear; pupils equally round and reactive, sclera nonicteric  CV: no peripheral edema  Resp: normal respiratory effort  Skin: no rashes or wounds; normal turgor  Musculosketetal; pain and crepitance with knee range of motion  Vascular: palpable distal pulses    Xrays:  -2 views (AP and lateral) were reviewed demonstrating end-stage OA with bone on bone articulation.  -A full length AP xray was ordered today for purposes of operative alignment demonstrating end stage arthritic findings. There are no previous full length films for review.  Assessment:  End-stage Left knee osteoarthritis. Conservative measures have failed.      Plan:  The plan is to proceed with Left Total Knee Replacement. The patient voiced understanding of the risks, benefits, and alternative forms of treatment that were discussed with Dr Glover at the time of scheduling. Denies any history of DVT.  Creatinine 0.69.  Denies any history of narcotic abuse.  Started on Bactrim for UTI.      Patient is planning an overnight stay and will go home with home health.  Patient reports that she has Miralax, Colace and Zofran already at home from previous surgery in April 2019 and will not need new RX at time of discharge.     Torie Liang, " RN  9/5/2019

## 2019-09-06 NOTE — PROGRESS NOTES
Patient: Trice Melara    Date of Admission: 9/11/19    YOB: 1940    Medical Record Number: [id number]    Admitting Physician: Dr. Bolivar Glover    Reason for Admission: End Stage Left Knee OA    History of Present Illness: 79 y.o. female presents with severe end stage knee osteoarthritis which has not been responsive to the full compliment of conservative measures. Despite conservative attempts, there is still severe, constant activity limiting pain. Given the severity of the pain, the patient has elected to proceed with knee replacement.    Allergies: No Known Allergies      Current Medications:  Home Medications:    Current Outpatient Medications on File Prior to Visit   Medication Sig   • Calcium Carbonate-Vitamin D (CALCIUM-VITAMIN D) 500-200 MG-UNIT per tablet Take 1 tablet by mouth 2 (Two) Times a Day With Meals.   • diphenhydrAMINE (BENADRYL) 25 mg capsule Take 25 mg by mouth 2 (Two) Times a Day As Needed.   • diphenoxylate-atropine (LOMOTIL) 2.5-0.025 MG per tablet Take 1 tablet by mouth 4 (Four) Times a Day As Needed.   • HYDROcodone-acetaminophen (NORCO) 7.5-325 MG per tablet 1-2 po q 4-6 hr prn pain (Patient taking differently: As Needed. 1/2 TAB AS NEEDED)   • Multiple Vitamins-Minerals (MULTIVITAMIN WITH MINERALS) tablet tablet Take 1 tablet by mouth Daily. Hold 1 week before surgery   • omeprazole (priLOSEC) 40 MG capsule Take 1 capsule by mouth Every Night.   • sulfamethoxazole-trimethoprim (BACTRIM DS) 800-160 MG per tablet Take 1 tablet by mouth 2 (Two) Times a Day for 7 days.   • meloxicam (MOBIC) 7.5 MG tablet Take 1 tablet by mouth Daily. (Patient taking differently: Take 7.5 mg by mouth Daily. HOLD 1 WEEK PRIOR TO SX)     Current Facility-Administered Medications on File Prior to Visit   Medication   • mupirocin (BACTROBAN) 2 % nasal ointment   • mupirocin (BACTROBAN) 2 % nasal ointment     PRN Meds:.    PMH:     Past Medical History:   Diagnosis Date   • Abdominal wall dehiscence  01/08/2016    Fascial Dehiscence   • Acute renal failure (CMS/HCC)    • Aspiration pneumonia (CMS/HCC)    • Colovaginal fistula 2004   • Diverticulitis 2010    with paracolonic abscess and colovesical fistula, 2004   • Frequent loose stools    • Gastric ulcer    • GERD (gastroesophageal reflux disease)    • H/O Bowel perforation 2004   • History of diverticular abscess    • History of GI bleed 2010   • Osteoarthritis    • Peritonitis (CMS/HCC) 01/2016   • Septic shock (CMS/HCC)    • Small bowel anastomotic leak 01/08/2016       PF/Surg/Soc Hx:     Past Surgical History:   Procedure Laterality Date   • BREAST CYST EXCISION     • COLON RESECTION  01/08/2016    Smal Bowel Resection -Dr. Syd Maharaj   • COLON SURGERY Left 2010    with closure of fistula   • EXPLORATORY LAPAROTOMY  12/31/2015    With Extensive Adhesiolysis of over 1 hour to allow conduct of the operation and repair of small intestine-Dr. Syd Maharaj   • EXPLORATORY LAPAROTOMY  01/08/2016    Dr. Syd Maharaj   • INCISIONAL HERNIA REPAIR  01/08/2016    Incisional Hernia Repair with Fascial Dehiscence Repair with Mesh and Bilateral Component Separation-Dr. Syd Maharaj   • AR OPEN FIX INTER/SUBTROCH FX,IMPLNT Right 7/24/2017    Procedure: FEMUR INTRAMEDULLARY NAILING/RODDING;  Surgeon: Huan Weiss MD;  Location: [facility] OR;  Service: Orthopedics   • TOTAL ABDOMINAL HYSTERECTOMY  1970s   • TOTAL KNEE ARTHROPLASTY Right 4/29/2019    Procedure: RT TOTAL KNEE ARTHROPLASTY WITH NAVIGATION;  Surgeon: Bolivar Glover MD;  Location: [facility] OR;  Service: Orthopedics        Social History     Occupational History     Comment: Part-time billing for a psychotherapist   Tobacco Use   • Smoking status: Current Some Day Smoker     Packs/day: 0.25     Years: 50.00     Pack years: 12.50     Types: Cigarettes   • Smokeless tobacco: Never Used   • Tobacco comment: 5-6 CIG DAY   Substance and Sexual Activity   • Alcohol use: Yes     Comment: 1-2 per  "evening   • Drug use: No   • Sexual activity: Defer      Social History     Social History Narrative   • Not on file        Family History   Problem Relation Age of Onset   • Dementia Mother    • Thyroid disease Mother    • Leukemia Father    • Thyroid disease Brother    • Malig Hyperthermia Neg Hx          Review of Systems:   A 14 point review of systems was performed, pertinent positives discussed above, all other systems are negative    Physical Exam: 79 y.o. female  Vital Signs :   Vitals:    09/05/19 1501   Weight: 57.6 kg (127 lb)   Height: 157.5 cm (62\")     General: Alert and Oriented x 3, No acute distress.  Psych: mood and affect appropriate; recent and remote memory intact  Eyes: conjunctiva clear; pupils equally round and reactive, sclera nonicteric  CV: no peripheral edema  Resp: normal respiratory effort  Skin: no rashes or wounds; normal turgor  Musculosketetal; pain and crepitance with knee range of motion  Vascular: palpable distal pulses    Xrays:  -2 views (AP and lateral) were reviewed demonstrating end-stage OA with bone on bone articulation.  -A full length AP xray was ordered today for purposes of operative alignment demonstrating end stage arthritic findings. There are no previous full length films for review.  Assessment:  End-stage Left knee osteoarthritis. Conservative measures have failed.      Plan:  The plan is to proceed with Left Total Knee Replacement. The patient voiced understanding of the risks, benefits, and alternative forms of treatment that were discussed with Dr Glover at the time of scheduling. Denies any history of DVT.  Creatinine 0.69.  Denies any history of narcotic abuse.  Started on Bactrim for UTI.      Patient is planning an overnight stay and will go home with home health.  Patient reports that she has Miralax, Colace and Zofran already at home from previous surgery in April 2019 and will not need new RX at time of discharge.     Torie Liang, " RN  9/5/2019

## 2019-09-11 ENCOUNTER — APPOINTMENT (OUTPATIENT)
Dept: GENERAL RADIOLOGY | Facility: HOSPITAL | Age: 79
End: 2019-09-11

## 2019-09-11 ENCOUNTER — HOSPITAL ENCOUNTER (OUTPATIENT)
Facility: HOSPITAL | Age: 79
Discharge: HOME HEALTH | End: 2019-09-12
Attending: ORTHOPAEDIC SURGERY | Admitting: ORTHOPAEDIC SURGERY

## 2019-09-11 ENCOUNTER — ANESTHESIA (OUTPATIENT)
Dept: PERIOP | Facility: HOSPITAL | Age: 79
End: 2019-09-11

## 2019-09-11 ENCOUNTER — ANESTHESIA EVENT (OUTPATIENT)
Dept: PERIOP | Facility: HOSPITAL | Age: 79
End: 2019-09-11

## 2019-09-11 DIAGNOSIS — Z98.890 H/O: KNEE SURGERY: Primary | ICD-10-CM

## 2019-09-11 DIAGNOSIS — M17.12 PRIMARY OSTEOARTHRITIS OF LEFT KNEE: ICD-10-CM

## 2019-09-11 PROBLEM — Z96.651 STATUS POST RIGHT KNEE REPLACEMENT: Status: ACTIVE | Noted: 2019-09-11

## 2019-09-11 PROCEDURE — C1713 ANCHOR/SCREW BN/BN,TIS/BN: HCPCS | Performed by: ORTHOPAEDIC SURGERY

## 2019-09-11 PROCEDURE — 97162 PT EVAL MOD COMPLEX 30 MIN: CPT

## 2019-09-11 PROCEDURE — C1776 JOINT DEVICE (IMPLANTABLE): HCPCS | Performed by: ORTHOPAEDIC SURGERY

## 2019-09-11 PROCEDURE — A9270 NON-COVERED ITEM OR SERVICE: HCPCS | Performed by: ORTHOPAEDIC SURGERY

## 2019-09-11 PROCEDURE — 25010000002 VANCOMYCIN 750 MG RECONSTITUTED SOLUTION: Performed by: ORTHOPAEDIC SURGERY

## 2019-09-11 PROCEDURE — 25010000002 KETOROLAC TROMETHAMINE PER 15 MG: Performed by: ORTHOPAEDIC SURGERY

## 2019-09-11 PROCEDURE — 63710000001 MUPIROCIN 2 % OINTMENT: Performed by: ORTHOPAEDIC SURGERY

## 2019-09-11 PROCEDURE — C9290 INJ, BUPIVACAINE LIPOSOME: HCPCS | Performed by: ORTHOPAEDIC SURGERY

## 2019-09-11 PROCEDURE — 25010000002 PHENYLEPHRINE PER 1 ML: Performed by: NURSE ANESTHETIST, CERTIFIED REGISTERED

## 2019-09-11 PROCEDURE — 73560 X-RAY EXAM OF KNEE 1 OR 2: CPT

## 2019-09-11 PROCEDURE — 25010000002 FENTANYL CITRATE (PF) 100 MCG/2ML SOLUTION: Performed by: NURSE ANESTHETIST, CERTIFIED REGISTERED

## 2019-09-11 PROCEDURE — 97110 THERAPEUTIC EXERCISES: CPT

## 2019-09-11 PROCEDURE — 63710000001 PREGABALIN 75 MG CAPSULE: Performed by: ORTHOPAEDIC SURGERY

## 2019-09-11 PROCEDURE — 63710000001 MELOXICAM 15 MG TABLET: Performed by: ORTHOPAEDIC SURGERY

## 2019-09-11 PROCEDURE — 27447 TOTAL KNEE ARTHROPLASTY: CPT | Performed by: ORTHOPAEDIC SURGERY

## 2019-09-11 PROCEDURE — 25010000003 CEFAZOLIN IN DEXTROSE 2-4 GM/100ML-% SOLUTION: Performed by: ORTHOPAEDIC SURGERY

## 2019-09-11 PROCEDURE — 25010000002 ONDANSETRON PER 1 MG: Performed by: NURSE ANESTHETIST, CERTIFIED REGISTERED

## 2019-09-11 PROCEDURE — 25010000003 BUPIVACAINE LIPOSOME 1.3 % SUSPENSION 20 ML VIAL: Performed by: ORTHOPAEDIC SURGERY

## 2019-09-11 PROCEDURE — 25010000002 DEXAMETHASONE PER 1 MG: Performed by: NURSE ANESTHETIST, CERTIFIED REGISTERED

## 2019-09-11 PROCEDURE — 63710000001 MASTISOL LIQUID: Performed by: ORTHOPAEDIC SURGERY

## 2019-09-11 PROCEDURE — 25010000002 PROPOFOL 10 MG/ML EMULSION: Performed by: NURSE ANESTHETIST, CERTIFIED REGISTERED

## 2019-09-11 PROCEDURE — 25010000002 ONDANSETRON PER 1 MG: Performed by: ORTHOPAEDIC SURGERY

## 2019-09-11 PROCEDURE — 25010000002 KETOROLAC TROMETHAMINE PER 15 MG

## 2019-09-11 DEVICE — IMPLANTABLE DEVICE: Type: IMPLANTABLE DEVICE | Site: KNEE | Status: FUNCTIONAL

## 2019-09-11 DEVICE — LEGION PS OXINIUM FEMORAL SZ 4 LEFT
Type: IMPLANTABLE DEVICE | Site: KNEE | Status: FUNCTIONAL
Brand: LEGION

## 2019-09-11 DEVICE — PALACOS® R IS A FAST-CURING, RADIOPAQUE, POLY(METHYL METHACRYLATE)-BASED BONE CEMENT.PALACOS ® R CONTAINS THE X-RAY CONTRAST MEDIUM ZIRCONIUM DIOXIDE. TO IMPROVE VISIBILITY IN THE SURGICAL FIELD PALACOS ® R HAS BEEN COLOURED WITH CHLOROPHYLL (E141). THE BONE CEMENT IS PREPARED DIRECTLY BEFORE USE BY MIXING A POLYMER POWDER COMPONENT WITH A LIQUID MONOMER COMPONENT. A DUCTILE DOUGH FORMS WHICH CURES WITHIN A FEW MINUTES.
Type: IMPLANTABLE DEVICE | Site: KNEE | Status: FUNCTIONAL
Brand: PALACOS®

## 2019-09-11 DEVICE — GENESIS II BICONVEX PATELLA 26MM
Type: IMPLANTABLE DEVICE | Site: KNEE | Status: FUNCTIONAL
Brand: GENESIS II

## 2019-09-11 DEVICE — GENESIS II POSTERIOR STABILIZED                                    HIGH FLEXION INSERT SIZE 3-4 11MM
Type: IMPLANTABLE DEVICE | Site: KNEE | Status: FUNCTIONAL
Brand: GENESIS II

## 2019-09-11 DEVICE — GENESIS II NON-POROUS TIBIAL                                    BASEPLATE SIZE 3 LEFT
Type: IMPLANTABLE DEVICE | Site: KNEE | Status: FUNCTIONAL
Brand: GENESIS II

## 2019-09-11 RX ORDER — DIPHENHYDRAMINE HYDROCHLORIDE 50 MG/ML
12.5 INJECTION INTRAMUSCULAR; INTRAVENOUS
Status: DISCONTINUED | OUTPATIENT
Start: 2019-09-11 | End: 2019-09-11 | Stop reason: HOSPADM

## 2019-09-11 RX ORDER — MAGNESIUM HYDROXIDE 1200 MG/15ML
LIQUID ORAL AS NEEDED
Status: DISCONTINUED | OUTPATIENT
Start: 2019-09-11 | End: 2019-09-11 | Stop reason: HOSPADM

## 2019-09-11 RX ORDER — SODIUM CHLORIDE 0.9 % (FLUSH) 0.9 %
3 SYRINGE (ML) INJECTION EVERY 12 HOURS SCHEDULED
Status: DISCONTINUED | OUTPATIENT
Start: 2019-09-11 | End: 2019-09-11 | Stop reason: HOSPADM

## 2019-09-11 RX ORDER — SODIUM CHLORIDE 0.9 % (FLUSH) 0.9 %
3-10 SYRINGE (ML) INJECTION AS NEEDED
Status: DISCONTINUED | OUTPATIENT
Start: 2019-09-11 | End: 2019-09-11 | Stop reason: HOSPADM

## 2019-09-11 RX ORDER — PROMETHAZINE HYDROCHLORIDE 25 MG/ML
6.25 INJECTION, SOLUTION INTRAMUSCULAR; INTRAVENOUS
Status: DISCONTINUED | OUTPATIENT
Start: 2019-09-11 | End: 2019-09-11 | Stop reason: HOSPADM

## 2019-09-11 RX ORDER — OXYCODONE AND ACETAMINOPHEN 7.5; 325 MG/1; MG/1
1 TABLET ORAL ONCE AS NEEDED
Status: DISCONTINUED | OUTPATIENT
Start: 2019-09-11 | End: 2019-09-11 | Stop reason: HOSPADM

## 2019-09-11 RX ORDER — ASPIRIN 325 MG
325 TABLET, DELAYED RELEASE (ENTERIC COATED) ORAL DAILY
Status: DISCONTINUED | OUTPATIENT
Start: 2019-09-12 | End: 2019-09-12 | Stop reason: HOSPADM

## 2019-09-11 RX ORDER — PROMETHAZINE HYDROCHLORIDE 25 MG/1
25 TABLET ORAL ONCE AS NEEDED
Status: DISCONTINUED | OUTPATIENT
Start: 2019-09-11 | End: 2019-09-11 | Stop reason: HOSPADM

## 2019-09-11 RX ORDER — TRANEXAMIC ACID 100 MG/ML
INJECTION, SOLUTION INTRAVENOUS AS NEEDED
Status: DISCONTINUED | OUTPATIENT
Start: 2019-09-11 | End: 2019-09-11 | Stop reason: SURG

## 2019-09-11 RX ORDER — FLUMAZENIL 0.1 MG/ML
0.2 INJECTION INTRAVENOUS AS NEEDED
Status: DISCONTINUED | OUTPATIENT
Start: 2019-09-11 | End: 2019-09-11 | Stop reason: HOSPADM

## 2019-09-11 RX ORDER — ONDANSETRON 2 MG/ML
4 INJECTION INTRAMUSCULAR; INTRAVENOUS EVERY 6 HOURS PRN
Status: DISCONTINUED | OUTPATIENT
Start: 2019-09-11 | End: 2019-09-12 | Stop reason: HOSPADM

## 2019-09-11 RX ORDER — NALOXONE HCL 0.4 MG/ML
0.2 VIAL (ML) INJECTION AS NEEDED
Status: DISCONTINUED | OUTPATIENT
Start: 2019-09-11 | End: 2019-09-11 | Stop reason: HOSPADM

## 2019-09-11 RX ORDER — ACETAMINOPHEN 325 MG/1
325 TABLET ORAL EVERY 4 HOURS PRN
Status: DISCONTINUED | OUTPATIENT
Start: 2019-09-11 | End: 2019-09-12 | Stop reason: HOSPADM

## 2019-09-11 RX ORDER — DOCUSATE SODIUM 100 MG/1
100 CAPSULE, LIQUID FILLED ORAL 2 TIMES DAILY
Status: DISCONTINUED | OUTPATIENT
Start: 2019-09-11 | End: 2019-09-12 | Stop reason: HOSPADM

## 2019-09-11 RX ORDER — BUPIVACAINE HYDROCHLORIDE 7.5 MG/ML
INJECTION, SOLUTION EPIDURAL; RETROBULBAR
Status: COMPLETED | OUTPATIENT
Start: 2019-09-11 | End: 2019-09-11

## 2019-09-11 RX ORDER — HYDROCODONE BITARTRATE AND ACETAMINOPHEN 7.5; 325 MG/1; MG/1
2 TABLET ORAL EVERY 4 HOURS PRN
Status: DISCONTINUED | OUTPATIENT
Start: 2019-09-11 | End: 2019-09-12 | Stop reason: HOSPADM

## 2019-09-11 RX ORDER — LABETALOL HYDROCHLORIDE 5 MG/ML
5 INJECTION, SOLUTION INTRAVENOUS
Status: DISCONTINUED | OUTPATIENT
Start: 2019-09-11 | End: 2019-09-11 | Stop reason: HOSPADM

## 2019-09-11 RX ORDER — FENTANYL CITRATE 50 UG/ML
50 INJECTION, SOLUTION INTRAMUSCULAR; INTRAVENOUS
Status: DISCONTINUED | OUTPATIENT
Start: 2019-09-11 | End: 2019-09-11 | Stop reason: HOSPADM

## 2019-09-11 RX ORDER — ONDANSETRON 2 MG/ML
4 INJECTION INTRAMUSCULAR; INTRAVENOUS ONCE AS NEEDED
Status: DISCONTINUED | OUTPATIENT
Start: 2019-09-11 | End: 2019-09-11 | Stop reason: HOSPADM

## 2019-09-11 RX ORDER — FAMOTIDINE 10 MG/ML
20 INJECTION, SOLUTION INTRAVENOUS ONCE
Status: COMPLETED | OUTPATIENT
Start: 2019-09-11 | End: 2019-09-11

## 2019-09-11 RX ORDER — PREGABALIN 75 MG/1
150 CAPSULE ORAL ONCE
Status: COMPLETED | OUTPATIENT
Start: 2019-09-11 | End: 2019-09-11

## 2019-09-11 RX ORDER — ACETAMINOPHEN 10 MG/ML
1000 INJECTION, SOLUTION INTRAVENOUS ONCE
Status: COMPLETED | OUTPATIENT
Start: 2019-09-11 | End: 2019-09-11

## 2019-09-11 RX ORDER — PROMETHAZINE HYDROCHLORIDE 25 MG/ML
12.5 INJECTION, SOLUTION INTRAMUSCULAR; INTRAVENOUS ONCE AS NEEDED
Status: DISCONTINUED | OUTPATIENT
Start: 2019-09-11 | End: 2019-09-11 | Stop reason: HOSPADM

## 2019-09-11 RX ORDER — MELOXICAM 7.5 MG/1
7.5 TABLET ORAL DAILY
Status: DISCONTINUED | OUTPATIENT
Start: 2019-09-12 | End: 2019-09-12 | Stop reason: HOSPADM

## 2019-09-11 RX ORDER — UREA 10 %
3 LOTION (ML) TOPICAL NIGHTLY PRN
Status: DISCONTINUED | OUTPATIENT
Start: 2019-09-11 | End: 2019-09-12 | Stop reason: HOSPADM

## 2019-09-11 RX ORDER — DIPHENHYDRAMINE HCL 25 MG
25 CAPSULE ORAL
Status: DISCONTINUED | OUTPATIENT
Start: 2019-09-11 | End: 2019-09-11 | Stop reason: HOSPADM

## 2019-09-11 RX ORDER — HYDRALAZINE HYDROCHLORIDE 20 MG/ML
5 INJECTION INTRAMUSCULAR; INTRAVENOUS
Status: DISCONTINUED | OUTPATIENT
Start: 2019-09-11 | End: 2019-09-11 | Stop reason: HOSPADM

## 2019-09-11 RX ORDER — ONDANSETRON 4 MG/1
4 TABLET, FILM COATED ORAL EVERY 6 HOURS PRN
Status: DISCONTINUED | OUTPATIENT
Start: 2019-09-11 | End: 2019-09-12 | Stop reason: HOSPADM

## 2019-09-11 RX ORDER — EPHEDRINE SULFATE 50 MG/ML
INJECTION, SOLUTION INTRAVENOUS AS NEEDED
Status: DISCONTINUED | OUTPATIENT
Start: 2019-09-11 | End: 2019-09-11 | Stop reason: SURG

## 2019-09-11 RX ORDER — WOUND DRESSING ADHESIVE - LIQUID
LIQUID MISCELLANEOUS AS NEEDED
Status: DISCONTINUED | OUTPATIENT
Start: 2019-09-11 | End: 2019-09-11 | Stop reason: HOSPADM

## 2019-09-11 RX ORDER — PROMETHAZINE HYDROCHLORIDE 25 MG/1
25 SUPPOSITORY RECTAL ONCE AS NEEDED
Status: DISCONTINUED | OUTPATIENT
Start: 2019-09-11 | End: 2019-09-11 | Stop reason: HOSPADM

## 2019-09-11 RX ORDER — LIDOCAINE HYDROCHLORIDE 10 MG/ML
0.5 INJECTION, SOLUTION EPIDURAL; INFILTRATION; INTRACAUDAL; PERINEURAL ONCE AS NEEDED
Status: DISCONTINUED | OUTPATIENT
Start: 2019-09-11 | End: 2019-09-11 | Stop reason: HOSPADM

## 2019-09-11 RX ORDER — HYDROCODONE BITARTRATE AND ACETAMINOPHEN 7.5; 325 MG/1; MG/1
1 TABLET ORAL EVERY 4 HOURS PRN
Status: DISCONTINUED | OUTPATIENT
Start: 2019-09-11 | End: 2019-09-12 | Stop reason: HOSPADM

## 2019-09-11 RX ORDER — ONDANSETRON 2 MG/ML
INJECTION INTRAMUSCULAR; INTRAVENOUS AS NEEDED
Status: DISCONTINUED | OUTPATIENT
Start: 2019-09-11 | End: 2019-09-11 | Stop reason: SURG

## 2019-09-11 RX ORDER — KETOROLAC TROMETHAMINE 15 MG/ML
INJECTION, SOLUTION INTRAMUSCULAR; INTRAVENOUS
Status: COMPLETED
Start: 2019-09-11 | End: 2019-09-11

## 2019-09-11 RX ORDER — CEFAZOLIN SODIUM 2 G/100ML
2 INJECTION, SOLUTION INTRAVENOUS ONCE
Status: COMPLETED | OUTPATIENT
Start: 2019-09-11 | End: 2019-09-11

## 2019-09-11 RX ORDER — PANTOPRAZOLE SODIUM 40 MG/1
40 TABLET, DELAYED RELEASE ORAL
Status: DISCONTINUED | OUTPATIENT
Start: 2019-09-12 | End: 2019-09-12 | Stop reason: HOSPADM

## 2019-09-11 RX ORDER — ACETAMINOPHEN 325 MG/1
650 TABLET ORAL ONCE AS NEEDED
Status: DISCONTINUED | OUTPATIENT
Start: 2019-09-11 | End: 2019-09-11 | Stop reason: HOSPADM

## 2019-09-11 RX ORDER — DEXAMETHASONE SODIUM PHOSPHATE 4 MG/ML
INJECTION, SOLUTION INTRA-ARTICULAR; INTRALESIONAL; INTRAMUSCULAR; INTRAVENOUS; SOFT TISSUE AS NEEDED
Status: DISCONTINUED | OUTPATIENT
Start: 2019-09-11 | End: 2019-09-11 | Stop reason: SURG

## 2019-09-11 RX ORDER — MELOXICAM 15 MG/1
15 TABLET ORAL ONCE
Status: COMPLETED | OUTPATIENT
Start: 2019-09-11 | End: 2019-09-11

## 2019-09-11 RX ORDER — FENTANYL CITRATE 50 UG/ML
INJECTION, SOLUTION INTRAMUSCULAR; INTRAVENOUS AS NEEDED
Status: DISCONTINUED | OUTPATIENT
Start: 2019-09-11 | End: 2019-09-11 | Stop reason: SURG

## 2019-09-11 RX ORDER — PROPOFOL 10 MG/ML
VIAL (ML) INTRAVENOUS CONTINUOUS PRN
Status: DISCONTINUED | OUTPATIENT
Start: 2019-09-11 | End: 2019-09-11 | Stop reason: SURG

## 2019-09-11 RX ORDER — EPHEDRINE SULFATE 50 MG/ML
5 INJECTION, SOLUTION INTRAVENOUS ONCE AS NEEDED
Status: DISCONTINUED | OUTPATIENT
Start: 2019-09-11 | End: 2019-09-11 | Stop reason: HOSPADM

## 2019-09-11 RX ORDER — HYDROMORPHONE HYDROCHLORIDE 1 MG/ML
0.5 INJECTION, SOLUTION INTRAMUSCULAR; INTRAVENOUS; SUBCUTANEOUS
Status: DISCONTINUED | OUTPATIENT
Start: 2019-09-11 | End: 2019-09-11 | Stop reason: HOSPADM

## 2019-09-11 RX ORDER — SODIUM CHLORIDE, SODIUM LACTATE, POTASSIUM CHLORIDE, CALCIUM CHLORIDE 600; 310; 30; 20 MG/100ML; MG/100ML; MG/100ML; MG/100ML
9 INJECTION, SOLUTION INTRAVENOUS CONTINUOUS
Status: DISCONTINUED | OUTPATIENT
Start: 2019-09-11 | End: 2019-09-11 | Stop reason: HOSPADM

## 2019-09-11 RX ORDER — KETOROLAC TROMETHAMINE 30 MG/ML
15 INJECTION, SOLUTION INTRAMUSCULAR; INTRAVENOUS EVERY 8 HOURS
Status: DISCONTINUED | OUTPATIENT
Start: 2019-09-11 | End: 2019-09-12 | Stop reason: HOSPADM

## 2019-09-11 RX ORDER — HYDROCODONE BITARTRATE AND ACETAMINOPHEN 7.5; 325 MG/1; MG/1
1 TABLET ORAL ONCE AS NEEDED
Status: DISCONTINUED | OUTPATIENT
Start: 2019-09-11 | End: 2019-09-11 | Stop reason: HOSPADM

## 2019-09-11 RX ADMIN — SODIUM CHLORIDE, POTASSIUM CHLORIDE, SODIUM LACTATE AND CALCIUM CHLORIDE 9 ML/HR: 600; 310; 30; 20 INJECTION, SOLUTION INTRAVENOUS at 06:45

## 2019-09-11 RX ADMIN — EPHEDRINE SULFATE 10 MG: 50 INJECTION INTRAMUSCULAR; INTRAVENOUS; SUBCUTANEOUS at 07:42

## 2019-09-11 RX ADMIN — MELOXICAM 15 MG: 15 TABLET ORAL at 06:16

## 2019-09-11 RX ADMIN — FENTANYL CITRATE 25 MCG: 50 INJECTION INTRAMUSCULAR; INTRAVENOUS at 06:59

## 2019-09-11 RX ADMIN — SODIUM CHLORIDE, POTASSIUM CHLORIDE, SODIUM LACTATE AND CALCIUM CHLORIDE 500 ML: 600; 310; 30; 20 INJECTION, SOLUTION INTRAVENOUS at 06:00

## 2019-09-11 RX ADMIN — EPHEDRINE SULFATE 5 MG: 50 INJECTION INTRAMUSCULAR; INTRAVENOUS; SUBCUTANEOUS at 07:20

## 2019-09-11 RX ADMIN — EPHEDRINE SULFATE 10 MG: 50 INJECTION INTRAMUSCULAR; INTRAVENOUS; SUBCUTANEOUS at 08:10

## 2019-09-11 RX ADMIN — KETOROLAC TROMETHAMINE 15 MG: 30 INJECTION, SOLUTION INTRAMUSCULAR at 18:15

## 2019-09-11 RX ADMIN — ONDANSETRON 4 MG: 2 INJECTION INTRAMUSCULAR; INTRAVENOUS at 08:24

## 2019-09-11 RX ADMIN — ONDANSETRON 4 MG: 2 INJECTION INTRAMUSCULAR; INTRAVENOUS at 14:54

## 2019-09-11 RX ADMIN — EPHEDRINE SULFATE 5 MG: 50 INJECTION INTRAMUSCULAR; INTRAVENOUS; SUBCUTANEOUS at 07:11

## 2019-09-11 RX ADMIN — BUPIVACAINE HYDROCHLORIDE 1.4 ML: 7.5 INJECTION, SOLUTION EPIDURAL; RETROBULBAR at 07:05

## 2019-09-11 RX ADMIN — PHENYLEPHRINE HYDROCHLORIDE 100 MCG: 10 INJECTION INTRAVENOUS at 07:42

## 2019-09-11 RX ADMIN — MUPIROCIN 1 APPLICATION: 20 OINTMENT TOPICAL at 20:18

## 2019-09-11 RX ADMIN — PHENYLEPHRINE HYDROCHLORIDE 100 MCG: 10 INJECTION INTRAVENOUS at 07:30

## 2019-09-11 RX ADMIN — PROPOFOL 25 MCG/KG/MIN: 10 INJECTION, EMULSION INTRAVENOUS at 07:08

## 2019-09-11 RX ADMIN — PHENYLEPHRINE HYDROCHLORIDE 100 MCG: 10 INJECTION INTRAVENOUS at 08:50

## 2019-09-11 RX ADMIN — DEXAMETHASONE SODIUM PHOSPHATE 8 MG: 4 INJECTION INTRA-ARTICULAR; INTRALESIONAL; INTRAMUSCULAR; INTRAVENOUS; SOFT TISSUE at 07:17

## 2019-09-11 RX ADMIN — PHENYLEPHRINE HYDROCHLORIDE 100 MCG: 10 INJECTION INTRAVENOUS at 08:22

## 2019-09-11 RX ADMIN — FAMOTIDINE 20 MG: 10 INJECTION, SOLUTION INTRAVENOUS at 06:46

## 2019-09-11 RX ADMIN — CEFAZOLIN SODIUM 2 G: 2 INJECTION, SOLUTION INTRAVENOUS at 06:54

## 2019-09-11 RX ADMIN — TRANEXAMIC ACID 1000 MG: 100 INJECTION, SOLUTION INTRAVENOUS at 08:06

## 2019-09-11 RX ADMIN — VANCOMYCIN HYDROCHLORIDE 750 MG: 750 INJECTION, POWDER, LYOPHILIZED, FOR SOLUTION INTRAVENOUS at 06:20

## 2019-09-11 RX ADMIN — KETOROLAC TROMETHAMINE 15 MG: 15 INJECTION, SOLUTION INTRAMUSCULAR; INTRAVENOUS at 09:32

## 2019-09-11 RX ADMIN — EPHEDRINE SULFATE 5 MG: 50 INJECTION INTRAMUSCULAR; INTRAVENOUS; SUBCUTANEOUS at 07:30

## 2019-09-11 RX ADMIN — PHENYLEPHRINE HYDROCHLORIDE 100 MCG: 10 INJECTION INTRAVENOUS at 07:20

## 2019-09-11 RX ADMIN — PREGABALIN 150 MG: 75 CAPSULE ORAL at 06:16

## 2019-09-11 RX ADMIN — PHENYLEPHRINE HYDROCHLORIDE 100 MCG: 10 INJECTION INTRAVENOUS at 07:11

## 2019-09-11 RX ADMIN — VANCOMYCIN HYDROCHLORIDE 750 MG: 750 INJECTION, POWDER, LYOPHILIZED, FOR SOLUTION INTRAVENOUS at 18:15

## 2019-09-11 RX ADMIN — PHENYLEPHRINE HYDROCHLORIDE 100 MCG: 10 INJECTION INTRAVENOUS at 07:58

## 2019-09-11 RX ADMIN — ACETAMINOPHEN 1000 MG: 10 INJECTION, SOLUTION INTRAVENOUS at 07:15

## 2019-09-11 RX ADMIN — SODIUM CHLORIDE, POTASSIUM CHLORIDE, SODIUM LACTATE AND CALCIUM CHLORIDE 9 ML/HR: 600; 310; 30; 20 INJECTION, SOLUTION INTRAVENOUS at 09:35

## 2019-09-11 RX ADMIN — PHENYLEPHRINE HYDROCHLORIDE 100 MCG: 10 INJECTION INTRAVENOUS at 08:10

## 2019-09-11 NOTE — PERIOPERATIVE NURSING NOTE
Patient completed Bactrim as ordered by EBONIE Glover - last dose 9/9/19- no additional orders pre-op.

## 2019-09-11 NOTE — DISCHARGE PLACEMENT REQUEST
"Trice Melara (79 y.o. Female)     Date of Birth Social Security Number Address Home Phone MRN    1940  [id number] [facility] [phone] [id number]    [employer] Status          [employer]        Admission Date Admission Type Admitting Provider Attending Provider Department, Room/Bed    9/11/19 Elective Bolivar Glover MD Brown, Reid B, MD [address], P885/1    Discharge Date Discharge Disposition Discharge Destination                       Attending Provider:  Bolivar Glover MD    Allergies:  No Known Allergies    Isolation:  None   Infection:  None   Code Status:  CPR    Ht:  157.5 cm (62\")   Wt:  55.8 kg (123 lb)    Admission Cmt:  None   Principal Problem:  Primary osteoarthritis of left knee [M17.12] More...                 Active Insurance as of 9/11/2019     Primary Coverage     Payor Plan Insurance Group Employer/Plan Group    MEDICARE MEDICARE A & B      Payor Plan Address Payor Plan Phone Number Payor Plan Fax Number Effective Dates    [address] [phone]  7/1/2003 - None Entered    [facility] [place] [address]       Subscriber Name Subscriber Birth Date Member ID       TRICE MELARA 1940 [id number]           Secondary Coverage     Payor Plan Insurance Group Employer/Plan Group    [facility] [id number]     Payor Plan Address Payor Plan Phone Number Payor Plan Fax Number Effective Dates    [address]   1/1/2017 - None Entered    [facility] [phone]       Subscriber Name Subscriber Birth Date Member ID       ESME MELARA 7/23/1937 [id number]                 Emergency Contacts      (Rel.) Home Phone Work Phone Mobile Phone    MelaraKemnola (Son) [phone] -- --    ESME MELARA (Spouse) [phone] -- --        "

## 2019-09-11 NOTE — PLAN OF CARE
Problem: Patient Care Overview  Goal: Plan of Care Review  Outcome: Ongoing (interventions implemented as appropriate)   09/11/19 4853   Coping/Psychosocial   Plan of Care Reviewed With patient   OTHER   Outcome Summary s/p L TKR, pt did very well this afternoon of surgery, she was able to walk 75 ft in [place] with rwx with CGA, verbal cues for sequencing, pt perf ex without difficulty, able to SLR independently, pt is familiar with post op protocol as she had her R knee replaced recently. Pt to go home with assist and home health tomorrow.

## 2019-09-11 NOTE — PLAN OF CARE
Problem: Patient Care Overview  Goal: Plan of Care Review  Outcome: Ongoing (interventions implemented as appropriate)   09/11/19 1720   Coping/Psychosocial   Plan of Care Reviewed With patient   OTHER   Outcome Summary Arrived from PACU @ 1010 s/p COURTNEY. JOYCE. NVI. Dressing CDI. Spinal block still working for pain control. Worked with PT, up in chair. Ambulating with walker and assist x1. Voiding per BRP, some incont d/t spinal. Denies pain. Verbalized understanding of all education. Plans to D/C home with HH when ready. Will cont to monitor.   Plan of Care Review   Progress improving

## 2019-09-11 NOTE — ANESTHESIA POSTPROCEDURE EVALUATION
Patient: Trice Melara    Procedure Summary     Date:  09/11/19 Room / Location:  [facility] OR [address] [place] [address] MAIN OR    Anesthesia Start:  0654 Anesthesia Stop:  0851    Procedure:  TOTAL KNEE ARTHROPLASTY (Left Knee) Diagnosis:       Primary osteoarthritis of left knee      (Primary osteoarthritis of left knee [M17.12])    Surgeon:  Bolivar Glover MD Provider:      Anesthesia Type:  spinal ASA Status:  3          Anesthesia Type: spinal  Last vitals  BP   158/84 (09/11/19 0900)   Temp   36.7 °C (98 °F) (09/11/19 0849)   Pulse   93 (09/11/19 0900)   Resp   16 (09/11/19 0900)     SpO2   97 % (09/11/19 0900)     Post Anesthesia Care and Evaluation    Patient location during evaluation: bedside  Patient participation: complete - patient participated  Level of consciousness: awake  Pain management: adequate  Airway patency: patent  Anesthetic complications: No anesthetic complications    Cardiovascular status: acceptable  Respiratory status: acceptable  Hydration status: acceptable

## 2019-09-11 NOTE — ANESTHESIA PROCEDURE NOTES
Spinal Block      Patient reassessed immediately prior to procedure    Patient location during procedure: OR  Start Time: 9/11/2019 7:01 AM  Stop Time: 9/11/2019 7:05 AM  Indication:at surgeon's request  Performed By  Anesthesiologist: Pankaj Knapp MD  Preanesthetic Checklist  Completed: patient identified, site marked, surgical consent, pre-op evaluation, timeout performed, IV checked, risks and benefits discussed and monitors and equipment checked  Spinal Block Prep:  Patient Position:sitting  Sterile Tech:cap, gloves, mask and sterile barriers  Prep:Chloraprep  Patient Monitoring:blood pressure monitoring, continuous pulse oximetry and EKG  Spinal Block Procedure  Approach:midline  Guidance:landmark technique  Location:L3-L4  Needle Type:Sprotte  Needle Gauge:24 G  Placement of Spinal needle event:cerebrospinal fluid aspirated  Paresthesia: no  Fluid Appearance:clear  Medications: bupivacaine PF (MARCAINE) 0.75 % injection, 1.4 mL  Med Administered at 9/11/2019 7:05 AM   Post Assessment  Patient Tolerance:patient tolerated the procedure well with no apparent complications  Complications no

## 2019-09-11 NOTE — THERAPY EVALUATION
Patient Name: Trice Melara  : 1940    MRN: [id number]                              Today's Date: 2019       Admit Date: 2019    Visit Dx:     ICD-10-CM ICD-9-CM   1. Primary osteoarthritis of left knee M17.12 715.16     Patient Active Problem List   Diagnosis   • Gastroesophageal reflux disease   • Knee pain   • Iron deficiency anemia   • Abnormal kidney function   • Acute renal failure (CMS/HCC)   • Arthritis of knee   • Arthritis   • Osteoarthrosis, localized, primary, knee   • Primary osteoarthritis of knees, bilateral   • Closed displaced intertrochanteric fracture of right femur (CMS/HCC)   • Fall   • Osteoporosis   • Primary osteoarthritis of right knee   • OA (osteoarthritis) of knee   • Primary osteoarthritis of left knee   • Status post right knee replacement     Past Medical History:   Diagnosis Date   • Abdominal wall dehiscence 2016    Fascial Dehiscence   • Acute renal failure (CMS/HCC)    • Aspiration pneumonia (CMS/HCC)    • Colovaginal fistula    • Diverticulitis     with paracolonic abscess and colovesical fistula,    • Frequent loose stools    • Gastric ulcer    • GERD (gastroesophageal reflux disease)    • H/O Bowel perforation    • History of diverticular abscess    • History of GI bleed    • Osteoarthritis    • Peritonitis (CMS/HCC) 2016   • Septic shock (CMS/[facility])    • Small bowel anastomotic leak 2016     Past Surgical History:   Procedure Laterality Date   • BREAST CYST EXCISION     • COLON RESECTION  2016    Smal Bowel Resection -Dr. Syd Maharaj   • COLON SURGERY Left     with closure of fistula   • EXPLORATORY LAPAROTOMY  2015    With Extensive Adhesiolysis of over 1 hour to allow conduct of the operation and repair of small intestine-Dr. Syd Maharaj   • EXPLORATORY LAPAROTOMY  2016    Dr. Syd Maharaj   • INCISIONAL HERNIA REPAIR  2016    Incisional Hernia Repair with Fascial Dehiscence Repair with  Mesh and Bilateral Component Separation-Dr. Syd Maharaj   • NC OPEN FIX INTER/SUBTROCH FX,IMPLNT Right 7/24/2017    Procedure: FEMUR INTRAMEDULLARY NAILING/RODDING;  Surgeon: Huan Weiss MD;  Location: [facility] OR;  Service: Orthopedics   • TOTAL ABDOMINAL HYSTERECTOMY  1970s   • TOTAL KNEE ARTHROPLASTY Right 4/29/2019    Procedure: RT TOTAL KNEE ARTHROPLASTY WITH NAVIGATION;  Surgeon: Bolivar Glover MD;  Location: [facility] OR;  Service: Orthopedics     General Information     Row Name 09/11/19 1343          PT Evaluation Time/Intention    Document Type  evaluation  -PC     Mode of Treatment  physical therapy  -     Row Name 09/11/19 1343          General Information    Prior Level of Function  independent:  -PC     Existing Precautions/Restrictions  fall  -PC     Barriers to Rehab  none identified  -     Row Name 09/11/19 1343          Cognitive Assessment/Intervention- PT/OT    Orientation Status (Cognition)  oriented x 4  -PC     Row Name 09/11/19 1343          Safety Issues, Functional Mobility    Safety Issues Affecting Function (Mobility)  sequencing abilities;insight into deficits/self awareness;safety precaution awareness  -     Impairments Affecting Function (Mobility)  strength;endurance/activity tolerance  -PC       User Key  (r) = Recorded By, (t) = Taken By, (c) = Cosigned By    Initials Name Provider Type    PC Diamond Prabhakar, PT Physical Therapist        Mobility     Row Name 09/11/19 1345          Bed Mobility Assessment/Treatment    Bed Mobility Assessment/Treatment  supine-sit  -     Supine-Sit [place] (Bed Mobility)  supervision  -     Row Name 09/11/19 1345          Sit-Stand Transfer    Sit-Stand [place] (Transfers)  supervision;verbal cues  -     Row Name 09/11/19 1344          Gait/Stairs Assessment/Training    Gait/Stairs Assessment/Training  gait/ambulation independence;gait/ambulation assistive device  -     [place] Level (Gait)  contact  guard;verbal cues;nonverbal cues (demo/gesture)  -PC     Assistive Device (Gait)  walker, front-wheeled  -PC     Distance in Feet (Gait)  75 ft  -PC     Pattern (Gait)  step-through  -PC     Deviations/Abnormal Patterns (Gait)  antalgic  -PC     Left Sided Gait Deviations  heel strike decreased  -PC       User Key  (r) = Recorded By, (t) = Taken By, (c) = Cosigned By    Initials Name Provider Type    PC Diamond Prabhakar, PT Physical Therapist        Obj/Interventions     Row Name 09/11/19 1347          General ROM    GENERAL ROM COMMENTS  WFL  -PC     Row Name 09/11/19 1347          MMT (Manual Muscle Testing)    General MMT Comments  WNL x L LE  -PC     Row Name 09/11/19 1347          Therapeutic Exercise    Sets/Reps (Therapeutic Exercise)  10 reps TKR protocol  -PC     Row Name 09/11/19 1347          Sensory Assessment/Intervention    Sensory General Assessment  -- intact to light touch but some deficits with proprioception  -PC       User Key  (r) = Recorded By, (t) = Taken By, (c) = Cosigned By    Initials Name Provider Type    PC Diamond Prabhakar, PT Physical Therapist        Goals/Plan     Row Name 09/11/19 1350          Transfer Goal 1 (PT)    Activity/Assistive Device (Transfer Goal 1, PT)  sit-to-stand/stand-to-sit  -PC     [place] Level/Cues Needed (Transfer Goal 1, PT)  supervision required  -PC     Time Frame (Transfer Goal 1, PT)  2 - 3 days  -PC     Row Name 09/11/19 1350          Gait Training Goal 1 (PT)    Activity/Assistive Device (Gait Training Goal 1, PT)  gait (walking locomotion);assistive device use;walker, rolling  -PC     [place] Level (Gait Training Goal 1, PT)  supervision required  -PC     Distance (Gait Goal 1, PT)  100 ft  -PC     Time Frame (Gait Training Goal 1, PT)  2 - 3 days  -PC     Row Name 09/11/19 1350          ROM Goal 1 (PT)    ROM Goal 1 (PT)  5-90  -PC     Time Frame (ROM Goal 1, PT)  2 - 3 days  -PC     Row Name 09/11/19 1350          Patient Education Goal (PT)     Activity (Patient Education Goal, PT)  HEP  -PC     [place]/Cues/Accuracy (Memory Goal 2, PT)  demonstrates adequately  -PC     Time Frame (Patient Education Goal, PT)  3 days  -PC       User Key  (r) = Recorded By, (t) = Taken By, (c) = Cosigned By    Initials Name Provider Type    PC Diamond Prabhakar, PT Physical Therapist        Clinical Impression     Row Name 09/11/19 1348          Pain Assessment    Additional Documentation  Pain Scale: Numbers Pre/Post-Treatment (Group)  -PC     Row Name 09/11/19 1348          Pain Scale: Numbers Pre/Post-Treatment    Pain Scale: Numbers, Pretreatment  1/10  -PC     Pain Scale: Numbers, Post-Treatment  1/10  -PC     Pain Location - Side  Left  -PC     Pain Location  knee  -PC     Pain Intervention(s)  Medication (See MAR);Repositioned  -PC     Row Name 09/11/19 1348          Plan of Care Review    Plan of Care Reviewed With  patient;daughter  -PC     Row Name 09/11/19 1348          Physical Therapy Clinical Impression    Criteria for Skilled Interventions Met (PT Clinical Impression)  yes  -PC     Rehab Potential (PT Clinical Summary)  good, to achieve stated therapy goals  -PC     Row Name 09/11/19 1348          Positioning and Restraints    Pre-Treatment Position  in bed  -PC     Post Treatment Position  bathroom  -PC     Bathroom  call light within reach;with family/caregiver;notified nsg  -PC       User Key  (r) = Recorded By, (t) = Taken By, (c) = Cosigned By    Initials Name Provider Type    PC Diamond Prabhakar, PT Physical Therapist        Outcome Measures     Row Name 09/11/19 1356          How much help from another person do you currently need...    Turning from your back to your side while in flat bed without using bedrails?  4  -PC     Moving from lying on back to sitting on the side of a flat bed without bedrails?  3  -PC     Moving to and from a bed to a chair (including a wheelchair)?  3  -PC     Standing up from a chair using your arms (e.g., wheelchair,  bedside chair)?  3  -PC     Climbing 3-5 steps with a railing?  2  -PC     To walk in hospital room?  3  -PC     AM-PAC 6 Clicks Score (PT)  18  -PC     Row Name 09/11/19 8136          Functional Assessment    Outcome Measure Options  AM-PAC 6 Clicks Basic Mobility (PT)  -PC       User Key  (r) = Recorded By, (t) = Taken By, (c) = Cosigned By    Initials Name Provider Type    PC Diamond Prabhakar PT Physical Therapist        Physical Therapy Education     Title: PT OT SLP Therapies (Done)     Topic: Physical Therapy (Done)     Point: Mobility training (Done)     Learning Progress Summary           Patient Acceptance, E,D, DU,NR by PC at 9/11/2019  1:53 PM                   Point: Home exercise program (Done)     Learning Progress Summary           Patient Acceptance, E,D, DU,NR by PC at 9/11/2019  1:53 PM                   Point: Body mechanics (Done)     Learning Progress Summary           Patient Acceptance, E,D, DU,NR by PC at 9/11/2019  1:53 PM                   Point: Precautions (Done)     Learning Progress Summary           Patient Acceptance, E,D, DU,NR by PC at 9/11/2019  1:53 PM                               User Key     Initials Effective Dates Name Provider Type Discipline     04/03/18 -  Diamond Prabhakar PT Physical Therapist PT              PT Recommendation and Plan     Outcome Summary/Treatment Plan (PT)  Anticipated Discharge Disposition (PT): home with home health, home with assist  Plan of Care Reviewed With: patient  Outcome Summary: s/p L TKR, pt did very well this afternoon of surgery, she was able to walk 75 ft in [place] with rwx with CGA, verbal cues for sequencing, pt perf ex without difficulty, able to SLR independently, pt is familiar with post op protocol as she had her R knee replaced recently. Pt to go home with assist and home health tomorrow.      Time Calculation:   PT Charges     Row Name 09/11/19 9386             Time Calculation    Start Time  1315  -PC      Stop Time  1340  -PC       Time Calculation (min)  25 min  -PC      PT Received On  09/11/19  -PC      PT - Next Appointment  09/12/19  -PC      PT Goal Re-Cert Due Date  09/18/19  -PC        User Key  (r) = Recorded By, (t) = Taken By, (c) = Cosigned By    Initials Name Provider Type    PC Diamond Prabhakar, PT Physical Therapist        Therapy Charges for Today     Code Description Service Date Service Provider Modifiers Qty    57990391134 HC PT EVAL MOD COMPLEXITY 2 9/11/2019 Diamond Prabhakar, PT GP 1    61231626729 HC PT THER PROC EA 15 MIN 9/11/2019 Diamond Prabhakar, PT GP 1    92209759457 HC PT THER SUPP EA 15 MIN 9/11/2019 Diamond Prabhakar, PT GP 1          PT G-Codes  Outcome Measure Options: AM-PAC 6 Clicks Basic Mobility (PT)  AM-PAC 6 Clicks Score (PT): 18    Diamond Prabhakar PT  9/11/2019

## 2019-09-11 NOTE — ANESTHESIA PREPROCEDURE EVALUATION
Anesthesia Evaluation     Patient summary reviewed and Nursing notes reviewed   no history of anesthetic complications:  NPO Solid Status: > 8 hours  NPO Liquid Status: > 2 hours           Airway   Mallampati: II  TM distance: >3 FB  Neck ROM: full  No difficulty expected  Dental - normal exam     Pulmonary     breath sounds clear to auscultation  (+) a smoker Current,   Cardiovascular   Exercise tolerance: good (4-7 METS)    Rhythm: regular  Rate: normal        Neuro/Psych  GI/Hepatic/Renal/Endo    (+)  GERD,      Musculoskeletal     Abdominal     Abdomen: soft.   Substance History      OB/GYN          Other   (+) arthritis                     Anesthesia Plan    ASA 3     spinal     intravenous induction   Anesthetic plan, all risks, benefits, and alternatives have been provided, discussed and informed consent has been obtained with: patient.    Plan discussed with CRNA.

## 2019-09-11 NOTE — OP NOTE
Name: Trice Melara  YOB: 1940    DATE OF SURGERY: 9/11/2019    PREOPERATIVE DIAGNOSIS: Left knee end-stage osteoarthritis    POSTOPERATIVE DIAGNOSIS: Left knee end-stage osteoarthritis    PROCEDURE PERFORMED: Left total knee replacement    SURGEON: Bolivar Glover M.D.    ASSISTANT: CAESAR NGUYỄN    IMPLANTS: [employer]:     Implant Name Type Inv. Item Serial No.  Lot No. LRB No. Used   CMT BONE PALACOS R HI/VISC 1X40 - [id number] Implant CMT BONE PALACOS R HI/VISC 1X40  [facility] [id number] Left 1   COMP FEM LEGION OXINIUM PS SZ4 LT - [id number] Implant COMP FEM LEGION OXINIUM PS SZ4 LT  [employer] [id number] Left 1   PAT GEN2 BICONVEX [id number] - [id number] Implant PAT GEN2 BICONVEX [id number]  [employer] [id number] Left 1   BASE TIB/KN GEN2 NONPOR TI SZ3 LT - [id number] Implant BASE TIB/KN GEN2 NONPOR TI SZ3 LT  [employer] [id number] Left 1   INSRT TIB FLX HI GEN2 PS SZ3TO4 11MM - [id number] Implant INSRT TIB FLX HI GEN2 PS SZ3TO4 11MM  [employer] [id number] Left 1       Estimated Blood Loss: 200cc  Specimens : none  Complications: none    DESCRIPTION OF PROCEDURE: The patient was taken to the operating room and placed in the supine position. A sequential compression device was carefully placed on the non-operative leg. Preoperative antibiotics were administered. Surgical time out was performed. After adequate induction of anesthesia, the leg was prepped and draped in the usual sterile fashion, exsanguinated with an Esmarch bandage and the tourniquet inflated to 250 mmHg. A midline incision was performed followed by a medial parapatellar arthrotomy. The patella was subluxed laterally.  A portion of the fat pad, ACL, and anterior horns of the meniscus were excised. The drill hole was placed in the distal femur and the canal was the irrigated and suctioned. The IM yi was placed and a 5 degree distal valgus cut was performed on the femur. The femur was  then sized with a sizing guide. The femoral cutting block was placed and all femoral cuts were performed. The proximal tibia was exposed. We used the extramedullary tibial cutting guide set for removal of 9mm of bone off the high side. The tibial cut was performed. The posterior horns of the menisci were excised. The posterior osteophytes were removed. Flexion extension blocks were then used to balance the knee. The tibial cut surface was then sized with the sizing templates and the tibial and femoral trial were then placed. The knee was placed in full extension and then the tibial tray rotation was then matched to the femoral rotation and marked.    Attention was then placed to the patella. The patella was noted to track centrally through range of motion. The patella was then sized with the trials. The thickness of the patella was then measured. The patella was resurfaced and the surrounding osteophytes were removed. The preoperative thickness was reproduced. The patella tracked centrally through range of motion.   At this point all trial components were removed, the knee was copiously irrigated with pulsed lavage, and the knee was injected with anesthetic cocktail solution. The cut surfaces were then dried with clean lap sponges, and the components were cemented tibia, followed by femur, then patella. The knee was held in full extension and all excess cement was removed. The knee was held still until the cement had completely hardened. We then placed the trial polyethylene spacer which resulted in full extension and excellent flexion-extension balance. We placed the final polyethylene spacer.   The knee was then copiously irrigated. The tourniquet was then released. There was excellent hemostasis. We placed a one-eighth inch Hemovac drain. We closed the knee in multiple layers in standard fashion. Sterile dressing were applied. At the end of the case, the sponge and needle counts were reported as being correct.  There were no known complications. The patient was then transported to the recovery room.      Bolivar Glover M.D.

## 2019-09-12 VITALS
DIASTOLIC BLOOD PRESSURE: 60 MMHG | RESPIRATION RATE: 16 BRPM | HEIGHT: 62 IN | TEMPERATURE: 97.2 F | OXYGEN SATURATION: 95 % | WEIGHT: 123 LBS | HEART RATE: 81 BPM | SYSTOLIC BLOOD PRESSURE: 111 MMHG | BODY MASS INDEX: 22.63 KG/M2

## 2019-09-12 LAB
HCT VFR BLD AUTO: 33.4 % (ref 34–46.6)
HGB BLD-MCNC: 10.4 G/DL (ref 12–15.9)

## 2019-09-12 PROCEDURE — 63710000001 PANTOPRAZOLE 40 MG TABLET DELAYED-RELEASE: Performed by: ORTHOPAEDIC SURGERY

## 2019-09-12 PROCEDURE — 85014 HEMATOCRIT: CPT | Performed by: ORTHOPAEDIC SURGERY

## 2019-09-12 PROCEDURE — A9270 NON-COVERED ITEM OR SERVICE: HCPCS | Performed by: ORTHOPAEDIC SURGERY

## 2019-09-12 PROCEDURE — 63710000001 HYDROCODONE-ACETAMINOPHEN 7.5-325 MG TABLET: Performed by: ORTHOPAEDIC SURGERY

## 2019-09-12 PROCEDURE — 97110 THERAPEUTIC EXERCISES: CPT

## 2019-09-12 PROCEDURE — 25010000002 KETOROLAC TROMETHAMINE PER 15 MG: Performed by: ORTHOPAEDIC SURGERY

## 2019-09-12 PROCEDURE — 85018 HEMOGLOBIN: CPT | Performed by: ORTHOPAEDIC SURGERY

## 2019-09-12 PROCEDURE — 25010000002 ONDANSETRON PER 1 MG: Performed by: ORTHOPAEDIC SURGERY

## 2019-09-12 PROCEDURE — 63710000001 MELOXICAM 7.5 MG TABLET: Performed by: ORTHOPAEDIC SURGERY

## 2019-09-12 PROCEDURE — 63710000001 ASPIRIN EC 325 MG TABLET DELAYED-RELEASE: Performed by: ORTHOPAEDIC SURGERY

## 2019-09-12 PROCEDURE — 97150 GROUP THERAPEUTIC PROCEDURES: CPT

## 2019-09-12 PROCEDURE — 63710000001 MUPIROCIN 2 % OINTMENT: Performed by: ORTHOPAEDIC SURGERY

## 2019-09-12 PROCEDURE — 99024 POSTOP FOLLOW-UP VISIT: CPT | Performed by: NURSE PRACTITIONER

## 2019-09-12 RX ORDER — ONDANSETRON 4 MG/1
4 TABLET, FILM COATED ORAL EVERY 6 HOURS PRN
Qty: 10 TABLET | Refills: 0 | Status: SHIPPED | OUTPATIENT
Start: 2019-09-12 | End: 2019-11-05

## 2019-09-12 RX ORDER — HYDROCODONE BITARTRATE AND ACETAMINOPHEN 7.5; 325 MG/1; MG/1
2 TABLET ORAL EVERY 4 HOURS PRN
Qty: 60 TABLET | Refills: 0 | Status: SHIPPED | OUTPATIENT
Start: 2019-09-12 | End: 2019-09-21

## 2019-09-12 RX ORDER — PSEUDOEPHEDRINE HCL 30 MG
100 TABLET ORAL 2 TIMES DAILY
Qty: 28 EACH | Refills: 0 | Status: SHIPPED | OUTPATIENT
Start: 2019-09-12 | End: 2019-09-26

## 2019-09-12 RX ADMIN — KETOROLAC TROMETHAMINE 15 MG: 30 INJECTION, SOLUTION INTRAMUSCULAR at 00:50

## 2019-09-12 RX ADMIN — ONDANSETRON 4 MG: 2 INJECTION INTRAMUSCULAR; INTRAVENOUS at 11:47

## 2019-09-12 RX ADMIN — MELOXICAM 7.5 MG: 7.5 TABLET ORAL at 08:19

## 2019-09-12 RX ADMIN — MUPIROCIN 1 APPLICATION: 20 OINTMENT TOPICAL at 08:20

## 2019-09-12 RX ADMIN — ASPIRIN 325 MG: 325 TABLET, COATED ORAL at 08:19

## 2019-09-12 RX ADMIN — HYDROCODONE BITARTRATE AND ACETAMINOPHEN 1 TABLET: 7.5; 325 TABLET ORAL at 08:20

## 2019-09-12 RX ADMIN — HYDROCODONE BITARTRATE AND ACETAMINOPHEN 1 TABLET: 7.5; 325 TABLET ORAL at 00:50

## 2019-09-12 RX ADMIN — PANTOPRAZOLE SODIUM 40 MG: 40 TABLET, DELAYED RELEASE ORAL at 06:14

## 2019-09-12 NOTE — PROGRESS NOTES
Continued Stay Note  [facility]     Patient Name: Trice Melara  MRN: [id number]  Today's Date: 9/12/2019    Admit Date: 9/11/2019    Discharge Plan     Row Name 09/12/19 1117       Plan    Plan  [facility]    Patient/Family in Agreement with Plan  yes    Plan Comments  Spoke with pt, verified correct information on facesheet and explained the role of CCP. Pt would like to d/c home with [facility], referral given to Jennyfer with [facility] who states they are able to accept. Plan will be to d/c home with [facility] and family support. No other needs identified.     Final Discharge Disposition Code  06 - home with home health care    Final Note  Pt to d/c home with [facility].        Discharge Codes    No documentation.       Expected Discharge Date and Time     Expected Discharge Date Expected Discharge Time    Sep 12, 2019             Zunilda Larios RN

## 2019-09-12 NOTE — DISCHARGE SUMMARY
Patient Name: Trice Melara  Patient YOB: 1940    Date of Admission:  9/11/2019  Date of Discharge:  9/12/2019  Discharge Diagnosis: TOTAL KNEE ARTHROPLASTY  Presenting Problem/History of Present Illness: Primary osteoarthritis of left knee [M17.12]  Status post right knee replacement [Z96.651]  Admitting Physician: Dr Bolivar Glover  Consults:   Consults     No orders found for last 30 day(s).          DETAILS OF HOSPITAL STAY:  Patient is a 79 y.o. female was admitted to the floor following the above procedure and underwent an uncomplicated hospital stay.  Patient did well with physical therapy and was ambulating well without problems.  On the day of discharge the wound was clean, dry and intact and calf was soft and non tender and Homans sign was negative.  Patient was tolerating  without problems.  Patient will be discharged home.    Condition on Discharge:  Stable    Vital Signs  Temp:  [96.3 °F (35.7 °C)-97.4 °F (36.3 °C)] 97.2 °F (36.2 °C)  Heart Rate:  [] 81  Resp:  [16] 16  BP: ()/(58-72) 111/60    LABS:      Admission on 09/11/2019   Component Date Value Ref Range Status   • Hemoglobin 09/12/2019 10.4* 12.0 - 15.9 g/dL Final   • Hematocrit 09/12/2019 33.4* 34.0 - 46.6 % Final       No results found.    Discharge Medications     Discharge Medications      New Medications      Instructions Start Date   aspirin 325 MG EC tablet   325 mg, Oral, 2 Times Daily      docusate sodium 100 MG capsule   100 mg, Oral, 2 Times Daily      ondansetron 4 MG tablet  Commonly known as:  ZOFRAN   4 mg, Oral, Every 6 Hours PRN      polyethylene glycol pack packet  Commonly known as:  MIRALAX   17 g, Oral, 2 Times Daily         Changes to Medications      Instructions Start Date   HYDROcodone-acetaminophen 7.5-325 MG per tablet  Commonly known as:  NORCO  What changed:    · how much to take  · how to take this  · when to take this  · reasons to take this  · additional instructions   2 tablets, Oral, Every  4 Hours PRN      meloxicam 7.5 MG tablet  Commonly known as:  MOBIC  What changed:  additional instructions   7.5 mg, Oral, Daily         Continue These Medications      Instructions Start Date   diphenhydrAMINE 25 mg capsule  Commonly known as:  BENADRYL   25 mg, Oral, 2 Times Daily PRN      diphenoxylate-atropine 2.5-0.025 MG per tablet  Commonly known as:  LOMOTIL   1 tablet, Oral, 4 Times Daily PRN      omeprazole 40 MG capsule  Commonly known as:  priLOSEC   40 mg, Oral, Nightly         Stop These Medications    calcium-vitamin D 500-200 MG-UNIT per tablet     multivitamin with minerals tablet tablet            Activity at Discharge:     Discharge Instructions: Patient is to continue with physical therapy exercises daily and continue working with the physical therapist as ordered. Patient may weight bear as tolerated. Apply ice regularly. Patient may ice for long periods of time as long as ice is not directly on the skin. Patient instructed on frequent calf pumping exercises.  Patient also instructed on incentive spirometer during hospitalization and encouraged to continue to use at home regularly.    Dressing: The dressing is designed to be left in place until you return to the office in 2 weeks.  The suction unit should stop functioning at 7 days and the green light will switch to yellow.  At that point the suction unit and tubing can be disconnected at the port closest to the dressing.  The suction unit and tubing may be discarded.  You may shower immediately upon return home, you will need to turn the pump off by depressing the orange button once and then you may disconnect the pump and tubing at the connection port.  After showering, shake off the excess water and reattach the tubing.  Restart the pump by depressing the orange button one time and you will notice the green light flashing again.  If the dressing becomes disloged or saturated it should be changed. Please refer to the [facility] information sheet if  you have any questions about the dressing.  If you have a home health nurse or therapist they can be contacted to assist with dressing change or repair. You may also call the [facility] dressing hotline for questions related to the dressing ([phone]). If there still other problems or questions related to the dressing despite these measures then you can contact Torie at our office [phone].  If for some reason the CHAZ dressing is removed, after 7 days the wound can be gently cleaned with antibacterial soap then allowed to dry and covered with a dry sterile dressing. The wound should be covered at all times except while showering.  Patient may change dressings daily and prn using sterile 4x4 and paper tape, and should call if any unusual drainage, redness or swelling.*  Follow up appointment in 2 weeks - patient to call the office at [phone] to schedule.  Patient will be discharged on aspirin 325mg BID x 2weeks, then daily x 4weeks    Discharge Diagnosis:    Patient Active Problem List   Diagnosis   • Gastroesophageal reflux disease   • Knee pain   • Iron deficiency anemia   • Abnormal kidney function   • Acute renal failure (CMS/HCC)   • Arthritis of knee   • Arthritis   • Osteoarthrosis, localized, primary, knee   • Primary osteoarthritis of knees, bilateral   • Closed displaced intertrochanteric fracture of right femur (CMS/HCC)   • Fall   • Osteoporosis   • Primary osteoarthritis of right knee   • OA (osteoarthritis) of knee   • Primary osteoarthritis of left knee   • Status post right knee replacement       Follow-up Appointments  Future Appointments   Date Time Provider Department Center   9/24/2019 10:10 AM Bolivar Glover MD [facility] None   10/31/2019 10:30 AM [facility] None   11/5/2019  1:15 PM Maia Whitney MD [facility] None     Additional Instructions for the Follow-ups that You Need to Schedule     Ambulatory Referral to Home Health   As directed      Face to Face Visit  Date:  9/12/2019    Follow-up provider for Plan of Care?:  I will be treating the patient on an ongoing basis.  Please send me the Plan of Care for signature.    Follow-up provider:  FELICIA GRIGGS [[id number]]    Reason/Clinical Findings:  tka    Describe mobility limitations that make leaving home difficult:  postop    Nursing/Therapeutic Services Requested:  Physical Therapy    Frequency:  1 Week 1                  SARANYA Winchester  09/12/19  9:19 AM

## 2019-09-12 NOTE — THERAPY TREATMENT NOTE
Patient Name: Trice Melara  : 1940    MRN: [id number]                              Today's Date: 2019       Admit Date: 2019    Visit Dx:     ICD-10-CM ICD-9-CM   1. H/O: knee surgery Z98.890 V45.89   2. Primary osteoarthritis of left knee M17.12 715.16     Patient Active Problem List   Diagnosis   • Gastroesophageal reflux disease   • Knee pain   • Iron deficiency anemia   • Abnormal kidney function   • Acute renal failure (CMS/HCC)   • Arthritis of knee   • Arthritis   • Osteoarthrosis, localized, primary, knee   • Primary osteoarthritis of knees, bilateral   • Closed displaced intertrochanteric fracture of right femur (CMS/HCC)   • Fall   • Osteoporosis   • Primary osteoarthritis of right knee   • OA (osteoarthritis) of knee   • Primary osteoarthritis of left knee   • Status post right knee replacement     Past Medical History:   Diagnosis Date   • Abdominal wall dehiscence 2016    Fascial Dehiscence   • Acute renal failure (CMS/HCC)    • Aspiration pneumonia (CMS/HCC)    • Colovaginal fistula    • Diverticulitis     with paracolonic abscess and colovesical fistula,    • Frequent loose stools    • Gastric ulcer    • GERD (gastroesophageal reflux disease)    • H/O Bowel perforation    • History of diverticular abscess    • History of GI bleed    • Osteoarthritis    • Peritonitis (CMS/HCC) 2016   • Septic shock (CMS/HCC)    • Small bowel anastomotic leak 2016     Past Surgical History:   Procedure Laterality Date   • BREAST CYST EXCISION     • COLON RESECTION  2016    Smal Bowel Resection -Dr. Syd Maharaj   • COLON SURGERY Left     with closure of fistula   • EXPLORATORY LAPAROTOMY  2015    With Extensive Adhesiolysis of over 1 hour to allow conduct of the operation and repair of small intestine-Dr. Syd Maharaj   • EXPLORATORY LAPAROTOMY  2016    Dr. Syd Maharaj   • INCISIONAL HERNIA REPAIR  2016    Incisional Hernia Repair  with Fascial Dehiscence Repair with Mesh and Bilateral Component Separation-Dr. Syd Maharaj   • MS OPEN FIX INTER/SUBTROCH FX,IMPLNT Right 7/24/2017    Procedure: FEMUR INTRAMEDULLARY NAILING/RODDING;  Surgeon: Huan Weiss MD;  Location: [facility];  Service: Orthopedics   • TOTAL ABDOMINAL HYSTERECTOMY  1970s   • TOTAL KNEE ARTHROPLASTY Right 4/29/2019    Procedure: RT TOTAL KNEE ARTHROPLASTY WITH NAVIGATION;  Surgeon: Bolivar Glover MD;  Location: [facility] OR;  Service: Orthopedics   • TOTAL KNEE ARTHROPLASTY Left 9/11/2019    Procedure: TOTAL KNEE ARTHROPLASTY;  Surgeon: Bolivar Glover MD;  Location: [facility];  Service: Orthopedics     General Information     Row Name 09/12/19 1332          PT Evaluation Time/Intention    Document Type  therapy note (daily note)  -     Mode of Treatment  group therapy;physical therapy  -     Row Name 09/12/19 1332          General Information    Existing Precautions/Restrictions  fall  -     Row Name 09/12/19 1332          Cognitive Assessment/Intervention- PT/OT    Orientation Status (Cognition)  oriented x 4  -       User Key  (r) = Recorded By, (t) = Taken By, (c) = Cosigned By    Initials Name Provider Type     Cornelia Gloria PTA Physical Therapy Assistant        Mobility     Row Name 09/12/19 1333          Sit-Stand Transfer    Sit-Stand [place] (Transfers)  supervision  -     Assistive Device (Sit-Stand Transfers)  walker, front-wheeled  -     Row Name 09/12/19 1333          Gait/Stairs Assessment/Training    [place] Level (Gait)  stand by assist  -     Assistive Device (Gait)  walker, front-wheeled  -     Distance in Feet (Gait)  130  -EH     Pattern (Gait)  step-through  -     Deviations/Abnormal Patterns (Gait)  antalgic  -EH     Left Sided Gait Deviations  heel strike decreased  -       User Key  (r) = Recorded By, (t) = Taken By, (c) = Cosigned By    Initials Name Provider Type     Cornelia Gloria PTA Physical  Therapy Assistant        Obj/Interventions     Row Name 09/12/19 1333          General ROM    GENERAL ROM COMMENTS  Left knee AROM 0/110  -EH     Row Name 09/12/19 1333          Therapeutic Exercise    Sets/Reps (Therapeutic Exercise)  Left knee TKR protocol [id number]  -       User Key  (r) = Recorded By, (t) = Taken By, (c) = Cosigned By    Initials Name Provider Type     Cornelia Gloria PTA Physical Therapy Assistant        Goals/Plan    No documentation.       Clinical Impression     Row Name 09/12/19 1334          Pain Scale: Numbers Pre/Post-Treatment    Pain Scale: Numbers, Pretreatment  1/10  -     Pain Location - Side  Left  -EH     Pain Location  knee  -     Pain Intervention(s)  Ambulation/increased activity;Repositioned  -     Row Name 09/12/19 1334          Plan of Care Review    Plan of Care Reviewed With  patient  -EH     Row Name 09/12/19 1334          Positioning and Restraints    Pre-Treatment Position  sitting in chair/recliner  -     Post Treatment Position  bed  -EH     In Bed  sitting EOB;call light within reach;encouraged to call for assist;with family/caregiver  -       User Key  (r) = Recorded By, (t) = Taken By, (c) = Cosigned By    Initials Name Provider Type     Cornelia Gloria PTA Physical Therapy Assistant        Outcome Measures     Row Name 09/12/19 1330          How much help from another person do you currently need...    Turning from your back to your side while in flat bed without using bedrails?  4  -EH     Moving from lying on back to sitting on the side of a flat bed without bedrails?  3  -EH     Moving to and from a bed to a chair (including a wheelchair)?  3  -EH     Standing up from a chair using your arms (e.g., wheelchair, bedside chair)?  3  -EH     Climbing 3-5 steps with a railing?  3  -EH     To walk in hospital room?  3  -EH     AM-PAC 6 Clicks Score (PT)  19  -       User Key  (r) = Recorded By, (t) = Taken By, (c) = Cosigned By    Initials Name Provider  Type     Cornelia Gloria PTA Physical Therapy Assistant        Physical Therapy Education     Title: PT OT SLP Therapies (Resolved)     Topic: Physical Therapy (Resolved)     Point: Mobility training (Resolved)     Learning Progress Summary           Patient Acceptance, E,D, DU,NR by  at 9/11/2019  1:53 PM                   Point: Home exercise program (Resolved)     Learning Progress Summary           Patient Acceptance, E,D, DU,NR by  at 9/11/2019  1:53 PM                   Point: Body mechanics (Resolved)     Learning Progress Summary           Patient Acceptance, E,D, DU,NR by  at 9/11/2019  1:53 PM                   Point: Precautions (Resolved)     Learning Progress Summary           Patient Acceptance, E,D, DU,NR by  at 9/11/2019  1:53 PM                               User Key     Initials Effective Dates Name Provider Type [facility] 04/03/18 -  Diamond Prabhakar PT Physical Therapist PT              PT Recommendation and Plan     Plan of Care Reviewed With: patient     Time Calculation:   PT Charges     Row Name 09/12/19 1332             Time Calculation    Start Time  1030  -      Stop Time  1119  -      Time Calculation (min)  49 min  -      PT Received On  09/12/19  -      PT - Next Appointment  09/13/19  -         Time Calculation- PT    Total Timed Code Minutes- PT  30 minute(s)  -        User Key  (r) = Recorded By, (t) = Taken By, (c) = Cosigned By    Initials Name Provider Type     Cornelia Gloria PTA Physical Therapy Assistant        Therapy Charges for Today     Code Description Service Date Service Provider Modifiers Qty    23778714913 HC PT THER PROC EA 15 MIN 9/12/2019 Cornelia Gloria PTA GP 1    73669634046 HC PT THER PROC GROUP 9/12/2019 Cornelia Gloria PTA GP 1          PT G-Codes  Outcome Measure Options: AM-PAC 6 Clicks Basic Mobility (PT)  AM-PAC 6 Clicks Score (PT): 19    Cornelia Gloria PTA  9/12/2019

## 2019-09-12 NOTE — DISCHARGE INSTRUCTIONS
Discharge Instructions: Patient is to continue with physical therapy exercises daily and continue working with the physical therapist as ordered. Patient may weight bear as tolerated. Apply ice regularly. Patient may ice for long periods of time as long as ice is not directly on the skin. Patient instructed on frequent calf pumping exercises.  Patient also instructed on incentive spirometer during hospitalization and encouraged to continue to use at home regularly.    Dressing: The dressing is designed to be left in place until you return to the office in 2 weeks.  The suction unit should stop functioning at 7 days and the green light will switch to yellow.  At that point the suction unit and tubing can be disconnected at the port closest to the dressing.  The suction unit and tubing may be discarded.  You may shower immediately upon return home, you will need to turn the pump off by depressing the orange button once and then you may disconnect the pump and tubing at the connection port.  After showering, shake off the excess water and reattach the tubing.  Restart the pump by depressing the orange button one time and you will notice the green light flashing again.  If the dressing becomes disloged or saturated it should be changed. Please refer to the [facility] information sheet if you have any questions about the dressing.  If you have a home health nurse or therapist they can be contacted to assist with dressing change or repair. You may also call the [facility] dressing hotline for questions related to the dressing (1-360.935.7527). If there still other problems or questions related to the dressing despite these measures then you can contact Torie at our office [phone].  If for some reason the CHAZ dressing is removed, after 7 days the wound can be gently cleaned with antibacterial soap then allowed to dry and covered with a dry sterile dressing. The wound should be covered at all times except while showering.   Patient may change dressings daily and prn using sterile 4x4 and paper tape, and should call if any unusual drainage, redness or swelling.*  Follow up appointment in 2 weeks - patient to call the office at [phone] to schedule.  Patient will be discharged on aspirin 325mg BID x 2weeks, then daily x 4weeks

## 2019-09-18 ENCOUNTER — TELEPHONE (OUTPATIENT)
Dept: ORTHOPEDIC SURGERY | Facility: CLINIC | Age: 79
End: 2019-09-18

## 2019-09-18 NOTE — TELEPHONE ENCOUNTER
Received a call from patient's home health nurse stating she noticed a little green drainage on the patient's CHAZ dressing.  She is not running a fever of other vitals are fine, however she said that area is tender to the touch.  Patient is scheduled for follow up on 9/24/2019.

## 2019-09-20 ENCOUNTER — TELEPHONE (OUTPATIENT)
Dept: ORTHOPEDIC SURGERY | Facility: CLINIC | Age: 79
End: 2019-09-20

## 2019-09-20 DIAGNOSIS — Z96.651 STATUS POST RIGHT KNEE REPLACEMENT: Primary | ICD-10-CM

## 2019-09-20 NOTE — TELEPHONE ENCOUNTER
Patient only has 1 visit left on 9/23 for home health physical therapy on her right knee. She does not start out-patient PT until 10/1. Does she need extension for another home health visit later next week?

## 2019-09-24 ENCOUNTER — OFFICE VISIT (OUTPATIENT)
Dept: ORTHOPEDIC SURGERY | Facility: CLINIC | Age: 79
End: 2019-09-24

## 2019-09-24 VITALS — WEIGHT: 123 LBS | HEIGHT: 62 IN | TEMPERATURE: 97.2 F | BODY MASS INDEX: 22.63 KG/M2

## 2019-09-24 DIAGNOSIS — Z96.652 STATUS POST LEFT KNEE REPLACEMENT: Primary | ICD-10-CM

## 2019-09-24 PROCEDURE — 99024 POSTOP FOLLOW-UP VISIT: CPT | Performed by: ORTHOPAEDIC SURGERY

## 2019-09-24 NOTE — PROGRESS NOTES
Trice Melara : 1940 MRN: [id number] DATE: 2019    DIAGNOSIS: 2 week follow up left total knee      SUBJECTIVE:Patient returns today for 2 week follow up of left total knee replacement. Patient reports doing well with no unusual complaints. Appears to be progressing appropriately.    OBJECTIVE:   Exam:. The incision is healing appropriately. No sign of infection. Range of motion is progressing as expected. The calf is soft and nontender with a negative Homans sign.    ASSESSMENT: 2 week status post left knee replacement.    PLAN: 1) Staples removed and steri strips applied   2) Order given for PT   3) Discontinue KENJI hose   4) Continue ice PRN   5) aspirin 325 mg orally every day for 1 month   6) Follow up in 6 weeks with repeat Xrays of left knee (3views)    Bolivar Glover MD  2019

## 2019-10-01 ENCOUNTER — HOSPITAL ENCOUNTER (OUTPATIENT)
Dept: PHYSICAL THERAPY | Facility: HOSPITAL | Age: 79
Setting detail: THERAPIES SERIES
Discharge: HOME | End: 2019-10-01

## 2019-10-01 DIAGNOSIS — Z96.652 STATUS POST TOTAL KNEE REPLACEMENT, LEFT: Primary | ICD-10-CM

## 2019-10-01 DIAGNOSIS — R26.2 DIFFICULTY WALKING: ICD-10-CM

## 2019-10-01 DIAGNOSIS — M25.562 CHRONIC PAIN OF LEFT KNEE: ICD-10-CM

## 2019-10-01 DIAGNOSIS — G89.29 CHRONIC PAIN OF LEFT KNEE: ICD-10-CM

## 2019-10-01 PROCEDURE — 97161 PT EVAL LOW COMPLEX 20 MIN: CPT

## 2019-10-01 PROCEDURE — 97110 THERAPEUTIC EXERCISES: CPT

## 2019-10-01 NOTE — THERAPY EVALUATION
Outpatient Physical Therapy Ortho Initial Evaluation  [facility]     Patient Name: Trice Melara  : 1940  MRN: [id number]  Today's Date: 10/1/2019      Visit Date: 10/01/2019    Patient Active Problem List   Diagnosis   • Gastroesophageal reflux disease   • Knee pain   • Iron deficiency anemia   • Abnormal kidney function   • Acute renal failure (CMS/HCC)   • Arthritis of knee   • Arthritis   • Osteoarthrosis, localized, primary, knee   • Primary osteoarthritis of knees, bilateral   • Closed displaced intertrochanteric fracture of right femur (CMS/HCC)   • Fall   • Osteoporosis   • Primary osteoarthritis of right knee   • OA (osteoarthritis) of knee   • Primary osteoarthritis of left knee   • Status post right knee replacement        Past Medical History:   Diagnosis Date   • Abdominal wall dehiscence 2016    Fascial Dehiscence   • Acute renal failure (CMS/HCC)    • Aspiration pneumonia (CMS/HCC)    • Colovaginal fistula    • Diverticulitis     with paracolonic abscess and colovesical fistula,    • Frequent loose stools    • Gastric ulcer    • GERD (gastroesophageal reflux disease)    • H/O Bowel perforation    • History of diverticular abscess    • History of GI bleed    • Osteoarthritis    • Peritonitis (CMS/HCC) 2016   • Septic shock (CMS/[facility])    • Small bowel anastomotic leak 2016        Past Surgical History:   Procedure Laterality Date   • BREAST CYST EXCISION     • COLON RESECTION  2016    Smal Bowel Resection -Dr. Syd Maharaj   • COLON SURGERY Left     with closure of fistula   • EXPLORATORY LAPAROTOMY  2015    With Extensive Adhesiolysis of over 1 hour to allow conduct of the operation and repair of small intestine-Dr. Syd Maharaj   • EXPLORATORY LAPAROTOMY  2016    Dr. Syd Maharaj   • INCISIONAL HERNIA REPAIR  2016    Incisional Hernia Repair with Fascial Dehiscence Repair with Mesh and Bilateral Component  Separation-Dr. Syd Maharaj   • [place] OPEN FIX INTER/SUBTROCH FX,IMPLNT Right 7/24/2017    Procedure: FEMUR INTRAMEDULLARY NAILING/RODDING;  Surgeon: Huan Weiss MD;  Location: [facility] OR;  Service: Orthopedics   • TOTAL ABDOMINAL HYSTERECTOMY  1970s   • TOTAL KNEE ARTHROPLASTY Right 4/29/2019    Procedure: RT TOTAL KNEE ARTHROPLASTY WITH NAVIGATION;  Surgeon: Bolivar Glover MD;  Location: [facility] MAIN OR;  Service: Orthopedics   • TOTAL KNEE ARTHROPLASTY Left 9/11/2019    Procedure: TOTAL KNEE ARTHROPLASTY;  Surgeon: Bolivar Glover MD;  Location: [facility] MAIN OR;  Service: Orthopedics       Visit Dx:     ICD-10-CM ICD-9-CM   1. Status post total knee replacement, left Z96.652 V43.65   2. Chronic pain of left knee M25.562 719.46    G89.29 338.29   3. Difficulty walking R26.2 719.7         Patient History     Row Name 10/01/19 1000             History    Chief Complaint  Pain  -RS      Type of Pain  Knee pain  -RS      Date Current Problem(s) Began  09/11/19  -RS      Brief Description of Current Complaint  The pt is an 78 yo female who presents s/p L TKA on 9/11/19. She has stairs at home but has an electric lift that she has been using. She has been receiving home health since the surgery and her last date was 9/27. She is having some trouble sleeping/ finding a position of comfort. She has throbbing pain L knee, denies numbness or tingling. She has been walking with a cane since a week after.  -RS      Previous treatment for THIS PROBLEM  Surgery  -RS      Surgery Date:  09/11/19  -RS      Patient/Caregiver Goals  Relieve pain;Improve strength;Improve mobility;Return to prior level of function  -RS         Pain     Pain Location  Knee  -RS      Pain at Present  3  -RS      Pain at Best  3  -RS      Pain at Worst  4  -RS      Is your sleep disturbed?  Yes  -RS         Fall Risk Assessment    Any falls in the past year:  No  -RS         Services    Prior Rehab/Home Health Experiences  Yes  -RS      Are you  currently receiving Home Health services  No last date 9/27  -RS         Daily Activities    Primary Language  English  -RS      How does patient learn best?  Listening;Reading  -RS      Pt Participated in POC and Goals  Yes  -RS         Safety    Are you being hurt, hit, or frightened by anyone at home or in your life?  No  -RS      Are you being neglected by a caregiver  No  -RS        User Key  (r) = Recorded By, (t) = Taken By, (c) = Cosigned By    Initials Name Provider Type    RS Alondra Daugherty PT Physical Therapist          PT Ortho     Row Name 10/01/19 1000       Posture/Observations    Observations  Incision healing  -RS    Posture/Observations Comments  minimal to no edema noted  -RS       General ROM    RT Lower Ext  Rt Knee Extension/Flexion  -RS    LT Lower Ext  Lt Knee Extension/Flexion  -RS       Right Lower Ext    Rt Knee Extension/Flexion AROM  lacking   -RS       Left Lower Ext    Lt Knee Extension/Flexion AROM  lacking 4-100  -RS       MMT (Manual Muscle Testing)    Rt Lower Ext  Rt Hip Flexion;Rt Hip ABduction;Rt Knee Extension;Rt Knee Flexion;Rt Ankle Plantarflexion;Rt Ankle Dorsiflexion  -RS    Lt Lower Ext  Lt Hip Flexion;Lt Hip ABduction;Lt Knee Extension;Lt Knee Flexion;Lt Ankle Plantarflexion;Lt Ankle Dorsiflexion  -RS       MMT Right Lower Ext    Rt Hip Flexion MMT, Gross Movement  (4+/5) good plus  -RS    Rt Hip ABduction MMT, Gross Movement  (4/5) good  -RS    Rt Knee Extension MMT, Gross Movement  (4+/5) good plus  -RS    Rt Knee Flexion MMT, Gross Movement  (4+/5) good plus  -RS    Rt Ankle Plantarflexion MMT, Gross Movement  (4+/5) good plus  -RS    Rt Ankle Dorsiflexion MMT, Gross Movement  (5/5) normal  -RS       MMT Left Lower Ext    Lt Hip Flexion MMT, Gross Movement  (4/5) good  -RS    Lt Hip ABduction MMT, Gross Movement  (4/5) good  -RS    Lt Knee Extension MMT, Gross Movement  (4+/5) good plus  -RS    Lt Knee Flexion MMT, Gross Movement  (4/5) good  -RS    Lt Ankle  Plantarflexion MMT, Gross Movement  (4+/5) good plus  -RS    Lt Ankle Dorsiflexion MMT, Gross Movement  (4+/5) good plus  -RS       Sensation    Sensation WNL?  WNL  -RS       Gait/Stairs Assessment/Training    Comment (Gait/Stairs)  Pt ambulates with SPC, decreased stance on LLE compared to RLE, decreased gladis, increased R lateral trunk lean  -RS      User Key  (r) = Recorded By, (t) = Taken By, (c) = Cosigned By    Initials Name Provider Type    RS Alondra Daugherty PT Physical Therapist                      Therapy Education  Education Details: Role of outpatient PT, POC, differential diagnosis, initial HEP, expectations, goals.  Given: HEP  Program: New  How Provided: Verbal, Demonstration, Written  Provided to: Patient  Level of Understanding: Verbalized, Demonstrated, Teach back education performed     PT OP Goals     Row Name 10/01/19 1000          PT Short Term Goals    STG Date to Achieve  10/22/19  -RS     STG 1  The pt will demonstrate IND with initial HEP focused on L knee mobility and strength.  -RS     STG 1 Progress  New  -RS     STG 2  The pt will demonstrate L knee AROM 0-120 for improved functional mobility;  -RS     STG 2 Progress  New  -RS        Long Term Goals    LTG Date to Achieve  11/25/19  -RS     LTG 1  The pt will demonstrate IND with progressive HEP focused on return to PLOF and condition management.  -RS     LTG 1 Progress  New  -RS     LTG 2  The pt will demosntrate L knee strength to at least 4+/5 for improved stair navigation and gait pattern.  -RS     LTG 2 Progress  New  -RS     LTG 3  The pt will ambulate community distances over even ground without AD for improved communitynavigation.  -RS     LTG 3 Progress  New  -RS     LTG 4  The pt will navigate a flight of stairs with handrail for improved household navigation.  -RS     LTG 4 Progress  New  -RS        Time Calculation    PT Goal Re-Cert Due Date  12/30/19  -RS       User Key  (r) = Recorded By, (t) = Taken By, (c) =  Cosigned By    Initials Name Provider Type    RS Alondra Daugherty, PT Physical Therapist          PT Assessment/Plan     Row Name 10/01/19 1100          PT Assessment    Functional Limitations  Impaired gait;Limitation in home management;Limitations in community activities;Limitations in functional capacity and performance;Performance in leisure activities;Performance in self-care ADL  -RS     Impairments  Balance;Endurance;Gait;Range of motion;Poor body mechanics;Pain;Muscle strength  -RS     Assessment Comments  Trice Melara is a 79 y.o. female referred to physical therapy s/p L TKA on 9/11. She presents with a stable clinical presentation, along with no remarkable comorbidities or personal factors that may impact her progress in the plan of care. Pt presents today with decreased L knee AROM and strength, increased pain, altered gait pattern, decreased L SL balance . her signs and symptoms are consistent with referring diagnosis. The previous impairments limit her ability to ambulate in home without AD, navigate stairs, walk community distances. She scores 73/80 ability on the knee outcome survey.  Pt will benefit from skilled PT to address the previous impairments and return to PLOF.  -RS     Please refer to paper survey for additional self-reported information  Yes  -RS     Rehab Potential  Good  -RS     Patient/caregiver participated in establishment of treatment plan and goals  Yes  -RS     Patient would benefit from skilled therapy intervention  Yes  -RS        PT Plan    PT Frequency  2x/week  -RS     Predicted Duration of Therapy Intervention (Therapy Eval)  8 weeks  -RS     Planned CPT's?  PT EVAL LOW COMPLEXITY: 87660;PT RE-EVAL: 66175;PT THER PROC EA 15 MIN: 97320;PT THER ACT EA 15 MIN: 03405;PT MANUAL THERAPY EA 15 MIN: 35882;PT NEUROMUSC RE-EDUCATION EA 15 MIN: 90481;PT GAIT TRAINING EA 15 MIN: 86060;PT SELF CARE/HOME MGMT/TRAIN EA 15: 69955;PT HOT OR COLD PACK TREAT MCARE;PT ELECTRICAL STIM  UNATTEND:   -RS     Physical Therapy Interventions (Optional Details)  balance training;gait training;home exercise program;manual therapy techniques;stretching;strengthening;stair training;ROM (Range of Motion);neuromuscular re-education  -RS     PT Plan Comments  Assess tolerance for initial HEP, progress as tolerated focused initialy on L knee AROM and progressing to functional strength.  -RS       User Key  (r) = Recorded By, (t) = Taken By, (c) = Cosigned By    Initials Name Provider Type    RS Alondra Daugherty PT Physical Therapist            OP Exercises     Row Name 10/01/19 1100             Total Minutes    28694 - PT Therapeutic Exercise Minutes  25  -RS         Exercise 1    Exercise Name 1  nustep  -RS      Cueing 1  Verbal  -RS      Time 1  5min  -RS         Exercise 2    Exercise Name 2  QS  -RS      Cueing 2  Verbal  -RS      Reps 2  10  -RS      Time 2  10s  -RS         Exercise 3    Exercise Name 3  heel slide  -RS      Cueing 3  Verbal  -RS      Reps 3  10  -RS      Time 3  5s  -RS         Exercise 4    Exercise Name 4  glute set  -RS      Cueing 4  Verbal  -RS      Reps 4  10  -RS      Time 4  5s  -RS         Exercise 5    Exercise Name 5  LAQ  -RS      Cueing 5  Verbal  -RS      Reps 5  15  -RS      Time 5  3s  -RS         Exercise 6    Exercise Name 6  stair HS stretch  -RS      Cueing 6  Verbal  -RS      Reps 6  3  -RS      Time 6  20s  -RS         Exercise 7    Exercise Name 7  knee flex stretch stair  -RS      Cueing 7  Verbal  -RS      Reps 7  5  -RS      Time 7  15s  -RS         Exercise 8    Exercise Name 8  heel raise  -RS      Cueing 8  Verbal  -RS      Reps 8  15  -RS        User Key  (r) = Recorded By, (t) = Taken By, (c) = Cosigned By    Initials Name Provider Type    RS Alondra Daugherty PT Physical Therapist                        Outcome Measure Options: Knee Outcome Score- ADL  Knee Outcome Score  Knee Outcome Score Comments: 73/80      Time Calculation:     Start Time:  1043  Stop Time: 1129  Time Calculation (min): 46 min     Therapy Charges for Today     Code Description Service Date Service Provider Modifiers Qty    90381737471 HC PT THER PROC EA 15 MIN 10/1/2019 Alondra Daugherty, PT GP 2    31485699631 HC PT EVAL LOW COMPLEXITY 1 10/1/2019 Alondra Daugherty, PT GP 1          PT G-Codes  Outcome Measure Options: Knee Outcome Score- ADL         Alondra Daugherty PT  10/1/2019

## 2019-10-03 ENCOUNTER — HOSPITAL ENCOUNTER (OUTPATIENT)
Dept: PHYSICAL THERAPY | Facility: HOSPITAL | Age: 79
Setting detail: THERAPIES SERIES
Discharge: HOME | End: 2019-10-03

## 2019-10-03 DIAGNOSIS — Z96.652 STATUS POST TOTAL KNEE REPLACEMENT, LEFT: Primary | ICD-10-CM

## 2019-10-03 DIAGNOSIS — M25.562 CHRONIC PAIN OF LEFT KNEE: ICD-10-CM

## 2019-10-03 DIAGNOSIS — R26.2 DIFFICULTY WALKING: ICD-10-CM

## 2019-10-03 DIAGNOSIS — G89.29 CHRONIC PAIN OF LEFT KNEE: ICD-10-CM

## 2019-10-03 PROCEDURE — 97110 THERAPEUTIC EXERCISES: CPT

## 2019-10-04 ENCOUNTER — APPOINTMENT (OUTPATIENT)
Dept: PHYSICAL THERAPY | Facility: HOSPITAL | Age: 79
End: 2019-10-04

## 2019-10-08 ENCOUNTER — HOSPITAL ENCOUNTER (OUTPATIENT)
Dept: PHYSICAL THERAPY | Facility: HOSPITAL | Age: 79
Setting detail: THERAPIES SERIES
Discharge: HOME | End: 2019-10-08

## 2019-10-08 DIAGNOSIS — Z96.652 STATUS POST TOTAL KNEE REPLACEMENT, LEFT: Primary | ICD-10-CM

## 2019-10-08 DIAGNOSIS — R26.2 DIFFICULTY WALKING: ICD-10-CM

## 2019-10-08 DIAGNOSIS — G89.29 CHRONIC PAIN OF LEFT KNEE: ICD-10-CM

## 2019-10-08 DIAGNOSIS — M25.562 CHRONIC PAIN OF LEFT KNEE: ICD-10-CM

## 2019-10-08 PROCEDURE — 97110 THERAPEUTIC EXERCISES: CPT

## 2019-10-08 NOTE — THERAPY TREATMENT NOTE
Outpatient Physical Therapy Ortho Treatment Note  [facility]     Patient Name: Trice Melara  : 1940  MRN: [id number]  Today's Date: 10/8/2019      Visit Date: 10/08/2019    Visit Dx:    ICD-10-CM ICD-9-CM   1. Status post total knee replacement, left Z96.652 V43.65   2. Chronic pain of left knee M25.562 719.46    G89.29 338.29   3. Difficulty walking R26.2 719.7       Patient Active Problem List   Diagnosis   • Gastroesophageal reflux disease   • Knee pain   • Iron deficiency anemia   • Abnormal kidney function   • Acute renal failure (CMS/HCC)   • Arthritis of knee   • Arthritis   • Osteoarthrosis, localized, primary, knee   • Primary osteoarthritis of knees, bilateral   • Closed displaced intertrochanteric fracture of right femur (CMS/HCC)   • Fall   • Osteoporosis   • Primary osteoarthritis of right knee   • OA (osteoarthritis) of knee   • Primary osteoarthritis of left knee   • Status post right knee replacement        Past Medical History:   Diagnosis Date   • Abdominal wall dehiscence 2016    Fascial Dehiscence   • Acute renal failure (CMS/HCC)    • Aspiration pneumonia (CMS/HCC)    • Colovaginal fistula    • Diverticulitis     with paracolonic abscess and colovesical fistula,    • Frequent loose stools    • Gastric ulcer    • GERD (gastroesophageal reflux disease)    • H/O Bowel perforation    • History of diverticular abscess    • History of GI bleed    • Osteoarthritis    • Peritonitis (CMS/HCC) 2016   • Septic shock (CMS/[facility])    • Small bowel anastomotic leak 2016        Past Surgical History:   Procedure Laterality Date   • BREAST CYST EXCISION     • COLON RESECTION  2016    Smal Bowel Resection -Dr. Syd Maharaj   • COLON SURGERY Left     with closure of fistula   • EXPLORATORY LAPAROTOMY  2015    With Extensive Adhesiolysis of over 1 hour to allow conduct of the operation and repair of small intestine-Dr. Syd Maharaj   •  EXPLORATORY LAPAROTOMY  01/08/2016    Dr. Syd Maharaj   • INCISIONAL HERNIA REPAIR  01/08/2016    Incisional Hernia Repair with Fascial Dehiscence Repair with Mesh and Bilateral Component Separation-Dr. Syd Maharaj   • [place] OPEN FIX INTER/SUBTROCH FX,IMPLNT Right 7/24/2017    Procedure: FEMUR INTRAMEDULLARY NAILING/RODDING;  Surgeon: Huan Weiss MD;  Location: [facility] MAIN OR;  Service: Orthopedics   • TOTAL ABDOMINAL HYSTERECTOMY  1970s   • TOTAL KNEE ARTHROPLASTY Right 4/29/2019    Procedure: RT TOTAL KNEE ARTHROPLASTY WITH NAVIGATION;  Surgeon: Bolivar Glover MD;  Location: [facility] MAIN OR;  Service: Orthopedics   • TOTAL KNEE ARTHROPLASTY Left 9/11/2019    Procedure: TOTAL KNEE ARTHROPLASTY;  Surgeon: Bolivar Glover MD;  Location: [facility] OR;  Service: Orthopedics       PT Ortho     Row Name 10/08/19 1600       Subjective Comments    Subjective Comments  pain at nightleft knee with occassional 1/2 pain pill  -SI       Subjective Pain    Able to rate subjective pain?  yes  -SI       Left Lower Ext    Lt Knee Extension/Flexion AROM  -5 to 115  -SI       Gait/Stairs Assessment/Training    Handrail Location (Stairs)  right side (ascending)  -SI    Number of Steps (Stairs)  5  -SI    Ascending Technique (Stairs)  step-over-step  -SI    Descending Technique (Stairs)  step-to-step  -SI    Comment (Gait/Stairs)  GAit with or without cane level surfaces  -SI      User Key  (r) = Recorded By, (t) = Taken By, (c) = Cosigned By    Initials Name Provider Type    Larissa Saravia PTA Physical Therapy Assistant                      PT Assessment/Plan     Row Name 10/08/19 1657          PT Assessment    Assessment Comments  ROM improving nicely.  Gait improving nicely.  Stretching BID sterength OD as only scheduled for PT once a week.  -SI       User Key  (r) = Recorded By, (t) = Taken By, (c) = Cosigned By    Initials Name Provider Type    Larissa Saravia PTA Physical Therapy Assistant            OP  Exercises     Row Name 10/08/19 1600             Subjective Comments    Subjective Comments  pain at nightleft knee with occassional 1/2 pain pill  -SI         Subjective Pain    Able to rate subjective pain?  yes  -SI         Total Minutes    67502 - PT Therapeutic Exercise Minutes  45  -SI         Exercise 1    Exercise Name 1  nustep  -SI      Cueing 1  Verbal  -SI      Time 1  5min  -SI         Exercise 2    Exercise Name 2  QS  -SI      Cueing 2  Verbal  -SI      Reps 2  10  -SI      Time 2  10s  -SI         Exercise 3    Exercise Name 3  heel slide  -SI      Cueing 3  Verbal  -SI      Reps 3  10  -SI      Time 3  5s  -SI         Exercise 4    Exercise Name 4  glute set  -SI      Cueing 4  Verbal  -SI      Reps 4  10  -SI      Time 4  5s  -SI         Exercise 5    Exercise Name 5  LAQ and SAQ  -SI      Cueing 5  Verbal  -SI      Reps 5  20  -SI      Time 5  3s  -SI      Additional Comments  2lbs  -SI         Exercise 6    Exercise Name 6   HS stretch  -SI      Cueing 6  Verbal  -SI      Reps 6  3  -SI      Time 6  20s  -SI         Exercise 7    Exercise Name 7  knee flex stretch stair  -SI      Cueing 7  Verbal  -SI      Reps 7  5  -SI      Time 7  15s  -SI         Exercise 8    Exercise Name 8  heel raise  -SI      Cueing 8  Verbal  -SI      Reps 8  15  -SI         Exercise 9    Exercise Name 9  Hip abd/ER with green TB  -SI      Sets 9  2  -SI      Reps 9  10  -SI         Exercise 10    Exercise Name 10  Chair to chair heel prop for extension  -SI      Sets 10     -SI      Time 10  5 min  -SI        User Key  (r) = Recorded By, (t) = Taken By, (c) = Cosigned By    Initials Name Provider Type    Larissa Saravia, PTA Physical Therapy Assistant                                          Time Calculation:   Start Time: 1615  Stop Time: 1700  Time Calculation (min): 45 min  Total Timed Code Minutes- PT: 45 minute(s)  Therapy Charges for Today     Code Description Service Date Service Provider Modifiers Qty     61302564630  PT THER PROC EA 15 MIN 10/8/2019 Larissa York, PTA GP 3                    Larissa York, ERI  10/8/2019

## 2019-10-09 ENCOUNTER — APPOINTMENT (OUTPATIENT)
Dept: PHYSICAL THERAPY | Facility: HOSPITAL | Age: 79
End: 2019-10-09

## 2019-10-15 ENCOUNTER — TELEPHONE (OUTPATIENT)
Dept: INTERNAL MEDICINE | Facility: CLINIC | Age: 79
End: 2019-10-15

## 2019-10-15 RX ORDER — MELOXICAM 7.5 MG/1
7.5 TABLET ORAL DAILY
Qty: 30 TABLET | Refills: 3 | Status: SHIPPED | OUTPATIENT
Start: 2019-10-15 | End: 2019-11-05

## 2019-10-16 ENCOUNTER — APPOINTMENT (OUTPATIENT)
Dept: PHYSICAL THERAPY | Facility: HOSPITAL | Age: 79
End: 2019-10-16

## 2019-10-17 ENCOUNTER — HOSPITAL ENCOUNTER (OUTPATIENT)
Dept: PHYSICAL THERAPY | Facility: HOSPITAL | Age: 79
Setting detail: THERAPIES SERIES
Discharge: HOME | End: 2019-10-17

## 2019-10-17 PROCEDURE — 97110 THERAPEUTIC EXERCISES: CPT

## 2019-10-17 NOTE — THERAPY TREATMENT NOTE
Outpatient Physical Therapy Ortho Treatment Note  [facility]     Patient Name: Trice Melara  : 1940  MRN: [id number]  Today's Date: 10/17/2019      Visit Date: 10/17/2019    Visit Dx:  No diagnosis found.    Patient Active Problem List   Diagnosis   • Gastroesophageal reflux disease   • Knee pain   • Iron deficiency anemia   • Abnormal kidney function   • Acute renal failure (CMS/HCC)   • Arthritis of knee   • Arthritis   • Osteoarthrosis, localized, primary, knee   • Primary osteoarthritis of knees, bilateral   • Closed displaced intertrochanteric fracture of right femur (CMS/HCC)   • Fall   • Osteoporosis   • Primary osteoarthritis of right knee   • OA (osteoarthritis) of knee   • Primary osteoarthritis of left knee   • Status post right knee replacement        Past Medical History:   Diagnosis Date   • Abdominal wall dehiscence 2016    Fascial Dehiscence   • Acute renal failure (CMS/HCC)    • Aspiration pneumonia (CMS/HCC)    • Colovaginal fistula    • Diverticulitis     with paracolonic abscess and colovesical fistula,    • Frequent loose stools    • Gastric ulcer    • GERD (gastroesophageal reflux disease)    • H/O Bowel perforation    • History of diverticular abscess    • History of GI bleed    • Osteoarthritis    • Peritonitis (CMS/HCC) 2016   • Septic shock (CMS/[facility])    • Small bowel anastomotic leak 2016        Past Surgical History:   Procedure Laterality Date   • BREAST CYST EXCISION     • COLON RESECTION  2016    Smal Bowel Resection -Dr. Syd Maharaj   • COLON SURGERY Left     with closure of fistula   • EXPLORATORY LAPAROTOMY  2015    With Extensive Adhesiolysis of over 1 hour to allow conduct of the operation and repair of small intestine-Dr. Syd Maharaj   • EXPLORATORY LAPAROTOMY  2016    Dr. Syd Maharaj   • INCISIONAL HERNIA REPAIR  2016    Incisional Hernia Repair with Fascial Dehiscence Repair with Mesh and  Bilateral Component Separation-Dr. Syd Maharaj   • [place] OPEN FIX INTER/SUBTROCH FX,IMPLNT Right 7/24/2017    Procedure: FEMUR INTRAMEDULLARY NAILING/RODDING;  Surgeon: Huan Weiss MD;  Location: [facility] OR;  Service: Orthopedics   • TOTAL ABDOMINAL HYSTERECTOMY  1970s   • TOTAL KNEE ARTHROPLASTY Right 4/29/2019    Procedure: RT TOTAL KNEE ARTHROPLASTY WITH NAVIGATION;  Surgeon: Bolivar Glover MD;  Location: [facility] OR;  Service: Orthopedics   • TOTAL KNEE ARTHROPLASTY Left 9/11/2019    Procedure: TOTAL KNEE ARTHROPLASTY;  Surgeon: Bolivar Glover MD;  Location: [facility] OR;  Service: Orthopedics       PT Ortho     Row Name 10/17/19 1500       Subjective Comments    Subjective Comments  Sleeping good at night now. Taking 1/2 of a pain pill at night if have been waling a lot  -SI       Subjective Pain    Able to rate subjective pain?  yes  -SI       Left Lower Ext    Lt Knee Extension/Flexion AROM  -3 to 115  -SI      User Key  (r) = Recorded By, (t) = Taken By, (c) = Cosigned By    Initials Name Provider Type    Larissa Saravia PTA Physical Therapy Assistant                      PT Assessment/Plan     Row Name 10/17/19 8097          PT Assessment    Assessment Comments  Pt only time for 30 min session.  She is walking normally level surfaces. Will do stairs next session.  ROM progressing niecly  -SI       User Key  (r) = Recorded By, (t) = Taken By, (c) = Cosigned By    Initials Name Provider Type    Larissa Saravia PTA Physical Therapy Assistant            OP Exercises     Row Name 10/17/19 1500             Subjective Comments    Subjective Comments  Sleeping good at night now. Taking 1/2 of a pain pill at night if have been waling a lot  -SI         Subjective Pain    Able to rate subjective pain?  yes  -SI         Total Minutes    90394 - PT Therapeutic Exercise Minutes  30  -SI         Exercise 1    Exercise Name 1  nustep  -SI      Cueing 1  Verbal  -SI      Time 1  5min  -SI          Exercise 2    Exercise Name 2  QS  -SI      Cueing 2  Verbal  -SI      Reps 2  10  -SI      Time 2  10s  -SI      Additional Comments  ------------------  -SI         Exercise 3    Exercise Name 3  heel slide  -SI      Cueing 3  Verbal  -SI      Reps 3  10  -SI      Time 3  5s  -SI      Additional Comments  ---------------  -SI         Exercise 4    Exercise Name 4  glute set  -SI      Cueing 4  Verbal  -SI      Reps 4  10  -SI      Time 4  5s  -SI      Additional Comments  ----------------------  -SI         Exercise 5    Exercise Name 5  LAQ and SAQ  -SI      Cueing 5  Verbal  -SI      Reps 5  20  -SI      Time 5  3s  -SI      Additional Comments  3lbs  -SI         Exercise 6    Exercise Name 6   HS stretch  -SI      Cueing 6  Verbal  -SI      Reps 6  3  -SI      Time 6  20s  -SI         Exercise 7    Exercise Name 7  knee flex stretch stair  -SI      Cueing 7  Verbal  -SI      Reps 7  5  -SI      Time 7  15s  -SI         Exercise 8    Exercise Name 8  heel raise  -SI      Cueing 8  Verbal  -SI      Reps 8  15  -SI         Exercise 9    Exercise Name 9  Hip abd/ER with green TB  -SI      Sets 9  2  -SI      Reps 9  10  -SI         Exercise 10    Exercise Name 10  Chair to chair heel prop for extension  -SI      Sets 10     -SI      Time 10  5 min  -SI         Exercise 11    Exercise Name 11  HS curls red TB  -SI      Reps 11  20  -SI        User Key  (r) = Recorded By, (t) = Taken By, (c) = Cosigned By    Initials Name Provider Type    Larissa Saravia PTA Physical Therapy Assistant                                          Time Calculation:   Start Time: 1530  Stop Time: 1600  Time Calculation (min): 30 min  Total Timed Code Minutes- PT: 30 minute(s)  Therapy Charges for Today     Code Description Service Date Service Provider Modifiers Qty    49494225304 HC PT THER PROC EA 15 MIN 10/17/2019 Larissa York PTA GP 2                    Larissa York PTA  10/17/2019

## 2019-10-18 ENCOUNTER — APPOINTMENT (OUTPATIENT)
Dept: PHYSICAL THERAPY | Facility: HOSPITAL | Age: 79
End: 2019-10-18

## 2019-10-22 ENCOUNTER — APPOINTMENT (OUTPATIENT)
Dept: PHYSICAL THERAPY | Facility: HOSPITAL | Age: 79
End: 2019-10-22

## 2019-10-23 ENCOUNTER — APPOINTMENT (OUTPATIENT)
Dept: PHYSICAL THERAPY | Facility: HOSPITAL | Age: 79
End: 2019-10-23

## 2019-10-24 ENCOUNTER — APPOINTMENT (OUTPATIENT)
Dept: PHYSICAL THERAPY | Facility: HOSPITAL | Age: 79
End: 2019-10-24

## 2019-10-24 DIAGNOSIS — D50.8 OTHER IRON DEFICIENCY ANEMIA: ICD-10-CM

## 2019-10-24 DIAGNOSIS — Z00.00 HEALTHCARE MAINTENANCE: ICD-10-CM

## 2019-10-24 DIAGNOSIS — N28.9 ABNORMAL KIDNEY FUNCTION: Primary | ICD-10-CM

## 2019-10-25 ENCOUNTER — APPOINTMENT (OUTPATIENT)
Dept: PHYSICAL THERAPY | Facility: HOSPITAL | Age: 79
End: 2019-10-25

## 2019-10-30 ENCOUNTER — APPOINTMENT (OUTPATIENT)
Dept: PHYSICAL THERAPY | Facility: HOSPITAL | Age: 79
End: 2019-10-30

## 2019-10-31 ENCOUNTER — FLU SHOT (OUTPATIENT)
Dept: INTERNAL MEDICINE | Facility: CLINIC | Age: 79
End: 2019-10-31

## 2019-10-31 ENCOUNTER — HOSPITAL ENCOUNTER (OUTPATIENT)
Dept: PHYSICAL THERAPY | Facility: HOSPITAL | Age: 79
Setting detail: THERAPIES SERIES
Discharge: HOME | End: 2019-10-31

## 2019-10-31 DIAGNOSIS — Z96.652 STATUS POST TOTAL KNEE REPLACEMENT, LEFT: Primary | ICD-10-CM

## 2019-10-31 DIAGNOSIS — G89.29 CHRONIC PAIN OF LEFT KNEE: ICD-10-CM

## 2019-10-31 DIAGNOSIS — M25.562 CHRONIC PAIN OF LEFT KNEE: ICD-10-CM

## 2019-10-31 DIAGNOSIS — R26.2 DIFFICULTY WALKING: ICD-10-CM

## 2019-10-31 PROCEDURE — 90653 IIV ADJUVANT VACCINE IM: CPT | Performed by: INTERNAL MEDICINE

## 2019-10-31 PROCEDURE — G0008 ADMIN INFLUENZA VIRUS VAC: HCPCS | Performed by: INTERNAL MEDICINE

## 2019-10-31 PROCEDURE — 97110 THERAPEUTIC EXERCISES: CPT

## 2019-11-01 ENCOUNTER — APPOINTMENT (OUTPATIENT)
Dept: PHYSICAL THERAPY | Facility: HOSPITAL | Age: 79
End: 2019-11-01

## 2019-11-01 LAB
ALBUMIN SERPL-MCNC: 4.6 G/DL (ref 3.5–5.2)
ALBUMIN/GLOB SERPL: 2.1 G/DL
ALP SERPL-CCNC: 90 U/L (ref 39–117)
ALT SERPL-CCNC: 11 U/L (ref 1–33)
APPEARANCE UR: CLEAR
AST SERPL-CCNC: 21 U/L (ref 1–32)
BACTERIA #/AREA URNS HPF: NORMAL /HPF
BASOPHILS # BLD AUTO: 0.05 10*3/MM3 (ref 0–0.2)
BASOPHILS NFR BLD AUTO: 0.3 % (ref 0–1.5)
BILIRUB SERPL-MCNC: 0.5 MG/DL (ref 0.2–1.2)
BILIRUB UR QL STRIP: NEGATIVE
BUN SERPL-MCNC: 11 MG/DL (ref 8–23)
BUN/CREAT SERPL: 18 (ref 7–25)
CALCIUM SERPL-MCNC: 9.2 MG/DL (ref 8.6–10.5)
CHLORIDE SERPL-SCNC: 96 MMOL/L (ref 98–107)
CHOLEST SERPL-MCNC: 147 MG/DL (ref 0–200)
CO2 SERPL-SCNC: 30.4 MMOL/L (ref 22–29)
COLOR UR: YELLOW
CREAT SERPL-MCNC: 0.61 MG/DL (ref 0.57–1)
EOSINOPHIL # BLD AUTO: 0.04 10*3/MM3 (ref 0–0.4)
EOSINOPHIL NFR BLD AUTO: 0.3 % (ref 0.3–6.2)
EPI CELLS #/AREA URNS HPF: NORMAL /HPF
ERYTHROCYTE [DISTWIDTH] IN BLOOD BY AUTOMATED COUNT: 13.8 % (ref 12.3–15.4)
GLOBULIN SER CALC-MCNC: 2.2 GM/DL
GLUCOSE SERPL-MCNC: 116 MG/DL (ref 65–99)
GLUCOSE UR QL: NEGATIVE
HCT VFR BLD AUTO: 41.5 % (ref 34–46.6)
HDLC SERPL-MCNC: 51 MG/DL (ref 40–60)
HGB BLD-MCNC: 13.4 G/DL (ref 12–15.9)
HGB UR QL STRIP: NEGATIVE
IMM GRANULOCYTES # BLD AUTO: 0.07 10*3/MM3 (ref 0–0.05)
IMM GRANULOCYTES NFR BLD AUTO: 0.5 % (ref 0–0.5)
KETONES UR QL STRIP: ABNORMAL
LDLC SERPL CALC-MCNC: 81 MG/DL (ref 0–100)
LEUKOCYTE ESTERASE UR QL STRIP: NEGATIVE
LYMPHOCYTES # BLD AUTO: 0.63 10*3/MM3 (ref 0.7–3.1)
LYMPHOCYTES NFR BLD AUTO: 4.4 % (ref 19.6–45.3)
MCH RBC QN AUTO: 26.7 PG (ref 26.6–33)
MCHC RBC AUTO-ENTMCNC: 32.3 G/DL (ref 31.5–35.7)
MCV RBC AUTO: 82.7 FL (ref 79–97)
MICRO URNS: ABNORMAL
MICRO URNS: ABNORMAL
MONOCYTES # BLD AUTO: 0.75 10*3/MM3 (ref 0.1–0.9)
MONOCYTES NFR BLD AUTO: 5.2 % (ref 5–12)
MUCOUS THREADS URNS QL MICRO: PRESENT /HPF
NEUTROPHILS # BLD AUTO: 12.77 10*3/MM3 (ref 1.7–7)
NEUTROPHILS NFR BLD AUTO: 89.3 % (ref 42.7–76)
NITRITE UR QL STRIP: NEGATIVE
NRBC BLD AUTO-RTO: 0 /100 WBC (ref 0–0.2)
PH UR STRIP: 7.5 [PH] (ref 5–7.5)
PLATELET # BLD AUTO: 207 10*3/MM3 (ref 140–450)
POTASSIUM SERPL-SCNC: 3.5 MMOL/L (ref 3.5–5.2)
PROT SERPL-MCNC: 6.8 G/DL (ref 6–8.5)
PROT UR QL STRIP: NEGATIVE
RBC # BLD AUTO: 5.02 10*6/MM3 (ref 3.77–5.28)
RBC #/AREA URNS HPF: NORMAL /HPF
SODIUM SERPL-SCNC: 142 MMOL/L (ref 136–145)
SP GR UR: 1.02 (ref 1–1.03)
TRIGL SERPL-MCNC: 73 MG/DL (ref 0–150)
TSH SERPL DL<=0.005 MIU/L-ACNC: 1.23 UIU/ML (ref 0.27–4.2)
URINALYSIS REFLEX: ABNORMAL
UROBILINOGEN UR STRIP-MCNC: 0.2 MG/DL (ref 0.2–1)
VLDLC SERPL CALC-MCNC: 14.6 MG/DL
WBC # BLD AUTO: 14.31 10*3/MM3 (ref 3.4–10.8)
WBC #/AREA URNS HPF: NORMAL /HPF

## 2019-11-05 ENCOUNTER — OFFICE VISIT (OUTPATIENT)
Dept: INTERNAL MEDICINE | Facility: CLINIC | Age: 79
End: 2019-11-05

## 2019-11-05 VITALS
DIASTOLIC BLOOD PRESSURE: 66 MMHG | WEIGHT: 124 LBS | SYSTOLIC BLOOD PRESSURE: 146 MMHG | BODY MASS INDEX: 24.35 KG/M2 | HEIGHT: 60 IN | OXYGEN SATURATION: 96 % | HEART RATE: 94 BPM

## 2019-11-05 DIAGNOSIS — D72.829 LEUKOCYTOSIS, UNSPECIFIED TYPE: ICD-10-CM

## 2019-11-05 DIAGNOSIS — M17.0 PRIMARY OSTEOARTHRITIS OF KNEES, BILATERAL: ICD-10-CM

## 2019-11-05 DIAGNOSIS — M81.0 AGE-RELATED OSTEOPOROSIS WITHOUT CURRENT PATHOLOGICAL FRACTURE: ICD-10-CM

## 2019-11-05 DIAGNOSIS — Z00.00 HEALTHCARE MAINTENANCE: Primary | ICD-10-CM

## 2019-11-05 DIAGNOSIS — Z12.31 BREAST CANCER SCREENING BY MAMMOGRAM: ICD-10-CM
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
## 2019-11-05 PROBLEM — M17.11 PRIMARY OSTEOARTHRITIS OF RIGHT KNEE: Status: RESOLVED | Noted: 2019-03-15 | Resolved: 2019-11-05

## 2019-11-05 PROBLEM — M17.9 OA (OSTEOARTHRITIS) OF KNEE: Status: RESOLVED | Noted: 2019-04-29 | Resolved: 2019-11-05

## 2019-11-05 PROBLEM — M17.12 PRIMARY OSTEOARTHRITIS OF LEFT KNEE: Status: RESOLVED | Noted: 2019-07-12 | Resolved: 2019-11-05

## 2019-11-05 LAB
HCT VFR BLDA CALC: 42.2 % (ref 38–51)
HGB BLDA-MCNC: 12.7 G/DL (ref 12–17)
LYMPHOCYTES # BLD: 35.9 %
MCH, POC: 25.4
MCHC, POC: 30
MCV, POC: 84.6
MONOCYTES # BLD: 3.5 %
PLATELET # BLD: 233 10*3/MM3
PMV BLD: 8.3 FL
POC NEUTROPHIL: 60.6 %
RBC, POC: 4.99
RDW, POC: 15.8
WBC # BLD: 7.4 10*3/UL

## 2019-11-05 PROCEDURE — 85025 COMPLETE CBC W/AUTO DIFF WBC: CPT | Performed by: INTERNAL MEDICINE

## 2019-11-05 PROCEDURE — 99214 OFFICE O/P EST MOD 30 MIN: CPT | Performed by: INTERNAL MEDICINE

## 2019-11-05 PROCEDURE — 90471 IMMUNIZATION ADMIN: CPT | Performed by: INTERNAL MEDICINE

## 2019-11-05 PROCEDURE — 90715 TDAP VACCINE 7 YRS/> IM: CPT | Performed by: INTERNAL MEDICINE

## 2019-11-05 PROCEDURE — G0009 ADMIN PNEUMOCOCCAL VACCINE: HCPCS | Performed by: INTERNAL MEDICINE

## 2019-11-05 PROCEDURE — G0439 PPPS, SUBSEQ VISIT: HCPCS | Performed by: INTERNAL MEDICINE

## 2019-11-05 PROCEDURE — 77080 DXA BONE DENSITY AXIAL: CPT | Performed by: INTERNAL MEDICINE

## 2019-11-05 PROCEDURE — 90732 PPSV23 VACC 2 YRS+ SUBQ/IM: CPT | Performed by: INTERNAL MEDICINE

## 2019-11-05 RX ORDER — OMEPRAZOLE 20 MG/1
20 CAPSULE, DELAYED RELEASE ORAL DAILY
Qty: 90 CAPSULE | Refills: 3 | Status: SHIPPED | OUTPATIENT
Start: 2019-11-05 | End: 2020-10-28

## 2019-11-05 NOTE — PROGRESS NOTES
Subjective   AWV  OA s/p TKR B lower extremities  Osteoporosis      Trice Melara is a 79 y.o. female who presents as a new patient to me for an annual wellness visit, to discuss chronic issues, and to discuss any acute concerns. She is s/p B TKR and has healed from this very well. Patient has osteoporosis. She is currently not on therapy. No recent dexa scores. She has fractured a femur in the past when her dog caused her to fall. Patient has a history of anemia but hgb was wnl on most recent testing. Her wbc is a little elevated. She smokes about 5 cig daily. She is not interested in d/c this. She is not interested in low dose ct lung scan.           Review of Systems   Constitutional: Negative.    HENT: Negative.    Eyes: Negative.    Respiratory: Negative.    Cardiovascular: Negative.    Gastrointestinal: Negative.    Endocrine: Negative.    Genitourinary: Negative.    Musculoskeletal: Positive for arthralgias.        Recovering well from B TKR   Skin: Negative.    Allergic/Immunologic: Negative.    Neurological: Negative.    Hematological: Negative.    Psychiatric/Behavioral: Negative.        The following portions of the patient's history were reviewed and updated as appropriate: allergies, current medications, past family history, past medical history, past social history, past surgical history and problem list.     Patient Active Problem List   Diagnosis   • Gastroesophageal reflux disease   • Abnormal kidney function   • Primary osteoarthritis of knees, bilateral   • Closed displaced intertrochanteric fracture of right femur (CMS/HCC)   • Osteoporosis   • Status post right knee replacement       Past Medical History:   Diagnosis Date   • Abdominal wall dehiscence 01/08/2016    Fascial Dehiscence   • Acute renal failure (CMS/HCC)    • Aspiration pneumonia (CMS/HCC)    • Colovaginal fistula 2004   • Diverticulitis 2010    with paracolonic abscess and colovesical fistula, 2004   • Frequent loose stools    •  Gastric ulcer    • GERD (gastroesophageal reflux disease)    • H/O Bowel perforation 2004   • History of diverticular abscess    • History of GI bleed 2010   • Osteoarthritis    • Peritonitis (CMS/HCC) 01/2016   • Septic shock (CMS/HCC)    • Small bowel anastomotic leak 01/08/2016       Past Surgical History:   Procedure Laterality Date   • BREAST CYST EXCISION     • COLON RESECTION  01/08/2016    Smal Bowel Resection -Dr. Syd Maharaj   • COLON SURGERY Left 2010    with closure of fistula   • EXPLORATORY LAPAROTOMY  12/31/2015    With Extensive Adhesiolysis of over 1 hour to allow conduct of the operation and repair of small intestine-Dr. Syd Maharaj   • EXPLORATORY LAPAROTOMY  01/08/2016    Dr. Syd Maharaj   • INCISIONAL HERNIA REPAIR  01/08/2016    Incisional Hernia Repair with Fascial Dehiscence Repair with Mesh and Bilateral Component Separation-Dr. Syd Maharaj   • FL OPEN FIX INTER/SUBTROCH FX,IMPLNT Right 7/24/2017    Procedure: FEMUR INTRAMEDULLARY NAILING/RODDING;  Surgeon: Huan Weiss MD;  Location: [facility] OR;  Service: Orthopedics   • TOTAL ABDOMINAL HYSTERECTOMY  1970s   • TOTAL KNEE ARTHROPLASTY Right 4/29/2019    Procedure: RT TOTAL KNEE ARTHROPLASTY WITH NAVIGATION;  Surgeon: Bolivar Glover MD;  Location: [facility] OR;  Service: Orthopedics   • TOTAL KNEE ARTHROPLASTY Left 9/11/2019    Procedure: TOTAL KNEE ARTHROPLASTY;  Surgeon: Bolivar Glover MD;  Location: [facility] OR;  Service: Orthopedics       Family History   Problem Relation Age of Onset   • Dementia Mother    • Thyroid disease Mother    • Leukemia Father    • Thyroid disease Brother    • Malig Hyperthermia Neg Hx        Social History     Socioeconomic History   • Marital status:      Spouse name: Not on file   • Number of children: Not on file   • Years of education: Not on file   • Highest education level: Not on file   Occupational History     Comment: Part-time billing for a psychotherapist   Tobacco  "Use   • Smoking status: Current Some Day Smoker     Packs/day: 0.25     Years: 50.00     Pack years: 12.50     Types: Cigarettes   • Smokeless tobacco: Never Used   • Tobacco comment: 5-6 CIG DAY   Substance and Sexual Activity   • Alcohol use: Yes     Comment: 1-2 per evening   • Drug use: No   • Sexual activity: Defer       Current Outpatient Medications on File Prior to Visit   Medication Sig Dispense Refill   • diphenhydrAMINE (BENADRYL) 25 mg capsule Take 25 mg by mouth 2 (Two) Times a Day As Needed.     • diphenoxylate-atropine (LOMOTIL) 2.5-0.025 MG per tablet Take 1 tablet by mouth 4 (Four) Times a Day As Needed.     • ondansetron (ZOFRAN) 4 MG tablet Take 1 tablet by mouth Every 6 (Six) Hours As Needed for Nausea or Vomiting. 10 tablet 0   • [DISCONTINUED] meloxicam (MOBIC) 7.5 MG tablet Take 1 tablet by mouth Daily. 30 tablet 3   • [DISCONTINUED] omeprazole (priLOSEC) 40 MG capsule Take 1 capsule by mouth Every Night. 90 capsule 1   • [DISCONTINUED] aspirin  MG EC tablet Take 1 tablet by mouth 2 (Two) Times a Day. 60 tablet 0     Current Facility-Administered Medications on File Prior to Visit   Medication Dose Route Frequency Provider Last Rate Last Dose   • [DISCONTINUED] mupirocin (BACTROBAN) 2 % nasal ointment   Nasal BID Bolivar Glover MD       • [DISCONTINUED] mupirocin (BACTROBAN) 2 % nasal ointment   Nasal BID Bolivar Glover MD           No Known Allergies    Immunization History   Administered Date(s) Administered   • Flu Vaccine High Dose PF 65YR+ 09/28/2016, 10/11/2018   • Fluad Quad 10/31/2019   • Pneumococcal Conjugate 13-Valent (PCV13) 06/29/2017   • Pneumococcal Polysaccharide (PPSV23) 11/05/2019   • TD Preservative Free 11/05/2019       Objective     /66   Pulse 94   Ht 151.8 cm (59.75\")   Wt 56.2 kg (124 lb)   SpO2 96%   BMI 24.42 kg/m²     Physical Exam   Constitutional: She is oriented to person, place, and time. She appears well-developed and well-nourished.   Thin wf. " No acute distress.    HENT:   Head: Normocephalic and atraumatic.   Right Ear: External ear normal.   Left Ear: External ear normal.   Nose: Nose normal.   Mouth/Throat: Oropharynx is clear and moist.   Eyes: EOM are normal. Pupils are equal, round, and reactive to light.   Neck: Normal range of motion. Neck supple.   Cardiovascular: Normal rate, regular rhythm, normal heart sounds and intact distal pulses.   Pulmonary/Chest: Effort normal and breath sounds normal.   Abdominal: Soft. Bowel sounds are normal.   Musculoskeletal: Normal range of motion.   Neurological: She is alert and oriented to person, place, and time.   Skin: Skin is warm and dry.   Psychiatric: She has a normal mood and affect. Her behavior is normal. Judgment and thought content normal.   Nursing note and vitals reviewed.      Assessment/Plan   Trice was seen today for annual exam.    Diagnoses and all orders for this visit:    Healthcare maintenance  -     DEXA Bone Density Axial    Breast cancer screening by mammogram  -     Mammo screening digital tomosynthesis bilateral w CAD; Future    Age-related osteoporosis without current pathological fracture  -     DEXA Bone Density Axial    Primary osteoarthritis of knees, bilateral    Other iron deficiency anemia  -     POC CBC With / Auto Diff    Other orders  -     omeprazole (PrilOSEC) 20 MG capsule; Take 1 capsule by mouth Daily.  -     Td Vaccine Greater Than or Equal To 8yo Preservative Free IM  -     Pneumococcal Polysaccharide Vaccine 23-Valent (PPSV23) Greater Than or Equal To 1yo Subcutaneous / IM        Discussion    AWV.     Direct observation of cognitive ability was evaluated and is normal. Patient was given health advice or handouts on the following:  nutrition, fall prevention, exercise, tobacco cessation. She declines assistance for tob cessation. She is advised to try commit lozenge in place of cigarettes. Advise vascular screening given risk factors for pad and carotid stenosis.  She declines ct lung. She will receive td and pneumovax today and is to get hep A and shingrix from the pharmacy in the future. She had a bone density screen today which reveals osteopenia. She declines therapy for this. She would like to continue mammography and this is ordered. She had elevated wbc but this resolved on repeat testing today. She is healing well from tkr B and will continue exercises as given by physical therapy. She will f/u in 6 months or prn.                 Future Appointments   Date Time Provider Department Center   11/7/2019 10:10 AM Bolivar Glover MD [facility] None   11/14/2019  4:15 PM Larissa York, PTA [facility]   5/5/2020 10:00 AM Maia Whitney MD [facility] None

## 2019-11-07 ENCOUNTER — OFFICE VISIT (OUTPATIENT)
Dept: ORTHOPEDIC SURGERY | Facility: CLINIC | Age: 79
End: 2019-11-07

## 2019-11-07 DIAGNOSIS — Z96.652 HISTORY OF TOTAL KNEE ARTHROPLASTY, LEFT: Primary | ICD-10-CM

## 2019-11-07 PROCEDURE — 99024 POSTOP FOLLOW-UP VISIT: CPT | Performed by: ORTHOPAEDIC SURGERY

## 2019-11-07 PROCEDURE — 73562 X-RAY EXAM OF KNEE 3: CPT | Performed by: ORTHOPAEDIC SURGERY

## 2019-11-07 NOTE — PROGRESS NOTES
Trice Melara : 1940 MRN: [id number] DATE: 2019    DIAGNOSIS: 8 week follow up left total knee      SUBJECTIVE:Patient returns today for 8 week follow up of left total knee replacement. Patient reports doing well with no unusual complaints. Appears to be progressing appropriately.    OBJECTIVE:   Exam:. The incision is well healed. No sign of infection. Range of motion is measured at 2 to 120. The calf is soft and nontender with a negative Homans sign. Strength is progressing and the patient is ambulating appropriately.    DIAGNOSTIC STUDIES  Xrays: 3 views of the left knee (AP, lateral, and sunrise) were ordered and reviewed for evaluation of recent knee replacement. They demonstrate a well positioned, well aligned knee replacement without complicating factors noted. In comparison with previous films there has been no change.    ASSESSMENT: 8 week status post left knee replacement.    PLAN: 1) Continue with PT exercises as prescribed   2) Follow up in 10 months    Bolivar Glover MD  2019

## 2019-11-14 ENCOUNTER — HOSPITAL ENCOUNTER (OUTPATIENT)
Dept: PHYSICAL THERAPY | Facility: HOSPITAL | Age: 79
Setting detail: THERAPIES SERIES
Discharge: HOME | End: 2019-11-14

## 2019-11-14 DIAGNOSIS — M25.562 CHRONIC PAIN OF LEFT KNEE: ICD-10-CM

## 2019-11-14 DIAGNOSIS — Z96.651 S/P TKR (TOTAL KNEE REPLACEMENT), RIGHT: ICD-10-CM

## 2019-11-14 DIAGNOSIS — Z96.652 STATUS POST TOTAL KNEE REPLACEMENT, LEFT: Primary | ICD-10-CM

## 2019-11-14 DIAGNOSIS — G89.29 CHRONIC PAIN OF LEFT KNEE: ICD-10-CM

## 2019-11-14 PROCEDURE — 97110 THERAPEUTIC EXERCISES: CPT

## 2019-11-14 NOTE — THERAPY DISCHARGE NOTE
Outpatient Physical Therapy Ortho Treatment Note/Discharge Summary  [facility]     Patient Name: Trice Melara  : 1940  MRN: [id number]  Today's Date: 2019      Visit Date: 2019    Visit Dx:    ICD-10-CM ICD-9-CM   1. Status post total knee replacement, left Z96.652 V43.65   2. Chronic pain of left knee M25.562 719.46    G89.29 338.29   3. S/P TKR (total knee replacement), right Z96.651 V43.65       Patient Active Problem List   Diagnosis   • Gastroesophageal reflux disease   • Abnormal kidney function   • Primary osteoarthritis of knees, bilateral   • Closed displaced intertrochanteric fracture of right femur (CMS/HCC)   • Osteoporosis   • Status post right knee replacement        Past Medical History:   Diagnosis Date   • Abdominal wall dehiscence 2016    Fascial Dehiscence   • Acute renal failure (CMS/HCC)    • Aspiration pneumonia (CMS/HCC)    • Colovaginal fistula    • Diverticulitis     with paracolonic abscess and colovesical fistula,    • Frequent loose stools    • Gastric ulcer    • GERD (gastroesophageal reflux disease)    • H/O Bowel perforation    • History of diverticular abscess    • History of GI bleed    • Osteoarthritis    • Peritonitis (CMS/HCC) 2016   • Septic shock (CMS/HCC)    • Small bowel anastomotic leak 2016        Past Surgical History:   Procedure Laterality Date   • BREAST CYST EXCISION     • COLON RESECTION  2016    Smal Bowel Resection -Dr. Syd Maharaj   • COLON SURGERY Left     with closure of fistula   • EXPLORATORY LAPAROTOMY  2015    With Extensive Adhesiolysis of over 1 hour to allow conduct of the operation and repair of small intestine-Dr. Syd Maharaj   • EXPLORATORY LAPAROTOMY  2016    Dr. Syd Maharaj   • INCISIONAL HERNIA REPAIR  2016    Incisional Hernia Repair with Fascial Dehiscence Repair with Mesh and Bilateral Component Separation-Dr. Syd Maharaj   • [place] OPEN FIX  "INTER/SUBTROCH FX,IMPLNT Right 7/24/2017    Procedure: FEMUR INTRAMEDULLARY NAILING/RODDING;  Surgeon: Huan Weiss MD;  Location: [facility] OR;  Service: Orthopedics   • TOTAL ABDOMINAL HYSTERECTOMY  1970s   • TOTAL KNEE ARTHROPLASTY Right 4/29/2019    Procedure: RT TOTAL KNEE ARTHROPLASTY WITH NAVIGATION;  Surgeon: Bolivar Glover MD;  Location: [facility] OR;  Service: Orthopedics   • TOTAL KNEE ARTHROPLASTY Left 9/11/2019    Procedure: TOTAL KNEE ARTHROPLASTY;  Surgeon: Bolivar Glover MD;  Location: [facility] OR;  Service: Orthopedics       PT Ortho     Row Name 11/14/19 1600       Subjective Comments    Subjective Comments  MD pleaed with progress.  pt states she is ready to DC PT   -SI       Subjective Pain    Able to rate subjective pain?  yes  -SI    Pre-Treatment Pain Level  1  -SI    Post-Treatment Pain Level  1  -SI    Subjective Pain Comment  \"achiness when the weather cahnges, no pain  -SI       Left Lower Ext    Lt Knee Extension/Flexion AROM  -2  to 120  -SI       MMT Left Lower Ext    Lt Hip Flexion MMT, Gross Movement  (4+/5) good plus  -SI    Lt Hip ABduction MMT, Gross Movement  (4+/5) good plus  -SI    Lt Knee Extension MMT, Gross Movement  (4+/5) good plus  -SI    Lt Knee Flexion MMT, Gross Movement  (4+/5) good plus  -SI    Lt Ankle Plantarflexion MMT, Gross Movement  (4+/5) good plus  -SI    Lt Ankle Dorsiflexion MMT, Gross Movement  (4+/5) good plus  -SI       Transfers    Comment (Transfers)  instruction in up and downn from floor with UE support  -SI       Gait/Stairs Assessment/Training    Handrail Location (Stairs)  right side (ascending)  -SI    Ascending Technique (Stairs)  step-over-step  -SI    Descending Technique (Stairs)  step-over-step  -SI    Comment (Gait/Stairs)  Ind gait withut assist device  -SI      User Key  (r) = Recorded By, (t) = Taken By, (c) = Cosigned By    Initials Name Provider Type    SI Larissa York PTA Physical Therapy Assistant    " "                  PT Assessment/Plan     Row Name 11/14/19 1648          PT Assessment    Assessment Comments  Pt getting oob and doing her full level of ADl's.  She is in with HEP. Pt ready to DC PT  -SI       User Key  (r) = Recorded By, (t) = Taken By, (c) = Cosigned By    Initials Name Provider Type    Larissa Saravia PTA Physical Therapy Assistant              OP Exercises     Row Name 11/14/19 1600             Subjective Comments    Subjective Comments  MD pleaed with progress.  pt states she is ready to DC PT   -SI         Subjective Pain    Able to rate subjective pain?  yes  -SI      Pre-Treatment Pain Level  1  -SI      Post-Treatment Pain Level  1  -SI      Subjective Pain Comment  \"achiness when the weather cahnges, no pain  -SI         Total Minutes    96968 - PT Therapeutic Exercise Minutes  45  -SI         Exercise 1    Exercise Name 1  nustep  -SI      Cueing 1  Verbal  -SI      Time 1  5min  -SI         Exercise 2    Exercise Name 2  QS  -SI      Cueing 2  Verbal  -SI      Reps 2  10  -SI      Time 2  10s  -SI         Exercise 4    Exercise Name 4  MINI quats   -SI      Reps 4  5  -SI      Time 4  10  -SI         Exercise 5    Exercise Name 5  LAQ and SAQ  -SI      Cueing 5  Verbal  -SI      Reps 5  20  -SI      Time 5  3s  -SI         Exercise 6    Exercise Name 6   HS stretch  -SI      Cueing 6  Verbal  -SI      Reps 6  3  -SI      Time 6  20s  -SI         Exercise 7    Exercise Name 7  knee flex stretch stair  -SI      Cueing 7  Verbal  -SI      Reps 7  5  -SI      Time 7  15s  -SI         Exercise 8    Exercise Name 8  heel raise  -SI      Cueing 8  Verbal  -SI      Reps 8  15  -SI         Exercise 9    Exercise Name 9  Hip abd/ER with green TB  -SI      Sets 9  2  -SI      Reps 9  10  -SI         Exercise 10    Exercise Name 10  Chair to chair heel prop for extension  -SI      Sets 10     -SI      Time 10  5 min  -SI         Exercise 11    Exercise Name 11  HS curls red TB  -SI      Reps 11 "  20  -SI         Exercise 12    Exercise Name 12  hip bridge with pillow squeeze  -SI      Reps 12  20  -SI        User Key  (r) = Recorded By, (t) = Taken By, (c) = Cosigned By    Initials Name Provider Type    Larissa Saravia PTA Physical Therapy Assistant                         PT OP Goals     Row Name 11/14/19 1600          PT Short Term Goals    STG Date to Achieve  10/22/19  -SI     STG 1  The pt will demonstrate IND with initial HEP focused on L knee mobility and strength.  -SI     STG 1 Progress  Met  -SI     STG 2  The pt will demonstrate L knee AROM 0-120 for improved functional mobility;  -SI     STG 2 Progress  Partially Met extension -2  -SI     STG 3  Pt will have R knee Mmt grossly 4/5  -SI     STG 3 Progress  Met  -SI        Long Term Goals    LTG Date to Achieve  11/25/19  -SI     LTG 1  The pt will demonstrate IND with progressive HEP focused on return to PLOF and condition management.  -SI     LTG 1 Progress  Met  -SI     LTG 2  The pt will demosntrate L knee strength to at least 4+/5 for improved stair navigation and gait pattern.  -SI     LTG 2 Progress  Met  -SI     LTG 3  The pt will ambulate community distances over even ground without AD for improved communitynavigation.  -SI     LTG 3 Progress  Met  -SI     LTG 4  The pt will navigate a flight of stairs with handrail for improved household navigation.  -SI     LTG 4 Progress  Met  -SI       User Key  (r) = Recorded By, (t) = Taken By, (c) = Cosigned By    Initials Name Provider Type    Larissa Saravia PTA Physical Therapy Assistant                         Time Calculation:   Start Time: 1615  Stop Time: 1655  Time Calculation (min): 40 min  Total Timed Code Minutes- PT: 40 minute(s)  Therapy Charges for Today     Code Description Service Date Service Provider Modifiers Qty    94266807654 HC PT THER PROC EA 15 MIN 11/14/2019 Larissa York PTA GP 3                OP PT Discharge Summary  Date of Discharge: 11/14/19  Reason for  Discharge: All goals achieved  Outcomes Achieved: Able to achieve all goals within established timeline  Discharge Destination: Home with home program  Discharge Instructions/Additional Comments: HEP for stretching  daily, 3 times a week strengtheing.  elevate and ice as needed      Larissa York, PTA  11/14/2019

## 2019-11-21 ENCOUNTER — HOSPITAL ENCOUNTER (OUTPATIENT)
Dept: MAMMOGRAPHY | Facility: HOSPITAL | Age: 79
Discharge: HOME | End: 2019-11-21
Admitting: INTERNAL MEDICINE

## 2019-11-21 DIAGNOSIS — Z12.31 BREAST CANCER SCREENING BY MAMMOGRAM: ICD-10-CM

## 2019-11-21 PROCEDURE — 77067 SCR MAMMO BI INCL CAD: CPT

## 2019-11-21 PROCEDURE — 77063 BREAST TOMOSYNTHESIS BI: CPT

## 2020-01-17 DIAGNOSIS — Z96.652 HISTORY OF TOTAL KNEE ARTHROPLASTY, LEFT: Primary | ICD-10-CM

## 2020-01-21 RX ORDER — HYDROCODONE BITARTRATE AND ACETAMINOPHEN 7.5; 325 MG/1; MG/1
TABLET ORAL
OUTPATIENT
Start: 2020-01-21

## 2020-05-05 ENCOUNTER — OFFICE VISIT (OUTPATIENT)
Dept: INTERNAL MEDICINE | Facility: CLINIC | Age: 80
End: 2020-05-05

## 2020-05-05 DIAGNOSIS — K21.9 GASTROESOPHAGEAL REFLUX DISEASE, ESOPHAGITIS PRESENCE NOT SPECIFIED: ICD-10-CM

## 2020-05-05 DIAGNOSIS — F17.200 TOBACCO DEPENDENCE: ICD-10-CM

## 2020-05-05 DIAGNOSIS — M17.0 PRIMARY OSTEOARTHRITIS OF KNEES, BILATERAL: Primary | ICD-10-CM

## 2020-05-05 PROCEDURE — 99443 PR PHYS/QHP TELEPHONE EVALUATION 21-30 MIN: CPT | Performed by: INTERNAL MEDICINE

## 2020-05-05 NOTE — PROGRESS NOTES
"Chief Complaint   Patient presents with   • Osteoarthritis   • Heartburn   • Nicotine Dependence       History of Present Illness   Trice Melara is a 79 y.o. female presents for routine visit by telemedicine in place of an office visit due to covid-19 pandemic. Patient consents to visit by telephone. She reports that she is doing well. She had TKR 4/19 and 9/19 and reports that they are doing \"amazing\". In the past she was having diarrhea. This has resolved. Patient reports that she knows when to take meds if having loose stooling and she has good response to med when needed. Patient has nicotine dependency. She is not interested in smoking sensation. She has been counseled in regards to this. She reports eating a healthy diet and is getting routine movement. She had mildly impaired fasting glucose. Labs from 10/31/19 reviewed. All wnl x elevated wbc. This was normal on repeat testing.       The following portions of the patient's history were reviewed and updated as appropriate: allergies, current medications, past family history, past medical history, past social history, past surgical history and problem list.  Current Outpatient Medications on File Prior to Visit   Medication Sig Dispense Refill   • diphenhydrAMINE (BENADRYL) 25 mg capsule Take 25 mg by mouth 2 (Two) Times a Day As Needed.     • diphenoxylate-atropine (LOMOTIL) 2.5-0.025 MG per tablet Take 1 tablet by mouth 4 (Four) Times a Day As Needed.     • omeprazole (PrilOSEC) 20 MG capsule Take 1 capsule by mouth Daily. 90 capsule 3     No current facility-administered medications on file prior to visit.      Review of Systems   Constitutional: Negative.    HENT: Negative.    Eyes: Negative.    Respiratory: Negative.    Cardiovascular: Negative.    Gastrointestinal: Negative.    Endocrine: Negative.    Genitourinary: Negative.    Musculoskeletal: Negative.    Skin: Negative.    Allergic/Immunologic: Negative.    Neurological: Negative.    Hematological: " "Negative.    Psychiatric/Behavioral: Negative.        Objective   Physical Exam   Constitutional: She is oriented to person, place, and time. She appears well-developed and well-nourished.   HENT:   Head: Normocephalic and atraumatic.   Neurological: She is alert and oriented to person, place, and time.   Skin: Skin is warm and dry.   Psychiatric: She has a normal mood and affect. Her behavior is normal. Judgment and thought content normal.   Nursing note and vitals reviewed.       There were no vitals taken for this visit.    Assessment/Plan   Diagnoses and all orders for this visit:    Primary osteoarthritis of knees, bilateral    Tobacco dependence    Gastroesophageal reflux disease, esophagitis presence not specified      Patient w/ OA B knees. She is doing well s/p replacement. She is encouraged to continue to strengthen her legs and engage in healthy physical activity. She is advised to d/c smoking and is offered assistance with this. She is not interested in stopping at this time and does not desire ct lung cancer screening. She will continue omeprazole for TAINA and may explore alternative in the future given osteoporosis. Will check renal function w/ next visit and she is advised to schedule an AWV in 6 months time. She is advised to get Hep A and shingrix vaccinations and patient reports that she \"wrote this down\". Total visit 30 min.            "

## 2020-05-15 NOTE — THERAPY TREATMENT NOTE
Outpatient Physical Therapy Ortho Treatment Note  [facility]     Patient Name: Trice Melara  : 1940  MRN: [id number]  Today's Date: 10/3/2019      Visit Date: 10/03/2019    Visit Dx:    ICD-10-CM ICD-9-CM   1. Status post total knee replacement, left Z96.652 V43.65   2. Chronic pain of left knee M25.562 719.46    G89.29 338.29   3. Difficulty walking R26.2 719.7       Patient Active Problem List   Diagnosis   • Gastroesophageal reflux disease   • Knee pain   • Iron deficiency anemia   • Abnormal kidney function   • Acute renal failure (CMS/HCC)   • Arthritis of knee   • Arthritis   • Osteoarthrosis, localized, primary, knee   • Primary osteoarthritis of knees, bilateral   • Closed displaced intertrochanteric fracture of right femur (CMS/HCC)   • Fall   • Osteoporosis   • Primary osteoarthritis of right knee   • OA (osteoarthritis) of knee   • Primary osteoarthritis of left knee   • Status post right knee replacement        Past Medical History:   Diagnosis Date   • Abdominal wall dehiscence 2016    Fascial Dehiscence   • Acute renal failure (CMS/HCC)    • Aspiration pneumonia (CMS/HCC)    • Colovaginal fistula    • Diverticulitis     with paracolonic abscess and colovesical fistula,    • Frequent loose stools    • Gastric ulcer    • GERD (gastroesophageal reflux disease)    • H/O Bowel perforation    • History of diverticular abscess    • History of GI bleed    • Osteoarthritis    • Peritonitis (CMS/HCC) 2016   • Septic shock (CMS/[facility])    • Small bowel anastomotic leak 2016        Past Surgical History:   Procedure Laterality Date   • BREAST CYST EXCISION     • COLON RESECTION  2016    Smal Bowel Resection -Dr. Syd Maharaj   • COLON SURGERY Left     with closure of fistula   • EXPLORATORY LAPAROTOMY  2015    With Extensive Adhesiolysis of over 1 hour to allow conduct of the operation and repair of small intestine-Dr. Syd Maharaj   •  EXPLORATORY LAPAROTOMY  01/08/2016    Dr. Syd Maharaj   • INCISIONAL HERNIA REPAIR  01/08/2016    Incisional Hernia Repair with Fascial Dehiscence Repair with Mesh and Bilateral Component Separation-Dr. Syd Maharaj   • CT OPEN FIX INTER/SUBTROCH FX,IMPLNT Right 7/24/2017    Procedure: FEMUR INTRAMEDULLARY NAILING/RODDING;  Surgeon: Huan Weiss MD;  Location: [facility] MAIN OR;  Service: Orthopedics   • TOTAL ABDOMINAL HYSTERECTOMY  1970s   • TOTAL KNEE ARTHROPLASTY Right 4/29/2019    Procedure: RT TOTAL KNEE ARTHROPLASTY WITH NAVIGATION;  Surgeon: Bolivar Glover MD;  Location: [facility] MAIN OR;  Service: Orthopedics   • TOTAL KNEE ARTHROPLASTY Left 9/11/2019    Procedure: TOTAL KNEE ARTHROPLASTY;  Surgeon: Bolivar Glover MD;  Location: [facility] OR;  Service: Orthopedics       PT Ortho     Row Name 10/03/19 1000       Subjective Comments    Subjective Comments  Pain at night but improving..  RTW part time  -SI       Subjective Pain    Able to rate subjective pain?  yes  -SI    Subjective Pain Comment  laeral knee with stretching  -SI       Right Lower Ext    Rt Knee Extension/Flexion AROM  -9  -SI       Left Lower Ext    Lt Knee Extension/Flexion AROM  -4 to 110  -SI    Row Name 10/01/19 1000       Posture/Observations    Observations  Incision healing  -RS    Posture/Observations Comments  minimal to no edema noted  -RS       General ROM    RT Lower Ext  Rt Knee Extension/Flexion  -RS    LT Lower Ext  Lt Knee Extension/Flexion  -RS       Right Lower Ext    Rt Knee Extension/Flexion AROM  lacking   -RS       Left Lower Ext    Lt Knee Extension/Flexion AROM  lacking 4-100  -RS       MMT (Manual Muscle Testing)    Rt Lower Ext  Rt Hip Flexion;Rt Hip ABduction;Rt Knee Extension;Rt Knee Flexion;Rt Ankle Plantarflexion;Rt Ankle Dorsiflexion  -RS    Lt Lower Ext  Lt Hip Flexion;Lt Hip ABduction;Lt Knee Extension;Lt Knee Flexion;Lt Ankle Plantarflexion;Lt Ankle Dorsiflexion  -RS       MMT Right Lower Ext     Rt Hip Flexion MMT, Gross Movement  (4+/5) good plus  -RS    Rt Hip ABduction MMT, Gross Movement  (4/5) good  -RS    Rt Knee Extension MMT, Gross Movement  (4+/5) good plus  -RS    Rt Knee Flexion MMT, Gross Movement  (4+/5) good plus  -RS    Rt Ankle Plantarflexion MMT, Gross Movement  (4+/5) good plus  -RS    Rt Ankle Dorsiflexion MMT, Gross Movement  (5/5) normal  -RS       MMT Left Lower Ext    Lt Hip Flexion MMT, Gross Movement  (4/5) good  -RS    Lt Hip ABduction MMT, Gross Movement  (4/5) good  -RS    Lt Knee Extension MMT, Gross Movement  (4+/5) good plus  -RS    Lt Knee Flexion MMT, Gross Movement  (4/5) good  -RS    Lt Ankle Plantarflexion MMT, Gross Movement  (4+/5) good plus  -RS    Lt Ankle Dorsiflexion MMT, Gross Movement  (4+/5) good plus  -RS       Sensation    Sensation WNL?  WNL  -RS       Gait/Stairs Assessment/Training    Comment (Gait/Stairs)  Pt ambulates with SPC, decreased stance on LLE compared to RLE, decreased gladis, increased R lateral trunk lean  -RS      User Key  (r) = Recorded By, (t) = Taken By, (c) = Cosigned By    Initials Name Provider Type    Larissa Saravia PTA Physical Therapy Assistant    RS Alondra Daugherty, PT Physical Therapist                      PT Assessment/Plan     Row Name 10/03/19 1004          PT Assessment    Assessment Comments  To stertch HS long sitting and to do on both legs.  Pt doing excellent with ROM at 3.5 weeks post-op  -SI       User Key  (r) = Recorded By, (t) = Taken By, (c) = Cosigned By    Initials Name Provider Type    Larissa Saravia PTA Physical Therapy Assistant            OP Exercises     Row Name 10/03/19 1000 10/03/19 0900          Subjective Comments    Subjective Comments  Pain at night but improving..  RTW part time  -SI  --        Subjective Pain    Able to rate subjective pain?  yes  -SI  --     Subjective Pain Comment  laeral knee with stretching  -SI  --        Total Minutes    19764 - PT Therapeutic Exercise Minutes  --   45  -SI        Exercise 1    Exercise Name 1  --  nustep  -SI     Cueing 1  --  Verbal  -SI     Time 1  --  5min  -SI        Exercise 2    Exercise Name 2  --  QS  -SI     Cueing 2  --  Verbal  -SI     Reps 2  --  10  -SI     Time 2  --  10s  -SI        Exercise 3    Exercise Name 3  --  heel slide  -SI     Cueing 3  --  Verbal  -SI     Reps 3  --  10  -SI     Time 3  --  5s  -SI        Exercise 4    Exercise Name 4  --  glute set  -SI     Cueing 4  --  Verbal  -SI     Reps 4  --  10  -SI     Time 4  --  5s  -SI        Exercise 5    Exercise Name 5  --  LAQ and SAQ  -SI     Cueing 5  --  Verbal  -SI     Reps 5  --  20  -SI     Time 5  --  3s  -SI     Additional Comments  --  2lbs  -SI        Exercise 6    Exercise Name 6  --   HS stretch  -SI     Cueing 6  --  Verbal  -SI     Reps 6  --  3  -SI     Time 6  --  20s  -SI        Exercise 7    Exercise Name 7  --  knee flex stretch stair  -SI     Cueing 7  --  Verbal  -SI     Reps 7  --  5  -SI     Time 7  --  15s  -SI        Exercise 8    Exercise Name 8  --  heel raise  -SI     Cueing 8  --  Verbal  -SI     Reps 8  --  15  -SI       User Key  (r) = Recorded By, (t) = Taken By, (c) = Cosigned By    Initials Name Provider Type    Larissa Saravia PTA Physical Therapy Assistant                                          Time Calculation:   Start Time: 0915  Stop Time: 1000  Time Calculation (min): 45 min  Total Timed Code Minutes- PT: 45 minute(s)  Therapy Charges for Today     Code Description Service Date Service Provider Modifiers Qty    23366228376 HC PT THER PROC EA 15 MIN 10/3/2019 Larissa York PTA GP 3                    Larissa York PTA  10/3/2019      Patient

## 2020-09-10 ENCOUNTER — OFFICE VISIT (OUTPATIENT)
Dept: ORTHOPEDIC SURGERY | Facility: CLINIC | Age: 80
End: 2020-09-10

## 2020-09-10 VITALS — BODY MASS INDEX: 24.35 KG/M2 | WEIGHT: 124 LBS | HEIGHT: 60 IN | TEMPERATURE: 98.6 F

## 2020-09-10 DIAGNOSIS — M25.562 PAIN IN BOTH KNEES, UNSPECIFIED CHRONICITY: Primary | ICD-10-CM

## 2020-09-10 DIAGNOSIS — M25.561 PAIN IN BOTH KNEES, UNSPECIFIED CHRONICITY: Primary | ICD-10-CM

## 2020-09-10 PROCEDURE — 99212 OFFICE O/P EST SF 10 MIN: CPT | Performed by: ORTHOPAEDIC SURGERY

## 2020-09-10 PROCEDURE — 73562 X-RAY EXAM OF KNEE 3: CPT | Performed by: ORTHOPAEDIC SURGERY

## 2020-09-10 NOTE — PROGRESS NOTES
"Trice Melara : 1940 MRN: [id number] DATE: 9/10/2020    DIAGNOSIS: Annual follow up left total knee      SUBJECTIVE:Patient returns today for  1 year follow up of left total knee replacement. Patient reports doing well with no unusual complaints. Denies any limitations due to the knee.    OBJECTIVE:    Temp 98.6 °F (37 °C) (Temporal)   Ht 151.8 cm (59.76\")   Wt 56.2 kg (124 lb)   BMI 24.41 kg/m²   Family History   Problem Relation Age of Onset   • Dementia Mother    • Thyroid disease Mother    • Leukemia Father    • Thyroid disease Brother    • Malig Hyperthermia Neg Hx      Past Medical History:   Diagnosis Date   • Abdominal wall dehiscence 2016    Fascial Dehiscence   • Acute renal failure (CMS/HCC)    • Aspiration pneumonia (CMS/HCC)    • Colovaginal fistula    • Diverticulitis     with paracolonic abscess and colovesical fistula,    • Frequent loose stools    • Gastric ulcer    • GERD (gastroesophageal reflux disease)    • H/O Bowel perforation    • History of diverticular abscess    • History of GI bleed    • Osteoarthritis    • Peritonitis (CMS/HCC) 2016   • Septic shock (CMS/HCC)    • Small bowel anastomotic leak 2016     Past Surgical History:   Procedure Laterality Date   • BREAST CYST EXCISION     • COLON RESECTION  2016    Smal Bowel Resection -Dr. Syd Maharaj   • COLON SURGERY Left     with closure of fistula   • EXPLORATORY LAPAROTOMY  2015    With Extensive Adhesiolysis of over 1 hour to allow conduct of the operation and repair of small intestine-Dr. Syd Maharaj   • EXPLORATORY LAPAROTOMY  2016    Dr. Syd Maharaj   • INCISIONAL HERNIA REPAIR  2016    Incisional Hernia Repair with Fascial Dehiscence Repair with Mesh and Bilateral Component Separation-Dr. Syd Maharaj   • NE OPEN FIX INTER/SUBTROCH FX,IMPLNT Right 2017    Procedure: FEMUR INTRAMEDULLARY NAILING/RODDING;  Surgeon: Huan Weiss MD;  Location: " [facility] OR;  Service: Orthopedics   • TOTAL ABDOMINAL HYSTERECTOMY  1970s   • TOTAL KNEE ARTHROPLASTY Right 4/29/2019    Procedure: RT TOTAL KNEE ARTHROPLASTY WITH NAVIGATION;  Surgeon: Bolivar Glover MD;  Location: [facility] OR;  Service: Orthopedics   • TOTAL KNEE ARTHROPLASTY Left 9/11/2019    Procedure: TOTAL KNEE ARTHROPLASTY;  Surgeon: Bolivar Glover MD;  Location: [facility];  Service: Orthopedics     Social History     Socioeconomic History   • Marital status:      Spouse name: Not on file   • Number of children: Not on file   • Years of education: Not on file   • Highest education level: Not on file   Occupational History     Comment: Part-time billing for a psychotherapist   Tobacco Use   • Smoking status: Current Some Day Smoker     Packs/day: 0.25     Years: 50.00     Pack years: 12.50     Types: Cigarettes   • Smokeless tobacco: Never Used   • Tobacco comment: 5-6 CIG DAY   Substance and Sexual Activity   • Alcohol use: Yes     Comment: 1-2 per evening   • Drug use: No   • Sexual activity: Defer     Review of Systems - a 14 point review of systems was performed. All systems were negative.    Exam:. The incision is well healed. Range of motion is measured at 0 to 125. The calf is soft and nontender with a negative Homans sign. Alignment is neutral. Good quad strength. There is no evidence of varus/valgus or flexion instability. No effusion. Intact to light touch with palpable distal pulses.     DIAGNOSTIC STUDIES  Xrays: 3 views of the left knee (AP, lateral, and sunrise) were ordered and reviewed for evaluation of recent knee replacement. They demonstrate a well positioned, well aligned knee replacement without complicating factors noted. In comparison with previous films there has been no change.    ASSESSMENT: Annual follow up left knee replacement.    PLAN:  Continue activities as tolerated    Follow up PRN      Bolivar Glover MD  9/10/2020

## 2020-10-28 RX ORDER — OMEPRAZOLE 20 MG/1
20 CAPSULE, DELAYED RELEASE ORAL DAILY
Qty: 90 CAPSULE | Refills: 0 | Status: SHIPPED | OUTPATIENT
Start: 2020-10-28 | End: 2021-01-24

## 2021-01-24 RX ORDER — OMEPRAZOLE 20 MG/1
20 CAPSULE, DELAYED RELEASE ORAL DAILY
Qty: 90 CAPSULE | Refills: 0 | Status: SHIPPED | OUTPATIENT
Start: 2021-01-24 | End: 2021-04-26

## 2021-02-17 DIAGNOSIS — Z00.00 HEALTHCARE MAINTENANCE: Primary | ICD-10-CM

## 2021-02-19 NOTE — TELEPHONE ENCOUNTER
[facility] Consult Note    Date of Consult: 6/10/2018    Reason for Consult: Nausea, vomiting, abdominal pain    History of Present Illness: This is a 46-year-old female with history of irregular bowel habits with constipation and diarrhea and high cholesterol who was consulted for the above.  The patient states she was in her regular state of health yesterday and went out for ice cream in the evening.  This morning when she woke up she had diffuse abdominal cramping and sharp upper abdominal pain.  This was associated with nausea and vomiting.  She did not eat anything today.  He has had multiple bilious emesis.  Her pain has been constant and she describes it as a band across her upper abdomen radiating to her back.  She did have one loose stool this morning that was nonbloody she denies any significant diarrhea.  She does report to have fevers and chills.  She denies eating out or having anything different or strange from her diet, she denies any sick contacts, she denies any recent travel, she denies any recent antibiotic use.  CT on arrival reveals moderate stool burden, mild prominent small bowel loops that are fluid-filled without distention suggestive of enteritis with underlying mesenteric fatty infiltration.  She does also describe having rectal prolapse in which she has to apply some pressure to her perineum in order to successfully pass adequate stool, her stools are also noted to be thin.  She does not believe this is contributing to any of her symptoms.    Past Medical History: Constipation, diarrhea, rectal prolapse, high cholesterol    Past Surgical History: Tonsillectomy, colonoscopy 2013 with polyp and hemorrhoids    Family History: Denies family history of GI malignancy or diseases     Social History: Denies alcohol or tobacco use    Allergies:  Allergies (1) Active Reaction  Latex rash/resp      Medications: Medication Reconciliation reviewed    Review of  Pt informed, stated understanding.    Systems:    Constitutional: Positive fevers and chills, weight is stable, appetite decreased    HEENT: denies changes in vision or hearing    Respiratory: denies shortness of breath, wheezing, or cough    Cardiovascular: denies chest pain, palpitations, or dizziness    Gastrointestinal: see HPI    Genitourinary: denies dysuria or hematuria    Musculoskeletal: denies joint aches, pain, or swelling    Skin: denies jaundice, rashes, or lesions    Neurological: denies numbness or tingling    Psychiatric: denies anxiety or depression    Heme/lymph: denies bleeding or bruising      Physical Examination:    Constitutional: well developed, well nourished, appears mildly uncomfortable    Vital signs: Vital Signs (last 24 hrs)_____ Last Charted___________Minimum____________ Maximum____________  Temp    97.9  (CHIKIS 10 16:11) 97.9  (CHIKIS 10 16:11) 97.9  (CHIKIS 10 16:11)  Heart Rate   84  (CHIKIS 10 16:11) 67  (CHIKIS 10 13:53) 86  (JUN 10 12:24)  Resp Rate       16  (CHIKIS 10 16:11) 16  (CHIKIS 10 16:11) H 26 (JUN 10 12:24)  SBP    136  (CHIKIS 10 16:11) 124  (JUN 10 13:53) 136  (JUN 10 12:24)  DBP    85  (CHIKIS 10 16:11) 63  (CHIKIS 10 13:53) 85  (CHIKIS 10 12:24)      Eyes: non icteric, pink conjunctiva    ENT: no oral lesions, moist mucous membranes     Neck: supple, no lymphadenopathy    Respiratory: lungs clear to auscultation, cough absent    Cardiovascular: S1/S2, regular rate and rhythm, no murmurs    Gastrointestinal: abdomen soft, non distended, tenderness with palpation, bowel sounds present; no massess    Rectal: deferred    Musculoskeletal: muscle mass appropriate for age    Skin: no jaundice, rashes or lesions    Psychiatric: alert and oriented x3, appropriate    Radiology: CT reviewed  IMPRESSION: Please see body of report.  Appendix is normal.  There is  no evidence of diverticulitis.  There are no calculi in the  pelvicalyceal systems or ureters, there are no changes of obstruction  or hydronephrosis.  The patient has mild prominence of  loops of small  bowel in the left upper and mid abdomen that are filled with the  fluid, but without convincing abnormal dilatation.  The patient has a  few loops of small bowel in the right mid abdomen, extending into the  upper pelvis that also have a similar appearance.  These are not  dilated, but are filled with fluid.  The patient has abnormal  infiltration of the mesenteric fat adjacent to these loops of small  bowel in the right mid abdomen.  Infiltration of fat indicates  infection or inflammation.  Appearance on CT is not suggestive of a  closed loop obstruction or a mesenteric twist.  Suggest clinical  correlation.  Findings may be related to enteritis.  There are no  findings of bowel wall thickening, there is no adjacent inflammatory  process.  Findings are not clearly specific for  small bowel  obstruction at this time.  There is no evidence of pneumoperitoneum or  ascites.  Suggest clinical correlation, follow-up studies as  indicated.  Findings were communicated to Millicent Cummings, nurse  practitioner in the emergency room, at 1:45 PM on 06/10/2018.      Labs: Labs (Last four charted values)  WBC                  H 13.7 (CHIKIS 10)   Hgb                  13.8 (CHIKIS 10)   Hct                  41 (JUN 10)   Plt                  305 (JUN 10)   Na                   139 (JUN 10)   K                    L 3.3 (JUN 10)   CO2                  23 (JUN 10)   Cl                   104 (JUN 10)   Cr                   0.82 (JUN 10)   BUN                  12 (JUN 10)   Glucose              H 119 (JUN 10)   Ca                   9.4 (JUN 10)   LFTs are normal    Assessment:   1.  Diffuse abdominal pain  2.  Nausea and vomiting  3.  Lactic acidosis and mild leukocytosis  4.  CT suggestive of enteritis with mild fatty infiltration, moderate stool burden  5.  Rectal prolapse  This is a 46-year-old female with no major medical history admitted with diffuse abdominal pain, nausea and vomiting found to have mild leukocytosis and  lactic acidosis.  CT with evidence of enteritis without abnormal dilation however there is infiltration of fat in the right mid abdomen.  No evidence of obstruction or mesenteric twist.  She is having bowel movements.  If diarrhea occurs will obtain stool studies for further evaluation.  Will start prophylactic antibiotics.  Etiology is not clear possibly related underlying acute infectious or inflammatory process less likely ischemic.  Continue with conservative measures n.p.o., IV fluids, pain control, nausea control, IV antibiotics.  Will check labs in a.m. and continue to monitor.  Discussed with Dr. Sánchez.  Will follow.    Plan:   N.p.o. for now-ice chips and meds okay  IV fluids  Pain and nausea control  IV antibiotics  Continue to monitor labs  Hopefully symptoms improve with conservative measures  Stool studies if diarrhea   Will follow    Thank you for allowing us to participate in the patients care. Please do not hesitate to call us with any questions or concerns.    MICA Herrera, [facility]                  Pt seen and examined; agree with above  45 yo WF with sudden onser diffuse abdominal pain with associated nausea and vomiting  -CT scan is non-specific and likely c/w infectious enteritis  -RUQ U/S shows no pathology  -will tx with NPO, IVF, and IV abx  -Will recheck AAS in AM and continue anti-emetics  -Case d/w Dr. Montoya

## 2021-02-25 LAB
ALBUMIN SERPL-MCNC: 4.1 G/DL (ref 3.5–5.2)
ALBUMIN/GLOB SERPL: 1.8 G/DL
ALP SERPL-CCNC: 77 U/L (ref 39–117)
ALT SERPL-CCNC: 13 U/L (ref 1–33)
APPEARANCE UR: CLEAR
AST SERPL-CCNC: 22 U/L (ref 1–32)
BACTERIA #/AREA URNS HPF: ABNORMAL /HPF
BASOPHILS # BLD AUTO: 0.1 10*3/MM3 (ref 0–0.2)
BASOPHILS NFR BLD AUTO: 1.7 % (ref 0–1.5)
BILIRUB SERPL-MCNC: 0.4 MG/DL (ref 0–1.2)
BILIRUB UR QL STRIP: NEGATIVE
BUN SERPL-MCNC: 8 MG/DL (ref 8–23)
BUN/CREAT SERPL: 11.3 (ref 7–25)
CALCIUM SERPL-MCNC: 9.2 MG/DL (ref 8.6–10.5)
CASTS URNS MICRO: ABNORMAL
CHLORIDE SERPL-SCNC: 102 MMOL/L (ref 98–107)
CHOLEST SERPL-MCNC: 164 MG/DL (ref 0–200)
CO2 SERPL-SCNC: 30.6 MMOL/L (ref 22–29)
COLOR UR: YELLOW
CREAT SERPL-MCNC: 0.71 MG/DL (ref 0.57–1)
EOSINOPHIL # BLD AUTO: 0.37 10*3/MM3 (ref 0–0.4)
EOSINOPHIL NFR BLD AUTO: 6.3 % (ref 0.3–6.2)
EPI CELLS #/AREA URNS HPF: ABNORMAL /HPF
ERYTHROCYTE [DISTWIDTH] IN BLOOD BY AUTOMATED COUNT: 12.7 % (ref 12.3–15.4)
GLOBULIN SER CALC-MCNC: 2.3 GM/DL
GLUCOSE SERPL-MCNC: 80 MG/DL (ref 65–99)
GLUCOSE UR QL: NEGATIVE
HCT VFR BLD AUTO: 44.1 % (ref 34–46.6)
HDLC SERPL-MCNC: 42 MG/DL (ref 40–60)
HGB BLD-MCNC: 14.1 G/DL (ref 12–15.9)
HGB UR QL STRIP: ABNORMAL
IMM GRANULOCYTES # BLD AUTO: 0.03 10*3/MM3 (ref 0–0.05)
IMM GRANULOCYTES NFR BLD AUTO: 0.5 % (ref 0–0.5)
KETONES UR QL STRIP: NEGATIVE
LDLC SERPL CALC-MCNC: 101 MG/DL (ref 0–100)
LEUKOCYTE ESTERASE UR QL STRIP: ABNORMAL
LYMPHOCYTES # BLD AUTO: 1.71 10*3/MM3 (ref 0.7–3.1)
LYMPHOCYTES NFR BLD AUTO: 29 % (ref 19.6–45.3)
MCH RBC QN AUTO: 27.6 PG (ref 26.6–33)
MCHC RBC AUTO-ENTMCNC: 32 G/DL (ref 31.5–35.7)
MCV RBC AUTO: 86.3 FL (ref 79–97)
MONOCYTES # BLD AUTO: 0.75 10*3/MM3 (ref 0.1–0.9)
MONOCYTES NFR BLD AUTO: 12.7 % (ref 5–12)
NEUTROPHILS # BLD AUTO: 2.94 10*3/MM3 (ref 1.7–7)
NEUTROPHILS NFR BLD AUTO: 49.8 % (ref 42.7–76)
NITRITE UR QL STRIP: NEGATIVE
NRBC BLD AUTO-RTO: 0 /100 WBC (ref 0–0.2)
PH UR STRIP: 6 [PH] (ref 5–8)
PLATELET # BLD AUTO: 227 10*3/MM3 (ref 140–450)
POTASSIUM SERPL-SCNC: 3.8 MMOL/L (ref 3.5–5.2)
PROT SERPL-MCNC: 6.4 G/DL (ref 6–8.5)
PROT UR QL STRIP: NEGATIVE
RBC # BLD AUTO: 5.11 10*6/MM3 (ref 3.77–5.28)
RBC #/AREA URNS HPF: ABNORMAL /HPF
SODIUM SERPL-SCNC: 145 MMOL/L (ref 136–145)
SP GR UR: 1.01 (ref 1–1.03)
TRIGL SERPL-MCNC: 115 MG/DL (ref 0–150)
TSH SERPL DL<=0.005 MIU/L-ACNC: 2.84 UIU/ML (ref 0.27–4.2)
UROBILINOGEN UR STRIP-MCNC: ABNORMAL MG/DL
VLDLC SERPL CALC-MCNC: 21 MG/DL (ref 5–40)
WBC # BLD AUTO: 5.9 10*3/MM3 (ref 3.4–10.8)
WBC #/AREA URNS HPF: ABNORMAL /HPF

## 2021-03-01 ENCOUNTER — OFFICE VISIT (OUTPATIENT)
Dept: INTERNAL MEDICINE | Facility: CLINIC | Age: 81
End: 2021-03-01

## 2021-03-01 VITALS
BODY MASS INDEX: 27.48 KG/M2 | WEIGHT: 140 LBS | HEART RATE: 85 BPM | SYSTOLIC BLOOD PRESSURE: 136 MMHG | HEIGHT: 60 IN | DIASTOLIC BLOOD PRESSURE: 78 MMHG

## 2021-03-01 DIAGNOSIS — M17.0 PRIMARY OSTEOARTHRITIS OF KNEES, BILATERAL: ICD-10-CM

## 2021-03-01 DIAGNOSIS — Z12.31 ENCOUNTER FOR SCREENING MAMMOGRAM FOR MALIGNANT NEOPLASM OF BREAST: ICD-10-CM

## 2021-03-01 DIAGNOSIS — K21.9 GASTROESOPHAGEAL REFLUX DISEASE, UNSPECIFIED WHETHER ESOPHAGITIS PRESENT: ICD-10-CM

## 2021-03-01 DIAGNOSIS — F17.200 TOBACCO DEPENDENCE: ICD-10-CM

## 2021-03-01 DIAGNOSIS — Z00.00 HEALTHCARE MAINTENANCE: Primary | ICD-10-CM

## 2021-03-01 PROCEDURE — G0439 PPPS, SUBSEQ VISIT: HCPCS | Performed by: INTERNAL MEDICINE

## 2021-03-01 PROCEDURE — 99214 OFFICE O/P EST MOD 30 MIN: CPT | Performed by: INTERNAL MEDICINE

## 2021-03-01 RX ORDER — DESONIDE 0.5 MG/G
OINTMENT TOPICAL 2 TIMES DAILY
Qty: 30 G | Refills: 1 | Status: SHIPPED | OUTPATIENT
Start: 2021-03-01

## 2021-03-01 NOTE — PROGRESS NOTES
Subjective   AWV    Trice Melara is a 80 y.o. female who presents for an annual wellness visit as well as check up of routine conditions.   History of Present Illness   She has TAINA. She takes low dose omeprazole nightly with good benefit. She has osteopenia. She is a smoker 4 cigarettes daily. She is not interested in stopping. She is not interested in a CT scan. She is eating a healthy diet. She reports less activity related to the pandemic. She does get routine hcm. She has a good mood.             Compared to one year ago, the patient feels his physical health is the same.  Compared to one year ago, the patient feels his mental health is the same.    Reviewed chart for potential of high risk medication in the elderly: yes  Reviewed chart for potential of harmful drug interactions in the elderly:yes    Discussed the patient's BMI with him. The BMI is in the acceptable range.    Current medical providers:  Patient Care Team:  Maia Whitney MD as PCP - General (Internal Medicine)  Rey Madrid MD as Consulting Physician (Hematology and Oncology)  Bolivar Glover MD as Surgeon (Orthopedic Surgery)    Review of Systems   Constitutional: Negative.    HENT: Negative.    Eyes: Negative.    Respiratory: Negative.    Cardiovascular: Positive for leg swelling.        Mild leg swelling     Gastrointestinal: Negative.    Endocrine: Negative.    Genitourinary: Negative.    Musculoskeletal: Positive for arthralgias.   Skin: Negative.    Allergic/Immunologic: Negative.    Neurological: Negative.    Hematological: Negative.    Psychiatric/Behavioral: Negative.        The following portions of the patient's history were reviewed and updated as appropriate: allergies, current medications, past family history, past medical history, past social history, past surgical history and problem list.  Health maintenance tab was reviewed and updated with the patient.       Patient Active Problem List    Diagnosis Date Noted   • Status  post right knee replacement 09/11/2019   • Closed displaced intertrochanteric fracture of right femur (CMS/HCC) 07/24/2017   • Osteoporosis 07/24/2017   • Primary osteoarthritis of knees, bilateral 12/20/2016   • Gastroesophageal reflux disease 03/03/2016   • Abnormal kidney function 03/03/2016       Past Medical History:   Diagnosis Date   • Abdominal wall dehiscence 01/08/2016    Fascial Dehiscence   • Acute renal failure (CMS/[facility])    • Aspiration pneumonia (CMS/[facility])    • Colovaginal fistula 2004   • Diverticulitis 2010    with paracolonic abscess and colovesical fistula, 2004   • Frequent loose stools    • Gastric ulcer    • GERD (gastroesophageal reflux disease)    • H/O Bowel perforation 2004   • History of diverticular abscess    • History of GI bleed 2010   • Osteoarthritis    • Peritonitis (CMS/[facility]) 01/2016   • Septic shock (CMS/[facility])    • Small bowel anastomotic leak 01/08/2016       Past Surgical History:   Procedure Laterality Date   • BREAST CYST EXCISION     • COLON RESECTION  01/08/2016    Smal Bowel Resection -Dr. Syd Maharaj   • COLON SURGERY Left 2010    with closure of fistula   • EXPLORATORY LAPAROTOMY  12/31/2015    With Extensive Adhesiolysis of over 1 hour to allow conduct of the operation and repair of small intestine-Dr. Syd Maharaj   • EXPLORATORY LAPAROTOMY  01/08/2016    Dr. Syd Maharaj   • INCISIONAL HERNIA REPAIR  01/08/2016    Incisional Hernia Repair with Fascial Dehiscence Repair with Mesh and Bilateral Component Separation-Dr. Syd Maharaj   • CO OPEN FIX INTER/SUBTROCH FX,IMPLNT Right 7/24/2017    Procedure: FEMUR INTRAMEDULLARY NAILING/RODDING;  Surgeon: Huan Weiss MD;  Location: [facility] OR;  Service: Orthopedics   • TOTAL ABDOMINAL HYSTERECTOMY  1970s   • TOTAL KNEE ARTHROPLASTY Right 4/29/2019    Procedure: RT TOTAL KNEE ARTHROPLASTY WITH NAVIGATION;  Surgeon: Bolivar Glover MD;  Location: [facility] OR;  Service: Orthopedics   • TOTAL KNEE ARTHROPLASTY  "Left 9/11/2019    Procedure: TOTAL KNEE ARTHROPLASTY;  Surgeon: Bolivar Glover MD;  Location: [facility] OR;  Service: Orthopedics       Family History   Problem Relation Age of Onset   • Dementia Mother    • Thyroid disease Mother    • Leukemia Father    • Thyroid disease Brother    • Malig Hyperthermia Neg Hx        Social History     Socioeconomic History   • Marital status:      Spouse name: Not on file   • Number of children: Not on file   • Years of education: Not on file   • Highest education level: Not on file   Tobacco Use   • Smoking status: Current Some Day Smoker     Packs/day: 0.25     Years: 50.00     Pack years: 12.50     Types: Cigarettes   • Smokeless tobacco: Never Used   • Tobacco comment: 5-6 CIG DAY   Substance and Sexual Activity   • Alcohol use: Yes     Comment: 1-2 per evening   • Drug use: No   • Sexual activity: Defer       Current Outpatient Medications on File Prior to Visit   Medication Sig Dispense Refill   • diphenhydrAMINE (BENADRYL) 25 mg capsule Take 25 mg by mouth 2 (Two) Times a Day As Needed.     • diphenoxylate-atropine (LOMOTIL) 2.5-0.025 MG per tablet Take 1 tablet by mouth 4 (Four) Times a Day As Needed.     • omeprazole (priLOSEC) 20 MG capsule TAKE 1 CAPSULE BY MOUTH DAILY 90 capsule 0     No current facility-administered medications on file prior to visit.       No Known Allergies    Immunization History   Administered Date(s) Administered   • COVID-19 (MODERNA) 02/08/2021, 02/14/2021   • Fluad Quad 65+ 10/31/2019   • Fluzone High Dose =>65 Years ([employer] ONLY) 09/28/2016, 10/11/2018   • Pneumococcal Conjugate 13-Valent (PCV13) 06/29/2017   • Pneumococcal Polysaccharide (PPSV23) 11/05/2019   • TD Preservative Free 11/05/2019       Objective     /78   Pulse 85   Ht 151.8 cm (59.75\")   Wt 63.5 kg (140 lb)   BMI 27.57 kg/m²     Physical Exam  Vitals signs and nursing note reviewed.   Constitutional:       Appearance: Normal appearance.   HENT:      Head: " Normocephalic and atraumatic.      Right Ear: Tympanic membrane normal.      Left Ear: Tympanic membrane normal.      Nose: Nose normal.      Mouth/Throat:      Mouth: Mucous membranes are moist.   Eyes:      Extraocular Movements: Extraocular movements intact.      Pupils: Pupils are equal, round, and reactive to light.   Neck:      Musculoskeletal: Normal range of motion.   Cardiovascular:      Rate and Rhythm: Normal rate and regular rhythm.      Pulses: Normal pulses.      Heart sounds: Normal heart sounds.   Pulmonary:      Effort: Pulmonary effort is normal.      Breath sounds: Normal breath sounds.   Abdominal:      General: Abdomen is flat.      Palpations: Abdomen is soft.   Genitourinary:     Comments: Vulvar erythema    Musculoskeletal: Normal range of motion.   Skin:     General: Skin is warm and dry.   Neurological:      General: No focal deficit present.      Mental Status: She is alert and oriented to person, place, and time.   Psychiatric:         Mood and Affect: Mood normal.         Behavior: Behavior normal.         Thought Content: Thought content normal.         Judgment: Judgment normal.         Assessment/Plan   Diagnoses and all orders for this visit:    1. Healthcare maintenance (Primary)    2. Encounter for screening mammogram for malignant neoplasm of breast  -     Mammo screening digital tomosynthesis bilateral w CAD; Future    3. Gastroesophageal reflux disease, unspecified whether esophagitis present    4. Primary osteoarthritis of knees, bilateral    5. Tobacco dependence    Other orders  -     desonide (DESOWEN) 0.05 % ointment; Apply  topically to the appropriate area as directed 2 (Two) Times a Day.  Dispense: 30 g; Refill: 1        Discussion    AWV.      Medicare Risks and Personalized Health Plan  CMS Preventative Services Quick Reference  Advance Directive Discussion  Breast Cancer/Mammogram Screening  Cardiovascular risk  Fall Risk  Glaucoma Risk  Immunizations  Discussed/Encouraged (specific immunizations; Hepatitis A Vaccine/Series and Shingrix )  Inadequate Social Support, Isolation, Loneliness, Lack of Transportation, Financial Difficulties, or Caregiver Stress   Inactivity/Sedentary  Lung Cancer Risk    ACP discussion was held with the patient during this visit. Patient has an advance directive (not in EMR), copy requested..    Direct observation of cognitive abilities:  normal  Patient w/ tobacco dependence. She is offered assistance with this. She declines and does not feel ready to d/c. She declines ct lung screening. She will continue to have routine hcm.   Patient encouraged to increase activity and weight bearing exercises. She will get an annual skin screen and would like to continue mammography. DEXA this fall. Labs reviewed with patient.       Depression Screen:    PHQ-2/PHQ-9 Depression Screening 3/1/2021   Little interest or pleasure in doing things 0   Feeling down, depressed, or hopeless 0   Total Score 0       Fall Risk Screen:  PIERCE Fall Risk Assessment was completed, and patient is at LOW risk for falls.Assessment completed on:3/1/2021    Health Habits and Functional/Cognitive screen:  Functional & Cognitive Status 3/1/2021   Do you have difficulty preparing food and eating? No   Do you have difficulty bathing yourself, getting dressed or grooming yourself? No   Do you have difficulty using the toilet? No   Do you have difficulty moving around from place to place? No   Do you have trouble with steps or getting out of a bed or a chair? No   Current Diet Well Balanced Diet   Dental Exam Up to date   Eye Exam Up to date   Do you need help using the phone?  No   Are you deaf or do you have serious difficulty hearing?  No   Do you need help with transportation? No   Do you need help shopping? No   Do you need help preparing meals?  No   Do you need help with housework?  No   Do you need help with laundry? No   Do you need help taking your medications? No    Do you need help managing money? No   Do you ever drive or ride in a car without wearing a seat belt? No   Have you felt unusual stress, anger or loneliness in the last month? No   Who do you live with? Spouse   If you need help, do you have trouble finding someone available to you? No   Have you been bothered in the last four weeks by sexual problems? No   Do you have difficulty concentrating, remembering or making decisions? No       Health Maintenance   Topic Date Due   • ZOSTER VACCINE (1 of 2) Never done   • MAMMOGRAM  01/01/2021   • DXA SCAN  11/06/2021   • ANNUAL WELLNESS VISIT  03/01/2022   • TDAP/TD VACCINES (2 - Tdap) 11/05/2029   • COVID-19 Vaccine  Completed   • INFLUENZA VACCINE  Completed   • Pneumococcal Vaccine 65+  Completed   • MENINGOCOCCAL VACCINE  Aged Out            Future Appointments   Date Time Provider Department Center   3/30/2021 10:30 AM [facility]   2/25/2022  9:20 AM [facility]   3/4/2022  9:15 AM Maia Whitney MD [facility]

## 2021-03-12 ENCOUNTER — TELEPHONE (OUTPATIENT)
Dept: INTERNAL MEDICINE | Facility: CLINIC | Age: 81
End: 2021-03-12

## 2021-03-12 NOTE — TELEPHONE ENCOUNTER
Pt informed and understands      ----- Message from Maia Whitney MD sent at 3/12/2021  4:14 PM EST -----  Regarding: FW: Non-Urgent Medical Question  Contact: [phone]  Apply to vaginal area bid for one week then qd for 3 weeks then 3 days weekly until next visit.   ----- Message -----  From: Caryl Sorenson MA  Sent: 3/12/2021   3:52 PM EST  To: Maia Whitney MD  Subject: FW: Non-Urgent Medical Question                    ----- Message -----  From: Trice Melara  Sent: 3/12/2021   3:47 PM EST  To: [facility]  Subject: Non-Urgent Medical Question                      I forgot the directions you gave me using Desonide.  I am sorry, please give me the directions and where I should be using it.  Thank you.. Trice Melara

## 2021-03-30 ENCOUNTER — HOSPITAL ENCOUNTER (OUTPATIENT)
Dept: MAMMOGRAPHY | Facility: HOSPITAL | Age: 81
Discharge: HOME | End: 2021-03-30
Admitting: INTERNAL MEDICINE

## 2021-03-30 DIAGNOSIS — Z12.31 ENCOUNTER FOR SCREENING MAMMOGRAM FOR MALIGNANT NEOPLASM OF BREAST: ICD-10-CM

## 2021-03-30 PROCEDURE — 77063 BREAST TOMOSYNTHESIS BI: CPT

## 2021-03-30 PROCEDURE — 77067 SCR MAMMO BI INCL CAD: CPT

## 2021-04-26 RX ORDER — OMEPRAZOLE 20 MG/1
20 CAPSULE, DELAYED RELEASE ORAL DAILY
Qty: 90 CAPSULE | Refills: 0 | Status: SHIPPED | OUTPATIENT
Start: 2021-04-26 | End: 2021-07-29 | Stop reason: SDUPTHER

## 2021-07-29 RX ORDER — OMEPRAZOLE 20 MG/1
20 CAPSULE, DELAYED RELEASE ORAL DAILY
Qty: 90 CAPSULE | Refills: 0 | Status: SHIPPED | OUTPATIENT
Start: 2021-07-29 | End: 2021-11-01

## 2021-11-01 RX ORDER — OMEPRAZOLE 20 MG/1
20 CAPSULE, DELAYED RELEASE ORAL DAILY
Qty: 90 CAPSULE | Refills: 0 | Status: SHIPPED | OUTPATIENT
Start: 2021-11-01 | End: 2021-11-04

## 2021-11-04 RX ORDER — OMEPRAZOLE 20 MG/1
20 CAPSULE, DELAYED RELEASE ORAL DAILY
Qty: 90 CAPSULE | Refills: 0 | Status: SHIPPED | OUTPATIENT
Start: 2021-11-04 | End: 2022-04-28 | Stop reason: SDUPTHER

## 2022-02-16 DIAGNOSIS — K21.9 GASTROESOPHAGEAL REFLUX DISEASE, UNSPECIFIED WHETHER ESOPHAGITIS PRESENT: ICD-10-CM

## 2022-02-16 DIAGNOSIS — N28.9 ABNORMAL KIDNEY FUNCTION: Primary | ICD-10-CM

## 2022-02-16 DIAGNOSIS — I10 HYPERTENSION, UNSPECIFIED TYPE: ICD-10-CM

## 2022-02-16 DIAGNOSIS — S72.141A CLOSED DISPLACED INTERTROCHANTERIC FRACTURE OF RIGHT FEMUR, INITIAL ENCOUNTER: ICD-10-CM

## 2022-02-16 DIAGNOSIS — M17.0 PRIMARY OSTEOARTHRITIS OF KNEES, BILATERAL: ICD-10-CM

## 2022-02-16 PROBLEM — E78.1 HYPERTRIGLYCERIDEMIA: Status: ACTIVE | Noted: 2022-02-16

## 2022-02-26 LAB
ALBUMIN SERPL-MCNC: 4.3 G/DL (ref 3.6–4.6)
ALBUMIN/GLOB SERPL: 1.7 {RATIO} (ref 1.2–2.2)
ALP SERPL-CCNC: 68 IU/L (ref 44–121)
ALT SERPL-CCNC: 13 IU/L (ref 0–32)
APPEARANCE UR: CLEAR
AST SERPL-CCNC: 23 IU/L (ref 0–40)
BACTERIA #/AREA URNS HPF: ABNORMAL /[HPF]
BASOPHILS # BLD AUTO: 0.1 X10E3/UL (ref 0–0.2)
BASOPHILS NFR BLD AUTO: 2 %
BILIRUB SERPL-MCNC: 0.7 MG/DL (ref 0–1.2)
BILIRUB UR QL STRIP: NEGATIVE
BUN SERPL-MCNC: 11 MG/DL (ref 8–27)
BUN/CREAT SERPL: 16 (ref 12–28)
CALCIUM SERPL-MCNC: 9.5 MG/DL (ref 8.7–10.3)
CASTS URNS QL MICRO: ABNORMAL /LPF
CHLORIDE SERPL-SCNC: 98 MMOL/L (ref 96–106)
CO2 SERPL-SCNC: 30 MMOL/L (ref 20–29)
COLOR UR: YELLOW
CREAT SERPL-MCNC: 0.69 MG/DL (ref 0.57–1)
EOSINOPHIL # BLD AUTO: 0.4 X10E3/UL (ref 0–0.4)
EOSINOPHIL NFR BLD AUTO: 6 %
EPI CELLS #/AREA URNS HPF: ABNORMAL /HPF (ref 0–10)
ERYTHROCYTE [DISTWIDTH] IN BLOOD BY AUTOMATED COUNT: 13.4 % (ref 11.7–15.4)
GLOBULIN SER CALC-MCNC: 2.5 G/DL (ref 1.5–4.5)
GLUCOSE SERPL-MCNC: 87 MG/DL (ref 65–99)
GLUCOSE UR QL STRIP: NEGATIVE
HCT VFR BLD AUTO: 44.3 % (ref 34–46.6)
HGB BLD-MCNC: 14.3 G/DL (ref 11.1–15.9)
HGB UR QL STRIP: NEGATIVE
IMM GRANULOCYTES # BLD AUTO: 0 X10E3/UL (ref 0–0.1)
IMM GRANULOCYTES NFR BLD AUTO: 1 %
KETONES UR QL STRIP: NEGATIVE
LEUKOCYTE ESTERASE UR QL STRIP: ABNORMAL
LYMPHOCYTES # BLD AUTO: 1.7 X10E3/UL (ref 0.7–3.1)
LYMPHOCYTES NFR BLD AUTO: 30 %
MCH RBC QN AUTO: 26.9 PG (ref 26.6–33)
MCHC RBC AUTO-ENTMCNC: 32.3 G/DL (ref 31.5–35.7)
MCV RBC AUTO: 83 FL (ref 79–97)
MICRO URNS: ABNORMAL
MONOCYTES # BLD AUTO: 0.5 X10E3/UL (ref 0.1–0.9)
MONOCYTES NFR BLD AUTO: 9 %
NEUTROPHILS # BLD AUTO: 3 X10E3/UL (ref 1.4–7)
NEUTROPHILS NFR BLD AUTO: 52 %
NITRITE UR QL STRIP: NEGATIVE
PH UR STRIP: 7 [PH] (ref 5–7.5)
PLATELET # BLD AUTO: 199 X10E3/UL (ref 150–450)
POTASSIUM SERPL-SCNC: 3.3 MMOL/L (ref 3.5–5.2)
PROT SERPL-MCNC: 6.8 G/DL (ref 6–8.5)
PROT UR QL STRIP: NEGATIVE
RBC # BLD AUTO: 5.32 X10E6/UL (ref 3.77–5.28)
RBC #/AREA URNS HPF: ABNORMAL /HPF (ref 0–2)
SODIUM SERPL-SCNC: 143 MMOL/L (ref 134–144)
SP GR UR STRIP: 1.01 (ref 1–1.03)
TSH SERPL DL<=0.005 MIU/L-ACNC: 2.41 UIU/ML (ref 0.45–4.5)
UROBILINOGEN UR STRIP-MCNC: 0.2 MG/DL (ref 0.2–1)
WBC # BLD AUTO: 5.6 X10E3/UL (ref 3.4–10.8)
WBC #/AREA URNS HPF: ABNORMAL /HPF (ref 0–5)

## 2022-02-28 ENCOUNTER — TELEPHONE (OUTPATIENT)
Dept: INTERNAL MEDICINE | Facility: CLINIC | Age: 82
End: 2022-02-28

## 2022-02-28 NOTE — TELEPHONE ENCOUNTER
Pt informed  ----- Message from Maia Whitney MD sent at 2/28/2022  2:37 PM EST -----  Potassium is low. Has she had diarrhea recently>? Encourage her to increase dietary potassium (melon, toatoe, potatoe, oranges/ orange juice, banana, electrolyte drink, etc). Will repeat at her scheduled visit.   santi

## 2022-03-04 ENCOUNTER — OFFICE VISIT (OUTPATIENT)
Dept: INTERNAL MEDICINE | Facility: CLINIC | Age: 82
End: 2022-03-04

## 2022-03-04 VITALS
HEIGHT: 61 IN | SYSTOLIC BLOOD PRESSURE: 140 MMHG | BODY MASS INDEX: 25.49 KG/M2 | WEIGHT: 135 LBS | DIASTOLIC BLOOD PRESSURE: 90 MMHG | HEART RATE: 88 BPM

## 2022-03-04 DIAGNOSIS — F17.200 TOBACCO DEPENDENCE: ICD-10-CM

## 2022-03-04 DIAGNOSIS — I10 HYPERTENSION, UNSPECIFIED TYPE: ICD-10-CM

## 2022-03-04 DIAGNOSIS — E87.6 HYPOKALEMIA: ICD-10-CM

## 2022-03-04 DIAGNOSIS — Z00.00 HEALTHCARE MAINTENANCE: Primary | ICD-10-CM

## 2022-03-04 DIAGNOSIS — Z12.31 ENCOUNTER FOR SCREENING MAMMOGRAM FOR BREAST CANCER: ICD-10-CM

## 2022-03-04 DIAGNOSIS — Z12.2 ENCOUNTER FOR SCREENING FOR LUNG CANCER: ICD-10-CM

## 2022-03-04 DIAGNOSIS — F17.210 SMOKING GREATER THAN 40 PACK YEARS: ICD-10-CM

## 2022-03-04 PROCEDURE — G0439 PPPS, SUBSEQ VISIT: HCPCS | Performed by: INTERNAL MEDICINE

## 2022-03-04 PROCEDURE — 1159F MED LIST DOCD IN RCRD: CPT | Performed by: INTERNAL MEDICINE

## 2022-03-04 PROCEDURE — 99214 OFFICE O/P EST MOD 30 MIN: CPT | Performed by: INTERNAL MEDICINE

## 2022-03-04 PROCEDURE — 1170F FXNL STATUS ASSESSED: CPT | Performed by: INTERNAL MEDICINE

## 2022-03-04 PROCEDURE — 93000 ELECTROCARDIOGRAM COMPLETE: CPT | Performed by: INTERNAL MEDICINE

## 2022-03-04 RX ORDER — LOSARTAN POTASSIUM 25 MG/1
25 TABLET ORAL DAILY
Qty: 30 TABLET | Refills: 5 | Status: SHIPPED | OUTPATIENT
Start: 2022-03-04 | End: 2022-08-23

## 2022-03-04 RX ORDER — MONTELUKAST SODIUM 4 MG/1
1 TABLET, CHEWABLE ORAL 2 TIMES DAILY
Qty: 60 TABLET | Refills: 3 | Status: SHIPPED | OUTPATIENT
Start: 2022-03-04 | End: 2022-03-14 | Stop reason: SDUPTHER

## 2022-03-04 NOTE — PROGRESS NOTES
The ABCs of the Annual Wellness Visit  Subsequent Medicare Wellness Visit    Chief Complaint   Patient presents with   • Annual Exam   • Hypertension   • tob dependence   • loose stooling      Subjective    History of Present Illness:  Trice Melara is a 81 y.o. female who presents for a Subsequent Medicare Wellness Visit, to review chronic issues, and to discuss acute needs. She is doing well today. She reports good self care. She is getting healthy nutrition. Routine physical activity. Sleeping well and hydrating well. She is smoking about 3-4 cigarettes daily and is not interested in d/c. Some loose stooling. Takes immodium daily for this. Associated with hemicolectomy 2010. bp elevated today and on previous visits. She does not take anything for this.   Loose stooling. Takes immodium prn for this.       Patient is due for hep A and Shingrix vac  She is current on eye exam, dental, and skin examinations.       The following portions of the patient's history were reviewed and   updated as appropriate: allergies, current medications, past family history, past medical history, past social history, past surgical history and problem list.    Compared to one year ago, the patient feels her physical   health is the same.    Compared to one year ago, the patient feels her mental   health is the same.    Recent Hospitalizations:  She was not admitted within the past 365 days       Current Medical Providers:  Patient Care Team:  Maia Whitney MD as PCP - General (Internal Medicine)  Rey Madrid MD as Consulting Physician (Hematology and Oncology)  Bolivar Glover MD as Surgeon (Orthopedic Surgery)    Outpatient Medications Prior to Visit   Medication Sig Dispense Refill   • desonide (DESOWEN) 0.05 % ointment Apply  topically to the appropriate area as directed 2 (Two) Times a Day. 30 g 1   • diphenhydrAMINE (BENADRYL) 25 mg capsule Take 25 mg by mouth 2 (Two) Times a Day As Needed.     • diphenoxylate-atropine  "(LOMOTIL) 2.5-0.025 MG per tablet Take 1 tablet by mouth 4 (Four) Times a Day As Needed.     • omeprazole (priLOSEC) 20 MG capsule TAKE 1 CAPSULE BY MOUTH DAILY 90 capsule 0     No facility-administered medications prior to visit.       No opioid medication identified on active medication list. I have reviewed chart for other potential  high risk medication/s and harmful drug interactions in the elderly.          Aspirin is not on active medication list.  Aspirin use is not indicated based on review of current medical condition/s. Risk of harm outweighs potential benefits.  .    Patient Active Problem List   Diagnosis   • Gastroesophageal reflux disease   • Abnormal kidney function   • Primary osteoarthritis of knees, bilateral   • Closed displaced intertrochanteric fracture of right femur (HCC)   • Osteoporosis   • Status post right knee replacement   • Hypertriglyceridemia     Advance Care Planning  Advance Directive is not on file.  ACP discussion was held with the patient during this visit. Patient has an advance directive (not in EMR), copy requested.          Objective    Vitals:    03/04/22 0919   BP: 140/90   Pulse: 88   Weight: 61.2 kg (135 lb)   Height: 154.9 cm (61\")     BMI Readings from Last 1 Encounters:   03/04/22 25.51 kg/m²   BMI is above normal parameters. Recommendations include: exercise counseling and nutrition counseling    Does the patient have evidence of cognitive impairment? No    Physical Exam  Vitals and nursing note reviewed.   Constitutional:       Appearance: Normal appearance. She is well-developed.   HENT:      Head: Normocephalic and atraumatic.      Right Ear: Tympanic membrane and external ear normal.      Left Ear: Tympanic membrane and external ear normal.      Nose: Nose normal.      Mouth/Throat:      Mouth: Mucous membranes are moist.   Eyes:      Extraocular Movements: Extraocular movements intact.      Pupils: Pupils are equal, round, and reactive to light. "   Cardiovascular:      Rate and Rhythm: Normal rate and regular rhythm.      Pulses: Normal pulses.      Heart sounds: Normal heart sounds.   Pulmonary:      Effort: Pulmonary effort is normal. No respiratory distress.      Breath sounds: Normal breath sounds.   Abdominal:      General: Abdomen is flat.      Palpations: Abdomen is soft.   Genitourinary:     Comments: Vaginal atrophic changes    Musculoskeletal:         General: Normal range of motion.      Cervical back: Normal range of motion and neck supple.   Skin:     General: Skin is warm and dry.   Neurological:      General: No focal deficit present.      Mental Status: She is alert and oriented to person, place, and time.   Psychiatric:         Mood and Affect: Mood normal.         Behavior: Behavior normal.         Thought Content: Thought content normal.         Judgment: Judgment normal.                 HEALTH RISK ASSESSMENT    Smoking Status:  Social History     Tobacco Use   Smoking Status Current Some Day Smoker   • Packs/day: 0.25   • Years: 50.00   • Pack years: 12.50   • Types: Cigarettes   Smokeless Tobacco Never Used   Tobacco Comment    5-6 CIG DAY     Alcohol Consumption:  Social History     Substance and Sexual Activity   Alcohol Use Yes    Comment: 1-2 per evening     Fall Risk Screen:    STEADI Fall Risk Assessment was completed, and patient is at LOW risk for falls.Assessment completed on:3/4/2022    Depression Screening:  PHQ-2/PHQ-9 Depression Screening 3/4/2022   Little interest or pleasure in doing things 0   Feeling down, depressed, or hopeless 0   Total Score 0       Health Habits and Functional and Cognitive Screening:  Functional & Cognitive Status 3/4/2022   Do you have difficulty preparing food and eating? No   Do you have difficulty bathing yourself, getting dressed or grooming yourself? No   Do you have difficulty using the toilet? No   Do you have difficulty moving around from place to place? No   Do you have trouble with steps  or getting out of a bed or a chair? No   Current Diet Well Balanced Diet   Dental Exam Up to date   Eye Exam Up to date   Do you need help using the phone?  No   Are you deaf or do you have serious difficulty hearing?  No   Do you need help with transportation? No   Do you need help shopping? No   Do you need help preparing meals?  No   Do you need help with housework?  No   Do you need help with laundry? No   Do you need help taking your medications? No   Do you need help managing money? No   Do you ever drive or ride in a car without wearing a seat belt? No   Have you felt unusual stress, anger or loneliness in the last month? No   Who do you live with? Spouse   If you need help, do you have trouble finding someone available to you? No   Have you been bothered in the last four weeks by sexual problems? No   Do you have difficulty concentrating, remembering or making decisions? No       Age-appropriate Screening Schedule:  Refer to the list below for future screening recommendations based on patient's age, sex and/or medical conditions. Orders for these recommended tests are listed in the plan section. The patient has been provided with a written plan.    Health Maintenance   Topic Date Due   • ZOSTER VACCINE (1 of 2) Never done   • DXA SCAN  11/06/2021   • LIPID PANEL  02/28/2022   • MAMMOGRAM  03/30/2022   • TDAP/TD VACCINES (2 - Tdap) 11/05/2029   • INFLUENZA VACCINE  Completed              Assessment/Plan   CMS Preventative Services Quick Reference  Risk Factors Identified During Encounter  Cardiovascular Disease  Immunizations Discussed/Encouraged (specific Immunizations; Hepatitis A Vaccine/Series and Shingrix  The above risks/problems have been discussed with the patient.  Follow up actions/plans if indicated are seen below in the Assessment/Plan Section.  Pertinent information has been shared with the patient in the After Visit Summary.    Diagnoses and all orders for this visit:    1. Healthcare  maintenance (Primary)    2. Hypertension, unspecified type  -     ECG 12 Lead    3. Encounter for screening mammogram for breast cancer  -     Mammo screening digital tomosynthesis bilateral w CAD    4. Tobacco dependence  -     ECG 12 Lead    5. Encounter for screening for lung cancer    6. Smoking greater than 40 pack years    7. Hypokalemia  -     Basic Metabolic Panel    Other orders  -     colestipol (COLESTID) 1 g tablet; Take 1 tablet by mouth 2 (Two) Times a Day.  Dispense: 60 tablet; Refill: 3  -     losartan (Cozaar) 25 MG tablet; Take 1 tablet by mouth Daily.  Dispense: 30 tablet; Refill: 5      -hypokalemia- repeat potassium testing  - dumping- may try colestipol 1-2 times daily v prn immodium. Declines referral to gi.   - hypertension- will start losartan 25 mg daily. To monitor bp at home and call w/ readings  - tob dep- advised d/c. Patient declines assistance with this.   - hcm- to get vascular screening, dermatological evaluation, eye exam, mammogram, hep A and shingrix vac.   F/u annually        Follow Up:   Return in about 1 year (around 3/4/2023) for Medicare Wellness.     An After Visit Summary and PPPS were made available to the patient.        I spent 55 minutes caring for Trice on this date of service. This time includes time spent by me in the following activities:preparing for the visit, reviewing tests, obtaining and/or reviewing a separately obtained history, performing a medically appropriate examination and/or evaluation , counseling and educating the patient/family/caregiver, ordering medications, tests, or procedures, referring and communicating with other health care professionals , documenting information in the medical record and independently interpreting results and communicating that information with the patient/family/caregiver

## 2022-03-05 LAB
BUN SERPL-MCNC: 9 MG/DL (ref 8–27)
BUN/CREAT SERPL: 14 (ref 12–28)
CALCIUM SERPL-MCNC: 9.5 MG/DL (ref 8.7–10.3)
CHLORIDE SERPL-SCNC: 98 MMOL/L (ref 96–106)
CO2 SERPL-SCNC: 29 MMOL/L (ref 20–29)
CREAT SERPL-MCNC: 0.66 MG/DL (ref 0.57–1)
EGFR GENE MUT ANL BLD/T: 87 ML/MIN/1.73
GLUCOSE SERPL-MCNC: 85 MG/DL (ref 65–99)
POTASSIUM SERPL-SCNC: 3.5 MMOL/L (ref 3.5–5.2)
SODIUM SERPL-SCNC: 144 MMOL/L (ref 134–144)

## 2022-03-07 ENCOUNTER — TELEPHONE (OUTPATIENT)
Dept: INTERNAL MEDICINE | Facility: CLINIC | Age: 82
End: 2022-03-07

## 2022-03-07 NOTE — TELEPHONE ENCOUNTER
----- Message from Maia Whitney MD sent at 3/6/2022  9:47 PM EST -----  Please advise patient her potassium/ labs are now normal.   santi

## 2022-03-14 RX ORDER — MONTELUKAST SODIUM 4 MG/1
1 TABLET, CHEWABLE ORAL 2 TIMES DAILY
Qty: 60 TABLET | Refills: 3 | Status: SHIPPED | OUTPATIENT
Start: 2022-03-14 | End: 2022-08-16

## 2022-03-17 ENCOUNTER — TELEPHONE (OUTPATIENT)
Dept: INTERNAL MEDICINE | Facility: CLINIC | Age: 82
End: 2022-03-17

## 2022-03-18 NOTE — TELEPHONE ENCOUNTER
PATIENT CALLED AND STATES WHEN SHE PICKED UP THE MEDICATION  colestipol (COLESTID) 1 g tablet  TH PHARMACY ONLY GAVE HER 5 TABLETS AND SAID THEY ARE MAKING THIS MEDICATION ANY LONGER.     PLEASE ADVISE   CALL BACK NUMBER [phone]  
Pt informed to call insurance to find replacement    
- - -

## 2022-04-28 RX ORDER — OMEPRAZOLE 20 MG/1
20 CAPSULE, DELAYED RELEASE ORAL DAILY
Qty: 90 CAPSULE | Refills: 0 | Status: SHIPPED | OUTPATIENT
Start: 2022-04-28 | End: 2022-05-06

## 2022-04-28 NOTE — TELEPHONE ENCOUNTER
Caller: Trice Melara    Relationship: Self    Best call back number: [phone]    Requested Prescriptions:   Requested Prescriptions     Pending Prescriptions Disp Refills   • omeprazole (priLOSEC) 20 MG capsule 90 capsule 0     Sig: Take 1 capsule by mouth Daily.        Pharmacy where request should be sent: [id number] - [place], [place] - [facility] - [phone]  - [phone] FX       Does the patient have less than a 3 day supply:  [] Yes  [x] No    Deepak Pike   04/28/22 14:47 EDT

## 2022-05-06 RX ORDER — OMEPRAZOLE 20 MG/1
20 CAPSULE, DELAYED RELEASE ORAL DAILY
Qty: 90 CAPSULE | Refills: 0 | Status: SHIPPED | OUTPATIENT
Start: 2022-05-06 | End: 2022-08-03

## 2022-06-02 ENCOUNTER — HOSPITAL ENCOUNTER (OUTPATIENT)
Dept: MAMMOGRAPHY | Facility: HOSPITAL | Age: 82
Discharge: HOME | End: 2022-06-02
Admitting: INTERNAL MEDICINE

## 2022-06-02 PROCEDURE — 77063 BREAST TOMOSYNTHESIS BI: CPT

## 2022-06-02 PROCEDURE — 77067 SCR MAMMO BI INCL CAD: CPT

## 2022-08-03 RX ORDER — OMEPRAZOLE 20 MG/1
20 CAPSULE, DELAYED RELEASE ORAL DAILY
Qty: 90 CAPSULE | Refills: 0 | Status: SHIPPED | OUTPATIENT
Start: 2022-08-03 | End: 2022-08-04

## 2022-08-04 RX ORDER — OMEPRAZOLE 20 MG/1
20 CAPSULE, DELAYED RELEASE ORAL DAILY
Qty: 90 CAPSULE | Refills: 0 | Status: SHIPPED | OUTPATIENT
Start: 2022-08-04 | End: 2022-11-03

## 2022-08-16 RX ORDER — MONTELUKAST SODIUM 4 MG/1
TABLET, CHEWABLE ORAL
Qty: 60 TABLET | Refills: 3 | Status: SHIPPED | OUTPATIENT
Start: 2022-08-16 | End: 2022-12-23

## 2022-08-23 RX ORDER — LOSARTAN POTASSIUM 25 MG/1
25 TABLET ORAL DAILY
Qty: 30 TABLET | Refills: 5 | Status: SHIPPED | OUTPATIENT
Start: 2022-08-23 | End: 2022-12-02

## 2022-11-03 RX ORDER — OMEPRAZOLE 20 MG/1
20 CAPSULE, DELAYED RELEASE ORAL DAILY
Qty: 90 CAPSULE | Refills: 0 | Status: SHIPPED | OUTPATIENT
Start: 2022-11-03 | End: 2023-01-30

## 2022-12-02 RX ORDER — LOSARTAN POTASSIUM 25 MG/1
25 TABLET ORAL DAILY
Qty: 30 TABLET | Refills: 5 | Status: SHIPPED | OUTPATIENT
Start: 2022-12-02

## 2022-12-23 RX ORDER — MONTELUKAST SODIUM 4 MG/1
TABLET, CHEWABLE ORAL
Qty: 60 TABLET | Refills: 3 | Status: SHIPPED | OUTPATIENT
Start: 2022-12-23

## 2023-01-30 RX ORDER — OMEPRAZOLE 20 MG/1
20 CAPSULE, DELAYED RELEASE ORAL DAILY
Qty: 90 CAPSULE | Refills: 0 | Status: SHIPPED | OUTPATIENT
Start: 2023-01-30 | End: 2023-03-13

## 2023-02-22 DIAGNOSIS — M81.0 AGE-RELATED OSTEOPOROSIS WITHOUT CURRENT PATHOLOGICAL FRACTURE: ICD-10-CM

## 2023-02-22 DIAGNOSIS — E78.1 HYPERTRIGLYCERIDEMIA: Primary | ICD-10-CM

## 2023-02-22 DIAGNOSIS — N28.9 ABNORMAL KIDNEY FUNCTION: ICD-10-CM

## 2023-03-05 LAB
ALBUMIN SERPL-MCNC: 4.6 G/DL (ref 3.5–5.2)
ALBUMIN/GLOB SERPL: 2.2 G/DL
ALP SERPL-CCNC: 62 U/L (ref 39–117)
ALT SERPL-CCNC: 12 U/L (ref 1–33)
APPEARANCE UR: ABNORMAL
AST SERPL-CCNC: 17 U/L (ref 1–32)
BACTERIA #/AREA URNS HPF: ABNORMAL /HPF
BACTERIA UR CULT: ABNORMAL
BACTERIA UR CULT: ABNORMAL
BASOPHILS # BLD AUTO: 0.09 10*3/MM3 (ref 0–0.2)
BASOPHILS NFR BLD AUTO: 1.5 % (ref 0–1.5)
BILIRUB SERPL-MCNC: 0.6 MG/DL (ref 0–1.2)
BILIRUB UR QL STRIP: NEGATIVE
BUN SERPL-MCNC: 9 MG/DL (ref 8–23)
BUN/CREAT SERPL: 12 (ref 7–25)
CALCIUM SERPL-MCNC: 9.1 MG/DL (ref 8.6–10.5)
CASTS URNS QL MICRO: ABNORMAL /LPF
CHLORIDE SERPL-SCNC: 99 MMOL/L (ref 98–107)
CHOLEST SERPL-MCNC: 171 MG/DL (ref 0–200)
CO2 SERPL-SCNC: 34.2 MMOL/L (ref 22–29)
COLOR UR: YELLOW
CREAT SERPL-MCNC: 0.75 MG/DL (ref 0.57–1)
EGFRCR SERPLBLD CKD-EPI 2021: 78.6 ML/MIN/1.73
EOSINOPHIL # BLD AUTO: 0.23 10*3/MM3 (ref 0–0.4)
EOSINOPHIL NFR BLD AUTO: 3.8 % (ref 0.3–6.2)
EPI CELLS #/AREA URNS HPF: ABNORMAL /HPF (ref 0–10)
ERYTHROCYTE [DISTWIDTH] IN BLOOD BY AUTOMATED COUNT: 12.8 % (ref 12.3–15.4)
GLOBULIN SER CALC-MCNC: 2.1 GM/DL
GLUCOSE SERPL-MCNC: 98 MG/DL (ref 65–99)
GLUCOSE UR QL STRIP: NEGATIVE
HCT VFR BLD AUTO: 43.9 % (ref 34–46.6)
HDLC SERPL-MCNC: 62 MG/DL (ref 40–60)
HGB BLD-MCNC: 14.3 G/DL (ref 12–15.9)
HGB UR QL STRIP: NEGATIVE
IMM GRANULOCYTES # BLD AUTO: 0.04 10*3/MM3 (ref 0–0.05)
IMM GRANULOCYTES NFR BLD AUTO: 0.7 % (ref 0–0.5)
KETONES UR QL STRIP: NEGATIVE
LDLC SERPL CALC-MCNC: 89 MG/DL (ref 0–100)
LEUKOCYTE ESTERASE UR QL STRIP: ABNORMAL
LYMPHOCYTES # BLD AUTO: 1.88 10*3/MM3 (ref 0.7–3.1)
LYMPHOCYTES NFR BLD AUTO: 30.7 % (ref 19.6–45.3)
MCH RBC QN AUTO: 27.7 PG (ref 26.6–33)
MCHC RBC AUTO-ENTMCNC: 32.6 G/DL (ref 31.5–35.7)
MCV RBC AUTO: 85.1 FL (ref 79–97)
MICRO URNS: ABNORMAL
MONOCYTES # BLD AUTO: 0.53 10*3/MM3 (ref 0.1–0.9)
MONOCYTES NFR BLD AUTO: 8.6 % (ref 5–12)
NEUTROPHILS # BLD AUTO: 3.36 10*3/MM3 (ref 1.7–7)
NEUTROPHILS NFR BLD AUTO: 54.7 % (ref 42.7–76)
NITRITE UR QL STRIP: NEGATIVE
NRBC BLD AUTO-RTO: 0 /100 WBC (ref 0–0.2)
OTHER ANTIBIOTIC SUSC ISLT: ABNORMAL
PH UR STRIP: 7 [PH] (ref 5–7.5)
PLATELET # BLD AUTO: 218 10*3/MM3 (ref 140–450)
POTASSIUM SERPL-SCNC: 3.4 MMOL/L (ref 3.5–5.2)
PROT SERPL-MCNC: 6.7 G/DL (ref 6–8.5)
PROT UR QL STRIP: NEGATIVE
RBC # BLD AUTO: 5.16 10*6/MM3 (ref 3.77–5.28)
RBC #/AREA URNS HPF: ABNORMAL /HPF (ref 0–2)
SODIUM SERPL-SCNC: 144 MMOL/L (ref 136–145)
SP GR UR STRIP: 1.01 (ref 1–1.03)
TRIGL SERPL-MCNC: 113 MG/DL (ref 0–150)
TSH SERPL DL<=0.005 MIU/L-ACNC: 2.63 UIU/ML (ref 0.27–4.2)
URINALYSIS REFLEX: ABNORMAL
UROBILINOGEN UR STRIP-MCNC: 0.2 MG/DL (ref 0.2–1)
VLDLC SERPL CALC-MCNC: 20 MG/DL (ref 5–40)
WBC # BLD AUTO: 6.13 10*3/MM3 (ref 3.4–10.8)
WBC #/AREA URNS HPF: ABNORMAL /HPF (ref 0–5)

## 2023-03-06 ENCOUNTER — TELEPHONE (OUTPATIENT)
Dept: INTERNAL MEDICINE | Facility: CLINIC | Age: 83
End: 2023-03-06
Payer: MEDICARE

## 2023-03-06 NOTE — TELEPHONE ENCOUNTER
Pt informed    ----- Message from Maia Whitney MD sent at 3/5/2023  4:33 PM EST -----  Bacteria noted in the urine. Is patient having any symptoms of a uti?   jw

## 2023-03-13 ENCOUNTER — OFFICE VISIT (OUTPATIENT)
Dept: INTERNAL MEDICINE | Facility: CLINIC | Age: 83
End: 2023-03-13
Payer: MEDICARE

## 2023-03-13 VITALS
HEIGHT: 59 IN | BODY MASS INDEX: 24.8 KG/M2 | WEIGHT: 123 LBS | SYSTOLIC BLOOD PRESSURE: 128 MMHG | HEART RATE: 100 BPM | DIASTOLIC BLOOD PRESSURE: 76 MMHG | OXYGEN SATURATION: 96 %

## 2023-03-13 DIAGNOSIS — Z00.00 HEALTHCARE MAINTENANCE: Primary | ICD-10-CM

## 2023-03-13 DIAGNOSIS — L30.9 DERMATITIS: ICD-10-CM

## 2023-03-13 DIAGNOSIS — Z12.31 ENCOUNTER FOR SCREENING MAMMOGRAM FOR BREAST CANCER: ICD-10-CM

## 2023-03-13 DIAGNOSIS — I10 HYPERTENSION, UNSPECIFIED TYPE: ICD-10-CM

## 2023-03-13 DIAGNOSIS — M81.0 AGE-RELATED OSTEOPOROSIS WITHOUT CURRENT PATHOLOGICAL FRACTURE: ICD-10-CM

## 2023-03-13 DIAGNOSIS — Z78.0 MENOPAUSE: ICD-10-CM

## 2023-03-13 PROCEDURE — 1159F MED LIST DOCD IN RCRD: CPT | Performed by: INTERNAL MEDICINE

## 2023-03-13 PROCEDURE — G0439 PPPS, SUBSEQ VISIT: HCPCS | Performed by: INTERNAL MEDICINE

## 2023-03-13 PROCEDURE — 99213 OFFICE O/P EST LOW 20 MIN: CPT | Performed by: INTERNAL MEDICINE

## 2023-03-13 PROCEDURE — 1170F FXNL STATUS ASSESSED: CPT | Performed by: INTERNAL MEDICINE

## 2023-03-13 RX ORDER — LOPERAMIDE HYDROCHLORIDE 2 MG/1
CAPSULE ORAL
COMMUNITY
Start: 2023-02-13

## 2023-03-13 RX ORDER — CLOTRIMAZOLE AND BETAMETHASONE DIPROPIONATE 10; .64 MG/G; MG/G
1 CREAM TOPICAL 2 TIMES DAILY
Qty: 30 G | Refills: 2 | Status: SHIPPED | OUTPATIENT
Start: 2023-03-13

## 2023-03-13 NOTE — PROGRESS NOTES
The ABCs of the Annual Wellness Visit  Subsequent Medicare Wellness Visit    Subjective    Trice Melara is a 82 y.o. female who presents for a Subsequent Medicare Wellness Visit.    The following portions of the patient's history were reviewed and   updated as appropriate: allergies, current medications, past family history, past medical history, past social history, past surgical history and problem list.    Compared to one year ago, the patient feels her physical   health is the same.    Compared to one year ago, the patient feels her mental   health is the same.    Recent Hospitalizations:  She was not admitted to the hospital during the last year.       Current Medical Providers:  Patient Care Team:  Maia Whitney MD as PCP - General (Internal Medicine)  Rey Madrid MD as Consulting Physician (Hematology and Oncology)  Bolivar Glover MD as Surgeon (Orthopedic Surgery)    Outpatient Medications Prior to Visit   Medication Sig Dispense Refill   • colestipol (COLESTID) 1 g tablet TAKE 1 TABLET BY MOUTH TWICE DAILY 60 tablet 3   • diphenoxylate-atropine (LOMOTIL) 2.5-0.025 MG per tablet Take 1 tablet by mouth 4 (Four) Times a Day As Needed.     • loperamide (IMODIUM) 2 MG capsule TAKE 1 CAPSULE BY MOUTH EVERY 6 HOURS AS NEEDED FOR DIARRHEA     • losartan (COZAAR) 25 MG tablet TAKE 1 TABLET BY MOUTH DAILY 30 tablet 5   • desonide (DESOWEN) 0.05 % ointment Apply  topically to the appropriate area as directed 2 (Two) Times a Day. 30 g 1   • diphenhydrAMINE (BENADRYL) 25 mg capsule Take 25 mg by mouth 2 (Two) Times a Day As Needed.     • omeprazole (priLOSEC) 20 MG capsule TAKE 1 CAPSULE BY MOUTH DAILY 90 capsule 0     No facility-administered medications prior to visit.       No opioid medication identified on active medication list. I have reviewed chart for other potential  high risk medication/s and harmful drug interactions in the elderly.          Aspirin is not on active medication list.  Aspirin use is  "not indicated based on review of current medical condition/s. Risk of harm outweighs potential benefits.  .    Patient Active Problem List   Diagnosis   • Gastroesophageal reflux disease   • Abnormal kidney function   • Primary osteoarthritis of knees, bilateral   • Closed displaced intertrochanteric fracture of right femur (HCC)   • Osteoporosis   • Status post right knee replacement   • Hypertriglyceridemia     Advance Care Planning  Advance Directive is on file.  ACP discussion was held with the patient during this visit. Patient has an advance directive in EMR which is still valid.      Objective    Vitals:    03/13/23 1114   BP: 128/76   Pulse: 100   SpO2: 96%   Weight: 55.8 kg (123 lb)   Height: 149.9 cm (59\")     Estimated body mass index is 24.84 kg/m² as calculated from the following:    Height as of this encounter: 149.9 cm (59\").    Weight as of this encounter: 55.8 kg (123 lb).    BMI is within normal parameters. No other follow-up for BMI required.      Does the patient have evidence of cognitive impairment? No    Lab Results   Component Value Date    CHLPL 171 02/28/2023    TRIG 113 02/28/2023    HDL 62 (H) 02/28/2023    LDL 89 02/28/2023    VLDL 20 02/28/2023        HEALTH RISK ASSESSMENT    Smoking Status:  Social History     Tobacco Use   Smoking Status Some Days   • Packs/day: 0.25   • Years: 50.00   • Pack years: 12.50   • Types: Cigarettes   Smokeless Tobacco Never   Tobacco Comments    5-6 CIG DAY     Alcohol Consumption:  Social History     Substance and Sexual Activity   Alcohol Use Yes    Comment: 1-2 per evening     Fall Risk Screen:    STEADI Fall Risk Assessment was completed, and patient is at LOW risk for falls.Assessment completed on:3/13/2023    Depression Screening:  PHQ-2/PHQ-9 Depression Screening 3/13/2023   Little Interest or Pleasure in Doing Things 0-->not at all   Feeling Down, Depressed or Hopeless 0-->not at all   PHQ-9: Brief Depression Severity Measure Score 0       Health " Habits and Functional and Cognitive Screening:  Functional & Cognitive Status 3/13/2023   Do you have difficulty preparing food and eating? No   Do you have difficulty bathing yourself, getting dressed or grooming yourself? No   Do you have difficulty using the toilet? No   Do you have difficulty moving around from place to place? No   Do you have trouble with steps or getting out of a bed or a chair? No   Current Diet Well Balanced Diet   Dental Exam Up to date   Eye Exam -   Do you need help using the phone?  No   Are you deaf or do you have serious difficulty hearing?  No   Do you need help with transportation? No   Do you need help shopping? No   Do you need help preparing meals?  No   Do you need help with housework?  No   Do you need help with laundry? No   Do you need help taking your medications? No   Do you need help managing money? No   Do you ever drive or ride in a car without wearing a seat belt? No   Have you felt unusual stress, anger or loneliness in the last month? No   Who do you live with? Spouse   If you need help, do you have trouble finding someone available to you? No   Have you been bothered in the last four weeks by sexual problems? No   Do you have difficulty concentrating, remembering or making decisions? No       Age-appropriate Screening Schedule:  Refer to the list below for future screening recommendations based on patient's age, sex and/or medical conditions. Orders for these recommended tests are listed in the plan section. The patient has been provided with a written plan.    Health Maintenance   Topic Date Due   • ZOSTER VACCINE (1 of 2) Never done   • DXA SCAN  11/06/2021   • MAMMOGRAM  06/02/2023   • LIPID PANEL  02/28/2024   • ANNUAL WELLNESS VISIT  03/13/2024   • TDAP/TD VACCINES (2 - Tdap) 11/05/2029   • COVID-19 Vaccine  Completed   • INFLUENZA VACCINE  Completed   • Pneumococcal Vaccine 65+  Completed                CMS Preventative Services Quick Reference  Risk Factors  "Identified During Encounter  Immunizations Discussed/Encouraged: Hepatitis A Vaccine/Series, Shingrix and COVID19  Tobacco Use/Dependance Risk (use dotphrase .tobaccocessation for documentation)  encouarged to stop smoking    The above risks/problems have been discussed with the patient.  Pertinent information has been shared with the patient in the After Visit Summary.  An After Visit Summary and PPPS were made available to the patient.    Follow Up:   Next Medicare Wellness visit to be scheduled in 1 year.       Additional E&M Note during same encounter follows:  Patient has multiple medical problems which are significant and separately identifiable that require additional work above and beyond the Medicare Wellness Visit.      Chief Complaint  Annual Exam  tob dep  Dermatitis  Osteoporosis    Subjective        HPI  Trice Melara is also being seen today for tob dep, rash, htn. Patient notes a new rash below her right breast. She also has skin changes that are pruritic B ankles. She has not recently been to her dermatologist. Continues to smoke several cigarettes daily. Is not interested in cessation at this time. She has medically managed htn and notes normotensive bp at home. History of osteoporosis.                Objective   Vital Signs:  /76   Pulse 100   Ht 149.9 cm (59\")   Wt 55.8 kg (123 lb)   SpO2 96%   BMI 24.84 kg/m²     Physical Exam  Vitals and nursing note reviewed.   Constitutional:       Appearance: Normal appearance. She is well-developed.   HENT:      Head: Normocephalic and atraumatic.      Right Ear: Tympanic membrane and external ear normal.      Left Ear: Tympanic membrane and external ear normal.      Nose: Nose normal.      Mouth/Throat:      Mouth: Mucous membranes are moist.   Eyes:      Extraocular Movements: Extraocular movements intact.      Pupils: Pupils are equal, round, and reactive to light.   Cardiovascular:      Rate and Rhythm: Normal rate and regular rhythm.      " Pulses: Normal pulses.      Heart sounds: Normal heart sounds.   Pulmonary:      Effort: Pulmonary effort is normal. No respiratory distress.      Breath sounds: Normal breath sounds.   Abdominal:      General: Abdomen is flat.      Palpations: Abdomen is soft.   Genitourinary:     General: Normal vulva.      Rectum: Normal.      Comments: Bladder prolapse    Musculoskeletal:         General: Normal range of motion.      Cervical back: Normal range of motion and neck supple.   Skin:     General: Skin is warm and dry.   Neurological:      General: No focal deficit present.      Mental Status: She is alert and oriented to person, place, and time.   Psychiatric:         Mood and Affect: Mood normal.         Behavior: Behavior normal.         Thought Content: Thought content normal.         Judgment: Judgment normal.          The following data was reviewed by: Maia Whitney MD on 03/13/2023:  CMP    CMP 2/28/23   Glucose 98   BUN 9   Creatinine 0.75   Sodium 144   Potassium 3.4 (A)   Chloride 99   Calcium 9.1   Total Protein 6.7   Albumin 4.6   Globulin 2.1   Total Bilirubin 0.6   Alkaline Phosphatase 62   AST (SGOT) 17   ALT (SGPT) 12   BUN/Creatinine Ratio 12.0   (A) Abnormal value            CBC w/diff    CBC w/Diff 2/28/23   WBC 6.13   RBC 5.16   Hemoglobin 14.3   Hematocrit 43.9   MCV 85.1   MCH 27.7   MCHC 32.6   RDW 12.8   Platelets 218   Neutrophil Rel % 54.7   Lymphocyte Rel % 30.7   Monocyte Rel % 8.6   Eosinophil Rel % 3.8   Basophil Rel % 1.5           Lipid Panel    Lipid Panel 2/28/23   Total Cholesterol 171   Triglycerides 113   HDL Cholesterol 62 (A)   VLDL Cholesterol 20   LDL Cholesterol  89   (A) Abnormal value       Comments are available for some flowsheets but are not being displayed.           TSH    TSH 2/28/23   TSH 2.630           UA    Urinalysis 2/28/23 2/28/23    1017 1017   Blood, UA Negative    Leukocytes, UA 2+ (A)    Nitrite, UA Negative    RBC, UA  None seen   Bacteria, UA  Many (A)    (A) Abnormal value                       Assessment and Plan   Diagnoses and all orders for this visit:    1. Healthcare maintenance (Primary)    2. Encounter for screening mammogram for breast cancer  -     Mammo screening digital tomosynthesis bilateral w CAD; Future    3. Menopause  -     DEXA Bone Density Axial    4. Age-related osteoporosis without current pathological fracture    5. Hypertension, unspecified type    6. Dermatitis    Other orders  -     clotrimazole-betamethasone (Lotrisone) 1-0.05 % cream; Apply 1 application topically to the appropriate area as directed 2 (Two) Times a Day. Left breast  Dispense: 30 g; Refill: 2      Patient will try lotrisone for dermatitis beneath her breast. Cortisone cream w/ benadryl gel to ankles B. She is to see her dermatologist for an annual evaluation. Patient will monitor bp and continue arb. She is encouraged smoking cessation and is offered support when ready to quit. She will get a screening mammogram and DEXa testing. She will f/u here in1 year or prn     I spent 45 minutes caring for Trice on this date of service. This time includes time spent by me in the following activities:preparing for the visit, reviewing tests, obtaining and/or reviewing a separately obtained history, performing a medically appropriate examination and/or evaluation , counseling and educating the patient/family/caregiver, ordering medications, tests, or procedures, referring and communicating with other health care professionals , documenting information in the medical record and independently interpreting results and communicating that information with the patient/family/caregiver  Follow Up   Return in about 1 year (around 3/13/2024) for Medicare Wellness.  Patient was given instructions and counseling regarding her condition or for health maintenance advice. Please see specific information pulled into the AVS if appropriate.

## 2023-03-17 DIAGNOSIS — M81.0 OSTEOPOROSIS, UNSPECIFIED OSTEOPOROSIS TYPE, UNSPECIFIED PATHOLOGICAL FRACTURE PRESENCE: Primary | ICD-10-CM

## 2023-03-17 DIAGNOSIS — Z78.0 MENOPAUSE: ICD-10-CM

## 2023-04-28 RX ORDER — OMEPRAZOLE 20 MG/1
20 CAPSULE, DELAYED RELEASE ORAL DAILY
Qty: 90 CAPSULE | Refills: 0 | OUTPATIENT
Start: 2023-04-28

## 2023-05-01 RX ORDER — MONTELUKAST SODIUM 4 MG/1
TABLET, CHEWABLE ORAL
Qty: 60 TABLET | Refills: 3 | Status: SHIPPED | OUTPATIENT
Start: 2023-05-01

## 2023-07-24 ENCOUNTER — TELEPHONE (OUTPATIENT)
Dept: INTERNAL MEDICINE | Facility: CLINIC | Age: 83
End: 2023-07-24
Payer: MEDICARE

## 2023-07-24 RX ORDER — OMEPRAZOLE 20 MG/1
20 CAPSULE, DELAYED RELEASE ORAL DAILY
Qty: 90 CAPSULE | Refills: 0 | OUTPATIENT
Start: 2023-07-24

## 2023-07-24 RX ORDER — OMEPRAZOLE 20 MG/1
20 CAPSULE, DELAYED RELEASE ORAL DAILY
Qty: 90 CAPSULE | Refills: 1 | Status: SHIPPED | OUTPATIENT
Start: 2023-07-24

## 2023-07-24 NOTE — TELEPHONE ENCOUNTER
Caller: Trice Melara    Relationship: Self    Best call back number: [phone]  OR cell- [phone]   Who are you requesting to speak with (clinical staff, provider,  specific staff member): CLINICAL STAFF     What was the call regarding: WANTING TO KNOW WHY HER OMEPRAZOLE WAS DENIED PLEASE CALL AND ADVISE

## 2023-08-22 RX ORDER — LOSARTAN POTASSIUM 25 MG/1
25 TABLET ORAL DAILY
Qty: 30 TABLET | Refills: 5 | Status: SHIPPED | OUTPATIENT
Start: 2023-08-22

## 2023-09-05 RX ORDER — MONTELUKAST SODIUM 4 MG/1
TABLET, CHEWABLE ORAL
Qty: 60 TABLET | Refills: 3 | Status: SHIPPED | OUTPATIENT
Start: 2023-09-05

## 2023-09-25 ENCOUNTER — HOSPITAL ENCOUNTER (OUTPATIENT)
Dept: MAMMOGRAPHY | Facility: HOSPITAL | Age: 83
Discharge: HOME | End: 2023-09-25
Payer: MEDICARE

## 2023-09-25 ENCOUNTER — HOSPITAL ENCOUNTER (OUTPATIENT)
Dept: BONE DENSITY | Facility: HOSPITAL | Age: 83
Discharge: HOME | End: 2023-09-25
Payer: MEDICARE

## 2023-09-25 DIAGNOSIS — Z12.31 ENCOUNTER FOR SCREENING MAMMOGRAM FOR BREAST CANCER: ICD-10-CM

## 2023-09-25 PROCEDURE — 77063 BREAST TOMOSYNTHESIS BI: CPT

## 2023-09-25 PROCEDURE — 77080 DXA BONE DENSITY AXIAL: CPT

## 2023-09-25 PROCEDURE — 77067 SCR MAMMO BI INCL CAD: CPT

## 2023-09-26 ENCOUNTER — FLU SHOT (OUTPATIENT)
Dept: INTERNAL MEDICINE | Facility: CLINIC | Age: 83
End: 2023-09-26
Payer: MEDICARE

## 2023-09-27 ENCOUNTER — TELEPHONE (OUTPATIENT)
Dept: INTERNAL MEDICINE | Facility: CLINIC | Age: 83
End: 2023-09-27
Payer: MEDICARE

## 2023-09-27 PROCEDURE — G0008 ADMIN INFLUENZA VIRUS VAC: HCPCS | Performed by: INTERNAL MEDICINE

## 2023-09-27 PROCEDURE — 90662 IIV NO PRSV INCREASED AG IM: CPT | Performed by: INTERNAL MEDICINE

## 2023-09-27 NOTE — TELEPHONE ENCOUNTER
CALLED PATIENT AND NOTIFIED THAT THEY CAN GET THE SHOTS TOGETHER BUT WE ADVISE NOT TO IN CASE THERE IS A REACTION.

## 2023-09-27 NOTE — TELEPHONE ENCOUNTER
Caller: Trice Melara    Relationship: Self    Best call back number: [phone]     What was the call regarding: PATIENT IS NEEDING TO KNOW IF SHE CAN GET THE COVID SHOT AND THE RSV SHOT TOGETHER OR IF SHE NEEDS TO DO THEM ON DIFFERENT DAYS. PLEASE ADVISE.

## 2023-09-28 ENCOUNTER — TELEPHONE (OUTPATIENT)
Dept: INTERNAL MEDICINE | Facility: CLINIC | Age: 83
End: 2023-09-28
Payer: MEDICARE

## 2023-09-29 ENCOUNTER — TELEPHONE (OUTPATIENT)
Dept: INTERNAL MEDICINE | Facility: CLINIC | Age: 83
End: 2023-09-29
Payer: MEDICARE

## 2023-09-29 NOTE — TELEPHONE ENCOUNTER
----- Message from Maia Whitney MD sent at 9/29/2023 12:29 PM EDT -----  Bone density testing reveals osteopenia. Advise vitamin d 2000iu daily w/ 1500 mg dietary calcium and weight bearing exercies. Given history of hip fracture she is eligible for therapy for bone health if she is interested. Should repeat DEXA screening in 2 years.   santi

## 2024-01-30 RX ORDER — MONTELUKAST SODIUM 4 MG/1
TABLET, CHEWABLE ORAL
Qty: 60 TABLET | Refills: 3 | Status: SHIPPED | OUTPATIENT
Start: 2024-01-30

## 2024-02-19 RX ORDER — LOSARTAN POTASSIUM 25 MG/1
25 TABLET ORAL DAILY
Qty: 30 TABLET | Refills: 5 | Status: SHIPPED | OUTPATIENT
Start: 2024-02-19

## 2024-03-07 DIAGNOSIS — E78.1 HYPERTRIGLYCERIDEMIA: Primary | ICD-10-CM

## 2024-03-07 DIAGNOSIS — N28.9 ABNORMAL KIDNEY FUNCTION: ICD-10-CM

## 2024-03-13 LAB
ALBUMIN SERPL-MCNC: 4.4 G/DL (ref 3.5–5.2)
ALBUMIN/GLOB SERPL: 1.8 G/DL
ALP SERPL-CCNC: 77 U/L (ref 39–117)
ALT SERPL-CCNC: 13 U/L (ref 1–33)
APPEARANCE UR: CLEAR
AST SERPL-CCNC: 18 U/L (ref 1–32)
BACTERIA #/AREA URNS HPF: ABNORMAL /HPF
BACTERIA UR CULT: NO GROWTH
BACTERIA UR CULT: NORMAL
BASOPHILS # BLD AUTO: 0.08 10*3/MM3 (ref 0–0.2)
BASOPHILS NFR BLD AUTO: 1 % (ref 0–1.5)
BILIRUB SERPL-MCNC: 0.6 MG/DL (ref 0–1.2)
BILIRUB UR QL STRIP: NEGATIVE
BUN SERPL-MCNC: 10 MG/DL (ref 8–23)
BUN/CREAT SERPL: 12.7 (ref 7–25)
CALCIUM SERPL-MCNC: 9.6 MG/DL (ref 8.6–10.5)
CASTS URNS QL MICRO: ABNORMAL /LPF
CHLORIDE SERPL-SCNC: 102 MMOL/L (ref 98–107)
CHOLEST SERPL-MCNC: 168 MG/DL (ref 0–200)
CO2 SERPL-SCNC: 28.5 MMOL/L (ref 22–29)
COLOR UR: YELLOW
CREAT SERPL-MCNC: 0.79 MG/DL (ref 0.57–1)
EGFRCR SERPLBLD CKD-EPI 2021: 73.4 ML/MIN/1.73
EOSINOPHIL # BLD AUTO: 0.29 10*3/MM3 (ref 0–0.4)
EOSINOPHIL NFR BLD AUTO: 3.6 % (ref 0.3–6.2)
EPI CELLS #/AREA URNS HPF: ABNORMAL /HPF (ref 0–10)
ERYTHROCYTE [DISTWIDTH] IN BLOOD BY AUTOMATED COUNT: 12.1 % (ref 12.3–15.4)
GLOBULIN SER CALC-MCNC: 2.4 GM/DL
GLUCOSE SERPL-MCNC: 98 MG/DL (ref 65–99)
GLUCOSE UR QL STRIP: NEGATIVE
HCT VFR BLD AUTO: 43.7 % (ref 34–46.6)
HDLC SERPL-MCNC: 69 MG/DL (ref 40–60)
HGB BLD-MCNC: 14 G/DL (ref 12–15.9)
HGB UR QL STRIP: NEGATIVE
IMM GRANULOCYTES # BLD AUTO: 0.03 10*3/MM3 (ref 0–0.05)
IMM GRANULOCYTES NFR BLD AUTO: 0.4 % (ref 0–0.5)
KETONES UR QL STRIP: NEGATIVE
LDLC SERPL CALC-MCNC: 83 MG/DL (ref 0–100)
LEUKOCYTE ESTERASE UR QL STRIP: ABNORMAL
LYMPHOCYTES # BLD AUTO: 1.31 10*3/MM3 (ref 0.7–3.1)
LYMPHOCYTES NFR BLD AUTO: 16.2 % (ref 19.6–45.3)
MCH RBC QN AUTO: 27.2 PG (ref 26.6–33)
MCHC RBC AUTO-ENTMCNC: 32 G/DL (ref 31.5–35.7)
MCV RBC AUTO: 85 FL (ref 79–97)
MICRO URNS: ABNORMAL
MONOCYTES # BLD AUTO: 0.63 10*3/MM3 (ref 0.1–0.9)
MONOCYTES NFR BLD AUTO: 7.8 % (ref 5–12)
NEUTROPHILS # BLD AUTO: 5.76 10*3/MM3 (ref 1.7–7)
NEUTROPHILS NFR BLD AUTO: 71 % (ref 42.7–76)
NITRITE UR QL STRIP: POSITIVE
NRBC BLD AUTO-RTO: 0 /100 WBC (ref 0–0.2)
PH UR STRIP: 6 [PH] (ref 5–7.5)
PLATELET # BLD AUTO: 171 10*3/MM3 (ref 140–450)
POTASSIUM SERPL-SCNC: 3.9 MMOL/L (ref 3.5–5.2)
PROT SERPL-MCNC: 6.8 G/DL (ref 6–8.5)
PROT UR QL STRIP: NEGATIVE
RBC # BLD AUTO: 5.14 10*6/MM3 (ref 3.77–5.28)
RBC #/AREA URNS HPF: ABNORMAL /HPF (ref 0–2)
SODIUM SERPL-SCNC: 143 MMOL/L (ref 136–145)
SP GR UR STRIP: 1.01 (ref 1–1.03)
TRIGL SERPL-MCNC: 90 MG/DL (ref 0–150)
TSH SERPL DL<=0.005 MIU/L-ACNC: 2.69 UIU/ML (ref 0.27–4.2)
URINALYSIS REFLEX: ABNORMAL
UROBILINOGEN UR STRIP-MCNC: 0.2 MG/DL (ref 0.2–1)
VLDLC SERPL CALC-MCNC: 16 MG/DL (ref 5–40)
WBC # BLD AUTO: 8.1 10*3/MM3 (ref 3.4–10.8)
WBC #/AREA URNS HPF: ABNORMAL /HPF (ref 0–5)

## 2024-03-15 ENCOUNTER — OFFICE VISIT (OUTPATIENT)
Dept: INTERNAL MEDICINE | Facility: CLINIC | Age: 84
End: 2024-03-15
Payer: MEDICARE

## 2024-03-15 VITALS
HEIGHT: 59 IN | DIASTOLIC BLOOD PRESSURE: 66 MMHG | OXYGEN SATURATION: 96 % | SYSTOLIC BLOOD PRESSURE: 114 MMHG | BODY MASS INDEX: 24.19 KG/M2 | WEIGHT: 120 LBS | HEART RATE: 88 BPM

## 2024-03-15 DIAGNOSIS — Z12.83 SKIN CANCER SCREENING: ICD-10-CM

## 2024-03-15 DIAGNOSIS — I10 HYPERTENSION, UNSPECIFIED TYPE: ICD-10-CM

## 2024-03-15 DIAGNOSIS — F17.200 TOBACCO DEPENDENCE: ICD-10-CM

## 2024-03-15 DIAGNOSIS — Z13.6 ENCOUNTER FOR SCREENING FOR VASCULAR DISEASE: ICD-10-CM

## 2024-03-15 DIAGNOSIS — L30.9 DERMATITIS: ICD-10-CM

## 2024-03-15 DIAGNOSIS — Z12.2 ENCOUNTER FOR SCREENING FOR LUNG CANCER: ICD-10-CM

## 2024-03-15 DIAGNOSIS — Z00.00 HEALTHCARE MAINTENANCE: Primary | ICD-10-CM

## 2024-03-15 DIAGNOSIS — R01.1 HEART MURMUR: ICD-10-CM

## 2024-03-15 RX ORDER — FAMOTIDINE 40 MG/1
40 TABLET, FILM COATED ORAL DAILY
Qty: 90 TABLET | Refills: 3 | Status: SHIPPED | OUTPATIENT
Start: 2024-03-15

## 2024-03-15 NOTE — PROGRESS NOTES
The ABCs of the Annual Wellness Visit  Subsequent Medicare Wellness Visit    Subjective    Trice Melara is a 83 y.o. female who presents for a Subsequent Medicare Wellness Visit.    The following portions of the patient's history were reviewed and   updated as appropriate: allergies, current medications, past family history, past medical history, past social history, past surgical history, and problem list.    Compared to one year ago, the patient feels her physical   health is the same.    Compared to one year ago, the patient feels her mental   health is the same.    Recent Hospitalizations:  She was not admitted to the hospital during the last year.       Current Medical Providers:  Patient Care Team:  Maia Whitney MD as PCP - General (Internal Medicine)  Rey Madrid MD as Consulting Physician (Hematology and Oncology)  Bolivar Glover MD as Surgeon (Orthopedic Surgery)    Outpatient Medications Prior to Visit   Medication Sig Dispense Refill    clotrimazole-betamethasone (Lotrisone) 1-0.05 % cream Apply 1 application topically to the appropriate area as directed 2 (Two) Times a Day. Left breast 30 g 2    colestipol (COLESTID) 1 g tablet TAKE 1 TABLET BY MOUTH TWICE DAILY 60 tablet 3    desonide (DESOWEN) 0.05 % ointment Apply  topically to the appropriate area as directed 2 (Two) Times a Day. 30 g 1    diphenoxylate-atropine (LOMOTIL) 2.5-0.025 MG per tablet Take 1 tablet by mouth 4 (Four) Times a Day As Needed.      loperamide (IMODIUM) 2 MG capsule TAKE 1 CAPSULE BY MOUTH EVERY 6 HOURS AS NEEDED FOR DIARRHEA      losartan (COZAAR) 25 MG tablet TAKE 1 TABLET BY MOUTH DAILY 30 tablet 5    omeprazole (priLOSEC) 20 MG capsule Take 1 capsule by mouth Daily. 90 capsule 1     No facility-administered medications prior to visit.       No opioid medication identified on active medication list. I have reviewed chart for other potential  high risk medication/s and harmful drug interactions in the  "elderly.        Aspirin is not on active medication list.  Aspirin use is not indicated based on review of current medical condition/s. Risk of harm outweighs potential benefits.  .    Patient Active Problem List   Diagnosis    Gastroesophageal reflux disease    Abnormal kidney function    Primary osteoarthritis of knees, bilateral    Closed displaced intertrochanteric fracture of right femur    Osteoporosis    Status post right knee replacement    Hypertriglyceridemia     Advance Care Planning   Advance Care Planning     Advance Directive is on file.  ACP discussion was held with the patient during this visit. Patient has an advance directive in EMR which is still valid.      Objective    Vitals:    03/15/24 1029   BP: 114/66   Pulse: 88   SpO2: 96%   Weight: 54.4 kg (120 lb)   Height: 149.9 cm (59.02\")     Estimated body mass index is 24.22 kg/m² as calculated from the following:    Height as of this encounter: 149.9 cm (59.02\").    Weight as of this encounter: 54.4 kg (120 lb).    BMI is within normal parameters. No other follow-up for BMI required.      Does the patient have evidence of cognitive impairment? No    Lab Results   Component Value Date    CHLPL 168 03/11/2024    TRIG 90 03/11/2024    HDL 69 (H) 03/11/2024    LDL 83 03/11/2024    VLDL 16 03/11/2024        HEALTH RISK ASSESSMENT    Smoking Status:  Social History     Tobacco Use   Smoking Status Some Days    Current packs/day: 0.25    Average packs/day: 0.3 packs/day for 50.0 years (12.5 ttl pk-yrs)    Types: Cigarettes   Smokeless Tobacco Never   Tobacco Comments    5-6 CIG DAY     Alcohol Consumption:  Social History     Substance and Sexual Activity   Alcohol Use Yes    Comment: 1-2 per evening     Fall Risk Screen:    STEADI Fall Risk Assessment was completed, and patient is at LOW risk for falls.Assessment completed on:3/15/2024    Depression Screening:      3/15/2024    10:31 AM   PHQ-2/PHQ-9 Depression Screening   Little Interest or Pleasure in " Doing Things 0-->not at all   Feeling Down, Depressed or Hopeless 0-->not at all   PHQ-9: Brief Depression Severity Measure Score 0       Health Habits and Functional and Cognitive Screening:      3/15/2024    10:35 AM   Functional & Cognitive Status   Do you have difficulty preparing food and eating? No   Do you have difficulty bathing yourself, getting dressed or grooming yourself? No   Do you have difficulty using the toilet? No   Do you have difficulty moving around from place to place? No   Do you have trouble with steps or getting out of a bed or a chair? No   Do you need help using the phone?  No   Are you deaf or do you have serious difficulty hearing?  No   Do you need help to go to places out of walking distance? No   Do you need help shopping? No   Do you need help preparing meals?  No   Do you need help with housework?  No   Do you need help with laundry? No   Do you need help taking your medications? No   Do you need help managing money? No   Do you ever drive or ride in a car without wearing a seat belt? No   Have you felt unusual stress, anger or loneliness in the last month? No   Who do you live with? Spouse   If you need help, do you have trouble finding someone available to you? No   Have you been bothered in the last four weeks by sexual problems? No   Do you have difficulty concentrating, remembering or making decisions? No       Age-appropriate Screening Schedule:  Refer to the list below for future screening recommendations based on patient's age, sex and/or medical conditions. Orders for these recommended tests are listed in the plan section. The patient has been provided with a written plan.    Health Maintenance   Topic Date Due    ZOSTER VACCINE (1 of 2) Never done    RSV Vaccine - Adults (1 - 1-dose 60+ series) Never done    LIPID PANEL  03/11/2025    ANNUAL WELLNESS VISIT  03/15/2025    DXA SCAN  09/25/2025    TDAP/TD VACCINES (2 - Tdap) 11/05/2029    COVID-19 Vaccine  Completed     "INFLUENZA VACCINE  Completed    Pneumococcal Vaccine 65+  Completed                  CMS Preventative Services Quick Reference  Risk Factors Identified During Encounter  Immunizations Discussed/Encouraged: Shingrix and RSV (Respiratory Syncytial Virus)  The above risks/problems have been discussed with the patient.  Pertinent information has been shared with the patient in the After Visit Summary.  An After Visit Summary and PPPS were made available to the patient.    Follow Up:   Next Medicare Wellness visit to be scheduled in 1 year.       Additional E&M Note during same encounter follows:  Patient has multiple medical problems which are significant and separately identifiable that require additional work above and beyond the Medicare Wellness Visit.      Chief Complaint  Annual Exam  Htn  Lenin  Osteopenia  Tob dependence    Subjective        HPI  Trice Melara is also being seen today for routine follow up evaluation. She is doing well today. She has htn. Has not recently tested bp but notes it is normal when tested at md. She does not have reflux if taking pepcid daily. She continues to smoke and has routine etoh. Patient has osteopenia         Objective   Vital Signs:  /66   Pulse 88   Ht 149.9 cm (59.02\")   Wt 54.4 kg (120 lb)   SpO2 96%   BMI 24.22 kg/m²     Physical Exam  Vitals and nursing note reviewed.   Constitutional:       Appearance: Normal appearance. She is well-developed.   HENT:      Head: Normocephalic and atraumatic.      Right Ear: Tympanic membrane and external ear normal.      Left Ear: Tympanic membrane and external ear normal.      Nose: Nose normal.      Mouth/Throat:      Mouth: Mucous membranes are moist.   Eyes:      Extraocular Movements: Extraocular movements intact.      Pupils: Pupils are equal, round, and reactive to light.   Cardiovascular:      Rate and Rhythm: Normal rate and regular rhythm.      Pulses: Normal pulses.      Heart sounds: Normal heart sounds. "   Pulmonary:      Effort: Pulmonary effort is normal. No respiratory distress.      Breath sounds: Normal breath sounds.   Abdominal:      General: Abdomen is flat.      Palpations: Abdomen is soft.   Musculoskeletal:         General: Normal range of motion.      Cervical back: Neck supple.   Skin:     General: Skin is warm and dry.   Neurological:      General: No focal deficit present.      Mental Status: She is alert and oriented to person, place, and time.   Psychiatric:         Mood and Affect: Mood normal.         Behavior: Behavior normal.         Thought Content: Thought content normal.         Judgment: Judgment normal.          The following data was reviewed by: Maia Whitney MD on 03/15/2024:  CMP          3/11/2024    10:11   CMP   Glucose 98    BUN 10    Creatinine 0.79    Sodium 143    Potassium 3.9    Chloride 102    Calcium 9.6    Total Protein 6.8    Albumin 4.4    Globulin 2.4    Total Bilirubin 0.6    Alkaline Phosphatase 77    AST (SGOT) 18    ALT (SGPT) 13    BUN/Creatinine Ratio 12.7      CBC w/diff          3/11/2024    10:11   CBC w/Diff   WBC 8.10    RBC 5.14    Hemoglobin 14.0    Hematocrit 43.7    MCV 85.0    MCH 27.2    MCHC 32.0    RDW 12.1    Platelets 171    Neutrophil Rel % 71.0    Lymphocyte Rel % 16.2    Monocyte Rel % 7.8    Eosinophil Rel % 3.6    Basophil Rel % 1.0      Lipid Panel          3/11/2024    10:11   Lipid Panel   Total Cholesterol 168    Triglycerides 90    HDL Cholesterol 69    VLDL Cholesterol 16    LDL Cholesterol  83      TSH          3/11/2024    10:11   TSH   TSH 2.690      UA          3/11/2024    10:11   Urinalysis   Blood, UA Negative    Leukocytes, UA 1+    Nitrite, UA Positive    RBC, UA None seen    Bacteria, UA Many                 Assessment and Plan   Diagnoses and all orders for this visit:    1. Healthcare maintenance (Primary)    2. Tobacco dependence  -     CT Chest Low Dose Wo  -     ECG 12 Lead    3. Encounter for screening for lung cancer  -      CT Chest Low Dose Wo    4. Skin cancer screening  -     Ambulatory Referral to Dermatology    5. Dermatitis  -     Ambulatory Referral to Dermatology    6. Heart murmur  -     Adult Transthoracic Echo Complete W/ Cont if Necessary Per Protocol; Future  -     ECG 12 Lead    7. Encounter for screening for vascular disease  -     Vascular Screening (Bundle) CAR; Future    8. Hypertension, unspecified type  -     ECG 12 Lead    Other orders  -     famotidine (PEPCID) 40 MG tablet; Take 1 tablet by mouth Daily.  Dispense: 90 tablet; Refill: 3      - tob dep. Counseled on cessation. Ct scan for ca screen.   - heart murmur on eval. Will get echo.   Has htn- sent for vasc screening. Echo.   Osteopenia- wt bearing, tob daisha, jimena and vit d. Consider fosamax v other.   Lenin- pepcid daily.        I spent 54 minutes caring for Trice on this date of service. This time includes time spent by me in the following activities:preparing for the visit, reviewing tests, obtaining and/or reviewing a separately obtained history, performing a medically appropriate examination and/or evaluation , counseling and educating the patient/family/caregiver, ordering medications, tests, or procedures, referring and communicating with other health care professionals , documenting information in the medical record, and independently interpreting results and communicating that information with the patient/family/caregiver  Follow Up   Return in about 6 months (around 9/15/2024) for Recheck.  Patient was given instructions and counseling regarding her condition or for health maintenance advice. Please see specific information pulled into the AVS if appropriate.

## 2024-04-29 RX ORDER — MONTELUKAST SODIUM 4 MG/1
TABLET, CHEWABLE ORAL
Qty: 180 TABLET | Refills: 1 | Status: SHIPPED | OUTPATIENT
Start: 2024-04-29

## 2024-05-02 RX ORDER — CLOTRIMAZOLE AND BETAMETHASONE DIPROPIONATE 10; .64 MG/G; MG/G
CREAM TOPICAL
Qty: 30 G | Refills: 2 | Status: SHIPPED | OUTPATIENT
Start: 2024-05-02

## 2024-05-24 RX ORDER — LOSARTAN POTASSIUM 25 MG/1
25 TABLET ORAL DAILY
Qty: 30 TABLET | Refills: 5 | Status: SHIPPED | OUTPATIENT
Start: 2024-05-24

## 2024-06-28 ENCOUNTER — HOSPITAL ENCOUNTER (OUTPATIENT)
Dept: CARDIOLOGY | Facility: HOSPITAL | Age: 84
Discharge: HOME | End: 2024-06-28
Payer: MEDICARE

## 2024-06-28 ENCOUNTER — HOSPITAL ENCOUNTER (OUTPATIENT)
Dept: CT IMAGING | Facility: HOSPITAL | Age: 84
Discharge: HOME | End: 2024-06-28
Payer: MEDICARE

## 2024-06-28 VITALS
HEIGHT: 59 IN | DIASTOLIC BLOOD PRESSURE: 76 MMHG | WEIGHT: 120 LBS | SYSTOLIC BLOOD PRESSURE: 121 MMHG | BODY MASS INDEX: 24.19 KG/M2

## 2024-06-28 DIAGNOSIS — R01.1 HEART MURMUR: ICD-10-CM

## 2024-06-28 LAB
BH CV ECHO MEAS - ACS: 1.68 CM
BH CV ECHO MEAS - AI P1/2T: 320.7 MSEC
BH CV ECHO MEAS - AO MAX PG: 13.5 MMHG
BH CV ECHO MEAS - AO MEAN PG: 6.1 MMHG
BH CV ECHO MEAS - AO ROOT DIAM: 3.3 CM
BH CV ECHO MEAS - AO V2 MAX: 183.4 CM/SEC
BH CV ECHO MEAS - AO V2 VTI: 34.3 CM
BH CV ECHO MEAS - AVA(I,D): 1.51 CM2
BH CV ECHO MEAS - EDV(CUBED): 73.2 ML
BH CV ECHO MEAS - EDV(MOD-SP2): 62 ML
BH CV ECHO MEAS - EDV(MOD-SP4): 65 ML
BH CV ECHO MEAS - EF(MOD-BP): 62.3 %
BH CV ECHO MEAS - EF(MOD-SP2): 66.1 %
BH CV ECHO MEAS - EF(MOD-SP4): 61.5 %
BH CV ECHO MEAS - ESV(CUBED): 14.9 ML
BH CV ECHO MEAS - ESV(MOD-SP2): 21 ML
BH CV ECHO MEAS - ESV(MOD-SP4): 25 ML
BH CV ECHO MEAS - FS: 41.1 %
BH CV ECHO MEAS - IVS/LVPW: 0.98 CM
BH CV ECHO MEAS - IVSD: 0.69 CM
BH CV ECHO MEAS - LAT PEAK E' VEL: 7.8 CM/SEC
BH CV ECHO MEAS - LV DIASTOLIC VOL/BSA (35-75): 43.8 CM2
BH CV ECHO MEAS - LV MASS(C)D: 83.6 GRAMS
BH CV ECHO MEAS - LV MAX PG: 2.9 MMHG
BH CV ECHO MEAS - LV MEAN PG: 1.51 MMHG
BH CV ECHO MEAS - LV SYSTOLIC VOL/BSA (12-30): 16.8 CM2
BH CV ECHO MEAS - LV V1 MAX: 84.6 CM/SEC
BH CV ECHO MEAS - LV V1 VTI: 17.2 CM
BH CV ECHO MEAS - LVIDD: 4.2 CM
BH CV ECHO MEAS - LVIDS: 2.46 CM
BH CV ECHO MEAS - LVOT AREA: 3 CM2
BH CV ECHO MEAS - LVOT DIAM: 1.96 CM
BH CV ECHO MEAS - LVPWD: 0.7 CM
BH CV ECHO MEAS - MED PEAK E' VEL: 6 CM/SEC
BH CV ECHO MEAS - MR MAX PG: 146.6 MMHG
BH CV ECHO MEAS - MR MAX VEL: 605.4 CM/SEC
BH CV ECHO MEAS - MV A DUR: 0.13 SEC
BH CV ECHO MEAS - MV A MAX VEL: 95.4 CM/SEC
BH CV ECHO MEAS - MV DEC SLOPE: 527.7 CM/SEC2
BH CV ECHO MEAS - MV DEC TIME: 0.2 SEC
BH CV ECHO MEAS - MV E MAX VEL: 75.1 CM/SEC
BH CV ECHO MEAS - MV E/A: 0.79
BH CV ECHO MEAS - MV MAX PG: 4.5 MMHG
BH CV ECHO MEAS - MV MEAN PG: 2.19 MMHG
BH CV ECHO MEAS - MV P1/2T: 46.1 MSEC
BH CV ECHO MEAS - MV V2 VTI: 22.9 CM
BH CV ECHO MEAS - MVA(P1/2T): 4.8 CM2
BH CV ECHO MEAS - MVA(VTI): 2.27 CM2
BH CV ECHO MEAS - PA ACC TIME: 0.13 SEC
BH CV ECHO MEAS - PA V2 MAX: 123.5 CM/SEC
BH CV ECHO MEAS - PULM A REVS DUR: 0.1 SEC
BH CV ECHO MEAS - PULM A REVS VEL: 26.9 CM/SEC
BH CV ECHO MEAS - PULM DIAS VEL: 34.1 CM/SEC
BH CV ECHO MEAS - PULM S/D: 1.33
BH CV ECHO MEAS - PULM SYS VEL: 45.4 CM/SEC
BH CV ECHO MEAS - RAP SYSTOLE: 3 MMHG
BH CV ECHO MEAS - RV MAX PG: 1.32 MMHG
BH CV ECHO MEAS - RV V1 MAX: 57.4 CM/SEC
BH CV ECHO MEAS - RV V1 VTI: 11.5 CM
BH CV ECHO MEAS - RVSP: 22 MMHG
BH CV ECHO MEAS - SV(LVOT): 52 ML
BH CV ECHO MEAS - SV(MOD-SP2): 41 ML
BH CV ECHO MEAS - SV(MOD-SP4): 40 ML
BH CV ECHO MEAS - SVI(LVOT): 35 ML/M2
BH CV ECHO MEAS - SVI(MOD-SP2): 27.6 ML/M2
BH CV ECHO MEAS - SVI(MOD-SP4): 27 ML/M2
BH CV ECHO MEAS - TAPSE (>1.6): 2.29 CM
BH CV ECHO MEAS - TR MAX PG: 19.7 MMHG
BH CV ECHO MEAS - TR MAX VEL: 222.2 CM/SEC
BH CV ECHO MEASUREMENTS AVERAGE E/E' RATIO: 10.88
BH CV XLRA - RV BASE: 2.6 CM
BH CV XLRA - RV LENGTH: 5.1 CM
BH CV XLRA - RV MID: 2.29 CM
BH CV XLRA - TDI S': 10.4 CM/SEC
LEFT ATRIUM VOLUME INDEX: 29.1 ML/M2
SINUS: 2.6 CM
STJ: 2.39 CM

## 2024-06-28 PROCEDURE — 71271 CT THORAX LUNG CANCER SCR C-: CPT

## 2024-06-28 PROCEDURE — 93306 TTE W/DOPPLER COMPLETE: CPT

## 2024-07-08 ENCOUNTER — TELEPHONE (OUTPATIENT)
Dept: INTERNAL MEDICINE | Facility: CLINIC | Age: 84
End: 2024-07-08
Payer: MEDICARE

## 2024-07-08 NOTE — TELEPHONE ENCOUNTER
Pt informed      ----- Message from Maia Whitney sent at 7/7/2024  9:03 AM EDT -----  CT lung cancer screening is negative for suspicious nodules. Repeat testing is advised in one year.   santi

## 2024-07-09 DIAGNOSIS — I35.0 AORTIC VALVE STENOSIS, ETIOLOGY OF CARDIAC VALVE DISEASE UNSPECIFIED: ICD-10-CM

## 2024-07-09 DIAGNOSIS — I34.0 MITRAL VALVE INSUFFICIENCY, UNSPECIFIED ETIOLOGY: ICD-10-CM

## 2024-07-09 DIAGNOSIS — I34.2 NONRHEUMATIC MITRAL VALVE STENOSIS: Primary | ICD-10-CM

## 2024-07-16 ENCOUNTER — HOSPITAL ENCOUNTER (OUTPATIENT)
Facility: HOSPITAL | Age: 84
Setting detail: OBSERVATION
Discharge: HOME | End: 2024-07-18
Attending: EMERGENCY MEDICINE | Admitting: INTERNAL MEDICINE
Payer: MEDICARE

## 2024-07-16 ENCOUNTER — APPOINTMENT (OUTPATIENT)
Dept: CT IMAGING | Facility: HOSPITAL | Age: 84
End: 2024-07-16
Payer: MEDICARE

## 2024-07-16 DIAGNOSIS — S81.811A SKIN TEAR OF RIGHT LOWER LEG WITHOUT COMPLICATION, INITIAL ENCOUNTER: ICD-10-CM

## 2024-07-16 DIAGNOSIS — S06.5XAA SUBDURAL HEMATOMA: Primary | ICD-10-CM
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
## 2024-07-16 DIAGNOSIS — S01.81XA LACERATION OF FOREHEAD, INITIAL ENCOUNTER: ICD-10-CM

## 2024-07-16 LAB
ALBUMIN SERPL-MCNC: 4.3 G/DL (ref 3.5–5.2)
ALBUMIN/GLOB SERPL: 1.6 G/DL
ALP SERPL-CCNC: 71 U/L (ref 39–117)
ALT SERPL W P-5'-P-CCNC: 20 U/L (ref 1–33)
ANION GAP SERPL CALCULATED.3IONS-SCNC: 12 MMOL/L (ref 5–15)
APTT PPP: 26 SECONDS (ref 22.7–35.4)
AST SERPL-CCNC: 37 U/L (ref 1–32)
BASOPHILS # BLD AUTO: 0.07 10*3/MM3 (ref 0–0.2)
BASOPHILS NFR BLD AUTO: 0.7 % (ref 0–1.5)
BILIRUB SERPL-MCNC: 0.7 MG/DL (ref 0–1.2)
BUN SERPL-MCNC: 7 MG/DL (ref 8–23)
BUN/CREAT SERPL: 11.5 (ref 7–25)
CALCIUM SPEC-SCNC: 9.6 MG/DL (ref 8.6–10.5)
CHLORIDE SERPL-SCNC: 99 MMOL/L (ref 98–107)
CO2 SERPL-SCNC: 29 MMOL/L (ref 22–29)
CREAT SERPL-MCNC: 0.61 MG/DL (ref 0.57–1)
DEPRECATED RDW RBC AUTO: 37.3 FL (ref 37–54)
EGFRCR SERPLBLD CKD-EPI 2021: 87.2 ML/MIN/1.73
EOSINOPHIL # BLD AUTO: 0.1 10*3/MM3 (ref 0–0.4)
EOSINOPHIL NFR BLD AUTO: 1 % (ref 0.3–6.2)
ERYTHROCYTE [DISTWIDTH] IN BLOOD BY AUTOMATED COUNT: 12.3 % (ref 12.3–15.4)
GLOBULIN UR ELPH-MCNC: 2.7 GM/DL
GLUCOSE BLDC GLUCOMTR-MCNC: 109 MG/DL (ref 70–130)
GLUCOSE BLDC GLUCOMTR-MCNC: 127 MG/DL (ref 70–130)
GLUCOSE SERPL-MCNC: 118 MG/DL (ref 65–99)
HCT VFR BLD AUTO: 43.3 % (ref 34–46.6)
HGB BLD-MCNC: 14.6 G/DL (ref 12–15.9)
IMM GRANULOCYTES # BLD AUTO: 0.03 10*3/MM3 (ref 0–0.05)
IMM GRANULOCYTES NFR BLD AUTO: 0.3 % (ref 0–0.5)
INR PPP: 0.97 (ref 0.9–1.1)
LYMPHOCYTES # BLD AUTO: 1.72 10*3/MM3 (ref 0.7–3.1)
LYMPHOCYTES NFR BLD AUTO: 16.4 % (ref 19.6–45.3)
MCH RBC QN AUTO: 28.3 PG (ref 26.6–33)
MCHC RBC AUTO-ENTMCNC: 33.7 G/DL (ref 31.5–35.7)
MCV RBC AUTO: 83.9 FL (ref 79–97)
MONOCYTES # BLD AUTO: 1.17 10*3/MM3 (ref 0.1–0.9)
MONOCYTES NFR BLD AUTO: 11.2 % (ref 5–12)
NEUTROPHILS NFR BLD AUTO: 7.39 10*3/MM3 (ref 1.7–7)
NEUTROPHILS NFR BLD AUTO: 70.4 % (ref 42.7–76)
NRBC BLD AUTO-RTO: 0 /100 WBC (ref 0–0.2)
PLATELET # BLD AUTO: 201 10*3/MM3 (ref 140–450)
PMV BLD AUTO: 11 FL (ref 6–12)
POTASSIUM SERPL-SCNC: 2.8 MMOL/L (ref 3.5–5.2)
PROT SERPL-MCNC: 7 G/DL (ref 6–8.5)
PROTHROMBIN TIME: 13 SECONDS (ref 11.7–14.2)
RBC # BLD AUTO: 5.16 10*6/MM3 (ref 3.77–5.28)
SODIUM SERPL-SCNC: 140 MMOL/L (ref 136–145)
WBC NRBC COR # BLD AUTO: 10.48 10*3/MM3 (ref 3.4–10.8)

## 2024-07-16 PROCEDURE — G0378 HOSPITAL OBSERVATION PER HR: HCPCS

## 2024-07-16 PROCEDURE — 82948 REAGENT STRIP/BLOOD GLUCOSE: CPT

## 2024-07-16 PROCEDURE — 85025 COMPLETE CBC W/AUTO DIFF WBC: CPT | Performed by: EMERGENCY MEDICINE

## 2024-07-16 PROCEDURE — 70450 CT HEAD/BRAIN W/O DYE: CPT

## 2024-07-16 PROCEDURE — 99291 CRITICAL CARE FIRST HOUR: CPT

## 2024-07-16 PROCEDURE — 85730 THROMBOPLASTIN TIME PARTIAL: CPT | Performed by: EMERGENCY MEDICINE

## 2024-07-16 PROCEDURE — 72125 CT NECK SPINE W/O DYE: CPT

## 2024-07-16 PROCEDURE — 85610 PROTHROMBIN TIME: CPT | Performed by: EMERGENCY MEDICINE

## 2024-07-16 PROCEDURE — 80053 COMPREHEN METABOLIC PANEL: CPT | Performed by: EMERGENCY MEDICINE

## 2024-07-16 RX ORDER — POTASSIUM CHLORIDE 750 MG/1
40 TABLET, FILM COATED, EXTENDED RELEASE ORAL EVERY 4 HOURS
Status: COMPLETED | OUTPATIENT
Start: 2024-07-16 | End: 2024-07-17

## 2024-07-16 RX ORDER — ONDANSETRON 2 MG/ML
4 INJECTION INTRAMUSCULAR; INTRAVENOUS EVERY 6 HOURS PRN
Status: DISCONTINUED | OUTPATIENT
Start: 2024-07-16 | End: 2024-07-18 | Stop reason: HOSPADM

## 2024-07-16 RX ORDER — LIDOCAINE HYDROCHLORIDE AND EPINEPHRINE 10; 10 MG/ML; UG/ML
20 INJECTION, SOLUTION INFILTRATION; PERINEURAL ONCE
Status: COMPLETED | OUTPATIENT
Start: 2024-07-16 | End: 2024-07-16

## 2024-07-16 RX ORDER — ACETAMINOPHEN 650 MG/1
650 SUPPOSITORY RECTAL EVERY 4 HOURS PRN
Status: DISCONTINUED | OUTPATIENT
Start: 2024-07-16 | End: 2024-07-18 | Stop reason: HOSPADM

## 2024-07-16 RX ORDER — AMOXICILLIN 250 MG
2 CAPSULE ORAL 2 TIMES DAILY
Status: DISCONTINUED | OUTPATIENT
Start: 2024-07-16 | End: 2024-07-18 | Stop reason: HOSPADM

## 2024-07-16 RX ORDER — LABETALOL HYDROCHLORIDE 5 MG/ML
20 INJECTION, SOLUTION INTRAVENOUS
Status: DISCONTINUED | OUTPATIENT
Start: 2024-07-16 | End: 2024-07-18 | Stop reason: HOSPADM

## 2024-07-16 RX ORDER — POLYETHYLENE GLYCOL 3350 17 G/17G
17 POWDER, FOR SOLUTION ORAL DAILY PRN
Status: DISCONTINUED | OUTPATIENT
Start: 2024-07-16 | End: 2024-07-18 | Stop reason: HOSPADM

## 2024-07-16 RX ORDER — BISACODYL 5 MG/1
5 TABLET, DELAYED RELEASE ORAL DAILY PRN
Status: DISCONTINUED | OUTPATIENT
Start: 2024-07-16 | End: 2024-07-18 | Stop reason: HOSPADM

## 2024-07-16 RX ORDER — SODIUM CHLORIDE 0.9 % (FLUSH) 0.9 %
10 SYRINGE (ML) INJECTION EVERY 12 HOURS SCHEDULED
Status: DISCONTINUED | OUTPATIENT
Start: 2024-07-16 | End: 2024-07-18 | Stop reason: HOSPADM

## 2024-07-16 RX ORDER — SODIUM CHLORIDE 9 MG/ML
40 INJECTION, SOLUTION INTRAVENOUS AS NEEDED
Status: DISCONTINUED | OUTPATIENT
Start: 2024-07-16 | End: 2024-07-18 | Stop reason: HOSPADM

## 2024-07-16 RX ORDER — SODIUM CHLORIDE 0.9 % (FLUSH) 0.9 %
10 SYRINGE (ML) INJECTION AS NEEDED
Status: DISCONTINUED | OUTPATIENT
Start: 2024-07-16 | End: 2024-07-18 | Stop reason: HOSPADM

## 2024-07-16 RX ORDER — BISACODYL 10 MG
10 SUPPOSITORY, RECTAL RECTAL DAILY PRN
Status: DISCONTINUED | OUTPATIENT
Start: 2024-07-16 | End: 2024-07-18 | Stop reason: HOSPADM

## 2024-07-16 RX ORDER — ACETAMINOPHEN 325 MG/1
650 TABLET ORAL EVERY 4 HOURS PRN
Status: DISCONTINUED | OUTPATIENT
Start: 2024-07-16 | End: 2024-07-18 | Stop reason: HOSPADM

## 2024-07-16 RX ORDER — FAMOTIDINE 20 MG/1
40 TABLET, FILM COATED ORAL DAILY
Status: DISCONTINUED | OUTPATIENT
Start: 2024-07-17 | End: 2024-07-18 | Stop reason: HOSPADM

## 2024-07-16 RX ORDER — NITROGLYCERIN 0.4 MG/1
0.4 TABLET SUBLINGUAL
Status: DISCONTINUED | OUTPATIENT
Start: 2024-07-16 | End: 2024-07-18 | Stop reason: HOSPADM

## 2024-07-16 RX ADMIN — Medication 10 ML: at 22:26

## 2024-07-16 RX ADMIN — Medication 3 ML: at 17:08

## 2024-07-16 RX ADMIN — POTASSIUM CHLORIDE 40 MEQ: 750 TABLET, EXTENDED RELEASE ORAL at 22:23

## 2024-07-16 RX ADMIN — LIDOCAINE HYDROCHLORIDE AND EPINEPHRINE 20 ML: 10; 10 INJECTION, SOLUTION INFILTRATION; PERINEURAL at 18:36

## 2024-07-16 NOTE — H&P
Group: [facility]        HISTORY AND PHYSICAL    Patient Identification:  Trice Melara  84 y.o.  female  1940  [id number]            CC: mechanical fall    History of Present Illness:  Trice Melara is an 84 year old female with a past medical history of GERD, history of gastric ulcer with a history of bowel perforation, tobacco dependence and hypertension.    She presented to the ED on 7/16/24 with complaint of a mechanical fall with head injury.  She reports that she was in the shower, slipped and fell off of her shower chair and hit her head.  She denied any loss of consciousness or pain, did report that she noted that she had a laceration over her left eyebrow and a very large skin tear on her right leg.  She denies any focal symptoms, felt she was controlling the bleeding did not feel she needed come to the ER.  She had an appointment at her bank which she attended and went to the grocery store.  She kept bleeding from her eyebrow laceration, she tried to go to the [facility] to be seen and they referred her to the ER.  She is not on thinners or antiplatelet therapy.  Denies any dizziness, lightheadedness, chest pain or shortness of breath prior to fall.  ED evaluation included: Potassium 2.8, INR 0.97.  CT head without contrast showed an acute subdural hematoma noted anterior and lateral to the left frontal lobe measuring a maximal diameter approximately 4 mm.  Neurosurgery, Dr. Stern, I recommended ICU admission with a repeat CT head in the morning.    Review of Systems:  CONSTITUTIONAL:  Denies fevers or chills  EYE:  No new vision changes  EAR:  No change in hearing  CARDIAC:  No chest pain  PULMONARY:  No productive cough or shortness of breath  GI:  No diarrhea, hematemesis or hematochezia,  RENAL:  No dysuria or urinary frequency  MUSCULOSKELETAL:  No musculoskeletal complaints  ENDOCRINE:  No heat or cold intolerance  INTEGUMENTARY: Eyebrow laceration, right lower extremity skin  tear  NEUROLOGICAL:  No dizziness or confusion.  No seizure activity  PSYCHIATRIC:  No new anxiety or depression  12 system review of systems performed and all else negative     Past Medical History:  Past Medical History:   Diagnosis Date    Abdominal wall dehiscence 01/08/2016    Fascial Dehiscence    Acute renal failure     Aspiration pneumonia     Colovaginal fistula 2004    Diverticulitis 2010    with paracolonic abscess and colovesical fistula, 2004    Frequent loose stools     Gastric ulcer     GERD (gastroesophageal reflux disease)     H/O Bowel perforation 2004    History of diverticular abscess     History of GI bleed 2010    Osteoarthritis     Peritonitis 01/2016    Septic shock     Small bowel anastomotic leak 01/08/2016       Past Surgical History:  Past Surgical History:   Procedure Laterality Date    BREAST CYST EXCISION      COLON RESECTION  01/08/2016    Smal Bowel Resection -Dr. Syd Maharaj    COLON SURGERY Left 2010    with closure of fistula    EXPLORATORY LAPAROTOMY  12/31/2015    With Extensive Adhesiolysis of over 1 hour to allow conduct of the operation and repair of small intestine-Dr. Syd Maharaj    EXPLORATORY LAPAROTOMY  01/08/2016    Dr. Syd Maharaj    INCISIONAL HERNIA REPAIR  01/08/2016    Incisional Hernia Repair with Fascial Dehiscence Repair with Mesh and Bilateral Component Separation-Dr. Syd Maharaj    KY TX INTER/KY/SUBTRCHNTRIC FEM FX IMED IMPLTSCREW Right 7/24/2017    Procedure: FEMUR INTRAMEDULLARY NAILING/RODDING;  Surgeon: Huan Weiss MD;  Location: [facility] OR;  Service: Orthopedics    TOTAL ABDOMINAL HYSTERECTOMY  1970s    TOTAL KNEE ARTHROPLASTY Right 4/29/2019    Procedure: RT TOTAL KNEE ARTHROPLASTY WITH NAVIGATION;  Surgeon: Bolivar Glover MD;  Location: [facility] OR;  Service: Orthopedics    TOTAL KNEE ARTHROPLASTY Left 9/11/2019    Procedure: TOTAL KNEE ARTHROPLASTY;  Surgeon: Bolivar Glover MD;  Location: [facility] MAIN OR;  Service:  "Orthopedics        Home Meds:  (Not in a hospital admission)      Allergies:  No Known Allergies    Social History:   Social History     Socioeconomic History    Marital status:    Tobacco Use    Smoking status: Some Days     Current packs/day: 0.25     Average packs/day: 0.3 packs/day for 50.0 years (12.5 ttl pk-yrs)     Types: Cigarettes    Smokeless tobacco: Never    Tobacco comments:     5-6 CIG DAY   Substance and Sexual Activity    Alcohol use: Yes     Comment: 1-2 per evening    Drug use: No    Sexual activity: Defer       Family History:  Family History   Problem Relation Age of Onset    Dementia Mother     Thyroid disease Mother     Leukemia Father     Thyroid disease Brother     Malig Hyperthermia Neg Hx        Physical Exam:  /94   Pulse 57   Temp 98.2 °F (36.8 °C)   Resp 16   Ht 149.9 cm (59\")   Wt 54.4 kg (119 lb 14.9 oz)   SpO2 97%   BMI 24.22 kg/m²  Body mass index is 24.22 kg/m². 97% 54.4 kg (119 lb 14.9 oz)  Constitutional: Elderly female pt in bed, No acute respiratory distress, no accessory muscle use  Head: - NCAT  Eyes: No pallor, Anicteric conjunctiva, EOMI.  ENMT:  Mallampati 3, no oral thrush. Dry MM.   NECK: Trachea midline, No thyromegaly, no palpable cervical LNpathy, no JVD  Heart: RRR, no murmur. No pedal edema   Lungs: NIYAH +, No wheezes/ crackles heard    Abdomen: Soft. No tenderness, guarding or rigidity. No palpable masses  Extremities: Extremities warm and well perfused. No cyanosis/ clubbing right lower extremity skin tear wrapped in Kerlix, left eyebrow with sutures  Neuro: Conscious, answers appropriately, no gross focal neuro deficits  Psych: Mood and affect appropriate    PPE recommended per [facility] infectious disease Isolation protocol for the current clinical scenario(as mentioned below) was followed.      LABS:  No results found for: \"COVID19\"    Lab Results   Component Value Date    CALCIUM 9.6 03/11/2024    PHOS 3.5 01/18/2016       Lab Results "   Component Value Date    TROPONINT <0.01 01/01/2016                                 Lab Results   Component Value Date    TSH 2.690 03/11/2024     CrCl cannot be calculated (Patient's most recent lab result is older than the maximum 30 days allowed.).         Imaging: I personally visualized the images of scans/x-rays performed within last 3 days.      Assessment:  Acute subdural hematoma  Mechanical fall  Hypokalemia  Hypertension  GERD  Tobacco abuse    Recommendations:  Acute subdural hematoma  Mechanical fall  CT head reviewed showing acute subdural hematoma anterior and lateral to the left frontal lobe measuring with a maximal diameter approximately 4 mm-no midline shift.  Neurosurgery consulted from the emergency department, recommendations made.  Goal SBP less than 150-nicardipine drip available to maintain goal.  Repeat CT head without contrast in the morning.    Hypokalemia  Potassium 2.8, creatinine 0.61, BUN 7-electrolyte placement protocol initiated.    Hypertension  Home medication: Losartan 25 mg daily, currently on hold.  Restart when appropriate.    GERD  Home medication: Pepcid 40 mg daily, continue.    Tobacco abuse  Recommended tobacco cessation.  Patient does have routine screenings for lung cancer.    Regular diet, patient passed nursing bedside swallow.  SCDs for VTE prophylaxis  Full code.    Patient was placed in face mask upon entering room and kept mask on throughout our encounter. I wore full protective equipment throughout this patient encounter including a face mask, gown and gloves. Hand hygiene was performed before donning protective equipment and after removal when leaving the room.    Felicia Le, APRN  7/16/2024  19:44 EDT      Much of this encounter note is an electronic transcription/translation of spoken language to printed text using Dragon Software.

## 2024-07-16 NOTE — ED PROVIDER NOTES
MD ATTESTATION NOTE  I supervised care provided by the midlevel provider. We have discussed this patient's history, physical exam, and treatment plan. I have reviewed the midlevel provider's note and I agree with the midlevel provider's findings and plan of care.   SHARED VISIT: This visit was performed by BOTH a physician and an APC. The substantive portion of the medical decision making was performed by this attesting physician who made or approved the management plan and takes responsibility for patient management. All studies in the APC note (if performed) were independently interpreted by me.   I have personally had a face to face encounter with the patient.     PCP: Maia Whitney MD  Patient Care Team:  Maia Whitney MD as PCP - General (Internal Medicine)  Rey Madrid MD as Consulting Physician (Hematology and Oncology)  Bolivar Glover MD as Surgeon (Orthopedic Surgery)     Trice Melara is a 84 y.o. female who presents to the ED c/o fall with closed head injury.  Patient reports that she was in the shower, slipped and fell, struck her head.  Patient complains of pain where she struck her head, denies any other headache, no loss consciousness, no confusion.  Patient sustained a laceration at above her left eye.  Denies any vision change, no difficulty with eye movements, no facial anesthesia.  No dental injury, no trismus.  Patient denies any neck pain or radicular pain.  Patient did sustain bruising on her right leg, denies any concern for fracture of her extremities.  Patient is not a blood thinner, tetanus up-to-date.  Patient reports that she was at baseline health prior to fall.    On exam:  General: NAD.  Head: Left periorbital contusion with small laceration at lateral aspect of the left eyebrow.  Visual acuity grossly normal, extraocular motion intact, no facial anesthesia.  No dental injury, no trismus..  ENT: nares patent, no scleral icterus  Neck: Supple, trachea midline.  Cervical spine:  No step-offs or deformities, no midline tenderness palpation.  Cardiac: regular rate and rhythm.  Lungs: normal effort, clear to auscultation bilaterally  Abdomen: Soft, nondistended, NTTP, no rebound tenderness, no guarding or rigidity.   Extremities: Moves all extremities well, no peripheral edema  Neuro: alert, MAEW, follows commands  Psych: calm, cooperative  Skin: Warm, dry.    Medical Decision Making:  After the initial H&P, I discussed pertinent information from history and physical exam with patient/family.  Discussed differential diagnosis.  Discussed plan for ED evaluation/work-up/treatment.  All questions answered.  Patient/family is agreeable with plan.    ED Course as of 07/16/24 2000 Tue Jul 16, 2024 1723 Patient presents with fall with closed head injury, significant risk of morbidity and mortality given patient's age, significant risk for occult cervical spine fracture, obtaining CT imaging.  Plan for wound repair. [JG]   1809 I reviewed CT cervical spine imaging in PACS, no fracture per my read. [JG]   1814 I reviewed CT head imaging in PACS, subdural hemorrhage per my read. [JG]   1815 Phone call with radiologist.  I had previously reviewed the images. I discussed the patient, imaging, and their interpretation.  CT head imaging significant for subdural hemorrhage.  See dictated report for final interpretation.     [JG]   1820 Obtain lab work including coagulation factors, consulting neurosurgeon, plan for admission. [JG]   1841 Patient reassessed, patient neuro intact.  Discussed ED workup and results, discussed plan for admission.  Patient has no questions or concerns at present. [JG]   1855 I spoke with Dr. Stern with neurosurgery. Discussed patient presentation and ED findings. He recommends ICU admit with repeat head CT in the morning. [MP]   1914 I spoke with SARANYA Raphael for ICU.  Discussed patient presentation and ED findings.  She agrees admit patient to an ICU bed to the service of   [facility]. [MP]      ED Course User Index  [JG] Darian Ross MD  [MP] Jeanie Topete, PALilyC     Critical care:  Total critical care time of 35 minutes is exclusive of any other billable procedures and includes time spent with direct patient care and observation, retrospective chart review, management of acute condition, and consultation with other physicians.      Diagnosis  Final diagnoses:   Subdural hematoma   Laceration of forehead, initial encounter   Skin tear of right lower leg without complication, initial encounter   Fall, initial encounter          Darian Ross MD  07/16/24 2000

## 2024-07-16 NOTE — ED PROVIDER NOTES
EMERGENCY DEPARTMENT ENCOUNTER  Room Number:  43/43  PCP: Maia Whitney MD  Independent Historians: Patient      HPI:  Chief Complaint: had concerns including Fall and Head Injury.     A complete HPI/ROS/PMH/PSH/SH/FH are unobtainable due to: None    Chronic or social conditions impacting patient care (Social Determinants of Health): None      Context: Trice Melara is a 84 y.o. female with a medical history of osteoporosis, tobacco dependency, and dermatitis who presents to the ED c/o acute fall with head injury.  Patient reports earlier this afternoon she was in the shower sitting on a stool.  She fell off the stool and hit her forehead.  She denies LOC.  She noted a left forehead laceration and right lower leg skin tear. Patients states that [place] before coming to the ED.  Patient complains of tenderness to palpation on her right leg and head laceration. She denies headache, nausea, vomiting, visual changes, or back pain.  She is not anticoagulated.  Last Tdap November 2019.  Patient has no other systemic complaints at this time.      Review of prior external notes (non-ED) -and- Review of prior external test results outside of this encounter:  Patient's last office visit was on 3/15/24 with internal medicine Dr. Bryan Carvalho for annual visit.  Reviewed labs collected on 3/11/2024.  CBC with hemoglobin 14.0, CMP with creatinine 0.79.    Prescription drug monitoring program review:     N/A    PAST MEDICAL HISTORY  Active Ambulatory Problems     Diagnosis Date Noted    Gastroesophageal reflux disease 03/03/2016    Abnormal kidney function 03/03/2016    Primary osteoarthritis of knees, bilateral 12/20/2016    Closed displaced intertrochanteric fracture of right femur 07/24/2017    Osteoporosis 07/24/2017    Status post right knee replacement 09/11/2019    Hypertriglyceridemia 02/16/2022     Resolved Ambulatory Problems     Diagnosis Date Noted    Knee pain 03/03/2016    Iron deficiency anemia  03/03/2016    Aspiration pneumonia     Acute renal failure     Arthritis of knee 03/17/2016    Arthritis 06/09/2016    Osteoarthrosis, localized, primary, knee 12/20/2016    Fall 07/24/2017    Primary osteoarthritis of right knee 03/15/2019    OA (osteoarthritis) of knee 04/29/2019    Primary osteoarthritis of left knee 07/12/2019     Past Medical History:   Diagnosis Date    Abdominal wall dehiscence 01/08/2016    Colovaginal fistula 2004    Diverticulitis 2010    Frequent loose stools     Gastric ulcer     GERD (gastroesophageal reflux disease)     H/O Bowel perforation 2004    History of diverticular abscess     History of GI bleed 2010    Osteoarthritis     Peritonitis 01/2016    Septic shock     Small bowel anastomotic leak 01/08/2016         PAST SURGICAL HISTORY  Past Surgical History:   Procedure Laterality Date    BREAST CYST EXCISION      COLON RESECTION  01/08/2016    Smal Bowel Resection -Dr. Syd Maharaj    COLON SURGERY Left 2010    with closure of fistula    EXPLORATORY LAPAROTOMY  12/31/2015    With Extensive Adhesiolysis of over 1 hour to allow conduct of the operation and repair of small intestine-Dr. Syd Maharaj    EXPLORATORY LAPAROTOMY  01/08/2016    Dr. Syd Maharaj    INCISIONAL HERNIA REPAIR  01/08/2016    Incisional Hernia Repair with Fascial Dehiscence Repair with Mesh and Bilateral Component Separation-Dr. Syd Maharaj    FL TX INTER/FL/SUBTRCHNTRIC FEM FX IMED IMPLTSCREW Right 7/24/2017    Procedure: FEMUR INTRAMEDULLARY NAILING/RODDING;  Surgeon: Huan Weiss MD;  Location: [facility] OR;  Service: Orthopedics    TOTAL ABDOMINAL HYSTERECTOMY  1970s    TOTAL KNEE ARTHROPLASTY Right 4/29/2019    Procedure: RT TOTAL KNEE ARTHROPLASTY WITH NAVIGATION;  Surgeon: Bolivar Glover MD;  Location: [facility] MAIN OR;  Service: Orthopedics    TOTAL KNEE ARTHROPLASTY Left 9/11/2019    Procedure: TOTAL KNEE ARTHROPLASTY;  Surgeon: Bolivar Glover MD;  Location: [facility] MAIN OR;  Service:  Orthopedics         FAMILY HISTORY  Family History   Problem Relation Age of Onset    Dementia Mother     Thyroid disease Mother     Leukemia Father     Thyroid disease Brother     Malchuy Hyperthermia Neg Hx          SOCIAL HISTORY  Social History     Socioeconomic History    Marital status:    Tobacco Use    Smoking status: Some Days     Current packs/day: 0.25     Average packs/day: 0.3 packs/day for 50.0 years (12.5 ttl pk-yrs)     Types: Cigarettes    Smokeless tobacco: Never    Tobacco comments:     5-6 CIG DAY   Substance and Sexual Activity    Alcohol use: Yes     Comment: 1-2 per evening    Drug use: No    Sexual activity: Defer         ALLERGIES  Patient has no known allergies.      REVIEW OF SYSTEMS  Review of Systems   Constitutional:  Negative for chills and fever.   HENT:  Negative for ear pain and sore throat.    Respiratory:  Negative for cough and shortness of breath.    Cardiovascular:  Negative for chest pain and palpitations.   Gastrointestinal:  Negative for abdominal pain and vomiting.   Genitourinary:  Negative for dysuria and hematuria.   Musculoskeletal:  Negative for arthralgias and joint swelling.   Skin:  Positive for wound. Negative for pallor and rash.   Neurological:  Negative for numbness and headaches.   Psychiatric/Behavioral:  Negative for confusion and hallucinations.      Included in HPI  All systems reviewed and negative except for those discussed in HPI.      PHYSICAL EXAM    I have reviewed the triage vital signs and nursing notes.    ED Triage Vitals   Temp Heart Rate Resp BP SpO2   07/16/24 1606 07/16/24 1606 07/16/24 1606 07/16/24 1607 07/16/24 1606   98.2 °F (36.8 °C) (!) 121 16 147/94 97 %      Temp src Heart Rate Source Patient Position BP Location FiO2 (%)   -- -- -- -- --              Physical Exam  Constitutional:       General: She is not in acute distress.     Appearance: She is well-developed.   HENT:      Head: Normocephalic and atraumatic.      Comments:  There is an approximately 1 cm laceration lateral to the left eyebrow  Eyes:      Extraocular Movements: Extraocular movements intact.   Cardiovascular:      Rate and Rhythm: Normal rate and regular rhythm.      Heart sounds: Normal heart sounds.      Comments: Distal pulses intact  Pulmonary:      Effort: Pulmonary effort is normal.      Breath sounds: Normal breath sounds.   Abdominal:      General: There is no distension.   Skin:     General: Skin is warm.      Comments: 2 skin tears over the anterior right lower extremity.   Neurological:      General: No focal deficit present.      Mental Status: She is alert and oriented to person, place, and time.   Psychiatric:         Mood and Affect: Mood normal.             RADIOLOGY  CT Head Without Contrast, CT Cervical Spine Without Contrast    Result Date: 7/16/2024  CT HEAD AND CERVICAL SPINE WITHOUT CONTRAST  CLINICAL HISTORY: injury. Forehead laceration. Neck pain.  TECHNIQUE: CT scan of the head was obtained with 3 mm axial soft tissue and 2 mm bone algorithm images. No intravenous contrast was administered. Sagittal and coronal reconstructions were obtained.  COMPARISON: None.  FINDINGS:   There is no evidence for a calvarial fracture. However, there is an acute subdural hematoma noted anterior and lateral to the left frontal lobe. This subdural hematoma measures a maximal diameter of approximately 4 mm anterior to the left frontal lobe.  Incidental note is made of an area of fat and calcific density along the undersurface of the right aspect of the hypothalamus. It measures up to approximately 8 mm in diameter and is probably representative of a lipoma or a dermoid.  The ventricles, sulci, and cisterns are age-appropriate. The gray-white matter differentiation is within normal limits. The basal ganglia and thalami are unremarkable in appearance. The posterior fossa structures are within normal limits.  There is a mild size left anterior frontal convexity scalp  hematoma which extends into the left preseptal soft tissues.       Acute subdural hematoma noted anterior and lateral to the left frontal lobe measuring a maximal diameter of approximately 4 mm. No significant mass effect is seen as a result of this subdural hematoma.  8 mm focus of fat and calcific density within the suprasellar cistern intimately associated with the right aspect of the hypothalamus that is compatible with either a lipoma or a dermoid.  Moderate size left anterior frontal convexity scalp hematoma extending into the left preseptal soft tissues.   TECHNIQUE: CT scan of the cervical spine was obtained with 1 mm axial bone algorithm and 2 mm axial soft tissue algorithm images. Sagittal and coronal reconstructed images were obtained.  FINDINGS:  There is no evidence for acute fracture or bony malalignment involving the cervical spine.  Incidental degenerative phenomena are appreciated within the cervical spine with disc osteophyte complexes at C5-6 and C6-7 resulting in up to moderate to severe degrees of stenoses. Multilevel foraminal stenotic changes are also noted from C4-5 down to C6-7.  IMPRESSION:  No evidence for acute fracture or bony malalignment involving the cervical spine.  Incidental degenerative phenomena as discussed above.  These findings were discussed with Dr. Darian Ross on 07/16/2024 at approximately 6:14 p.m.    Radiation dose reduction techniques were utilized, including automated exposure control and exposure modulation based on body size.  This report was finalized on 7/16/2024 6:44 PM by Dr. Jaylan Donato M.D on Workstation: [id number]         MEDICATIONS GIVEN IN ER  Medications   Lido-EPINEPHrine-Tetracaine 4-0.18-0.5 % gel 3 mL (3 mL Topical Given 7/16/24 1708)   lidocaine 1% - EPINEPHrine 1:000263 (XYLOCAINE W/EPI) 1 %-1:956216 injection 20 mL (20 mL Injection Given by Other 7/16/24 4183)         ORDERS PLACED DURING THIS VISIT:  Orders Placed This Encounter    Procedures    CT Head Without Contrast    CT Cervical Spine Without Contrast    CT Head Without Contrast    Comprehensive Metabolic Panel    Protime-INR    aPTT    CBC Auto Differential    Neurosurgery (on-call MD unless specified)    Pulmonology (on-call MD unless specified)    Initiate Observation Status    CBC & Differential         OUTPATIENT MEDICATION MANAGEMENT:  No current Epic-ordered facility-administered medications on file.     Current Outpatient Medications Ordered in Epic   Medication Sig Dispense Refill    clotrimazole-betamethasone (LOTRISONE) 1-0.05 % cream APPLY TOPICALLY TO LEFT BREAST TWICE DAILY 30 g 2    colestipol (COLESTID) 1 g tablet TAKE 1 TABLET BY MOUTH TWICE DAILY 180 tablet 1    desonide (DESOWEN) 0.05 % ointment Apply  topically to the appropriate area as directed 2 (Two) Times a Day. 30 g 1    diphenoxylate-atropine (LOMOTIL) 2.5-0.025 MG per tablet Take 1 tablet by mouth 4 (Four) Times a Day As Needed.      famotidine (PEPCID) 40 MG tablet Take 1 tablet by mouth Daily. 90 tablet 3    loperamide (IMODIUM) 2 MG capsule TAKE 1 CAPSULE BY MOUTH EVERY 6 HOURS AS NEEDED FOR DIARRHEA      losartan (COZAAR) 25 MG tablet TAKE 1 TABLET BY MOUTH DAILY 30 tablet 5         PROCEDURES  Laceration Repair    Date/Time: 7/16/2024 7:51 PM    Performed by: Jeanie Topete PA-C  Authorized by: Darian Ross MD    Consent:     Consent obtained:  Verbal    Consent given by:  Patient    Risks, benefits, and alternatives were discussed: yes      Risks discussed:  Poor cosmetic result and pain    Alternatives discussed:  No treatment  Universal protocol:     Procedure explained and questions answered to patient or proxy's satisfaction: yes      Patient identity confirmed:  Verbally with patient  Anesthesia:     Anesthesia method:  Local infiltration    Local anesthetic:  Lidocaine 1% WITH epi  Laceration details:     Location:  Face    Face location:  L eyebrow    Length (cm):  1  Pre-procedure  details:     Preparation:  Patient was prepped and draped in usual sterile fashion and imaging obtained to evaluate for foreign bodies  Exploration:     Limited defect created (wound extended): no      Hemostasis achieved with:  Epinephrine and direct pressure    Imaging outcome: foreign body not noted      Wound exploration: wound explored through full range of motion and entire depth of wound visualized      Wound extent: no foreign bodies/material noted and no underlying fracture noted      Contaminated: no    Treatment:     Area cleansed with:  Saline    Amount of cleaning:  Standard    Irrigation solution:  Sterile saline    Irrigation volume:  20 ml    Irrigation method:  Syringe    Visualized foreign bodies/material removed: no      Debridement:  None    Undermining:  None    Scar revision: no    Skin repair:     Repair method:  Sutures    Suture size:  5-0    Suture material:  Nylon    Suture technique:  Simple interrupted    Number of sutures:  2  Approximation:     Approximation:  Close  Repair type:     Repair type:  Simple  Post-procedure details:     Dressing:  Bulky dressing    Procedure completion:  Tolerated well, no immediate complications      Two skin tears on the right shin were anesthetized with LET, were approximated, covered with Mepitel dressing, antibiotic ointment, nonadherent dressing, and wrapped in Kerlix.        PROGRESS, DATA ANALYSIS, CONSULTS, AND MEDICAL DECISION MAKING  All labs have been independently interpreted by me.  All radiology studies have been reviewed by me. All EKG's have been independently viewed and interpreted by me.  Discussion below represents my analysis of pertinent findings related to patient's condition, differential diagnosis, treatment plan and final disposition.    Differential diagnosis includes but is not limited to subarachnoid hemorrhage, temporal arthritis, skin laceration, skin tear.          ED Course as of 07/16/24 1948 Tue Jul 16, 2024   2797  Patient presents with fall with closed head injury, significant risk of morbidity and mortality given patient's age, significant risk for occult cervical spine fracture, obtaining CT imaging.  Plan for wound repair. [JG]   1809 I reviewed CT cervical spine imaging in PACS, no fracture per my read. [JG]   1814 I reviewed CT head imaging in PACS, subdural hemorrhage per my read. [JG]   1815 Phone call with radiologist.  I had previously reviewed the images. I discussed the patient, imaging, and their interpretation.  CT head imaging significant for subdural hemorrhage.  See dictated report for final interpretation.     [JG]   1820 Obtain lab work including coagulation factors, consulting neurosurgeon, plan for admission. [JG]   1841 Patient reassessed, patient neuro intact.  Discussed ED workup and results, discussed plan for admission.  Patient has no questions or concerns at present. [JG]   1855 I spoke with Dr. Stern with neurosurgery. Discussed patient presentation and ED findings. He recommends ICU admit with repeat head CT in the morning. [MP]   1914 I spoke with SARANYA Raphael for ICU.  Discussed patient presentation and ED findings.  She agrees admit patient to an ICU bed to the service of Dr. Shepard. [MP]      ED Course User Index  [JG] Darian Ross MD  [MP] Jeanie Topete PA-C             AS OF 19:48 EDT VITALS:    BP - 147/94  HR - 57  TEMP - 98.2 °F (36.8 °C)  O2 SATS - 97%    COMPLEXITY OF CARE  The patient requires admission.      DIAGNOSIS  Final diagnoses:   Subdural hematoma   Laceration of forehead, initial encounter   Skin tear of right lower leg without complication, initial encounter   Fall, initial encounter         DISPOSITION  ED Disposition       ED Disposition   Decision to Admit    Condition   --    Comment   Level of Care: Critical Care [6]   Diagnosis: Subdural hematoma [762172]   Admitting Physician: SAIMA SHEPARD [[id number]]   Attending Physician: SAIMA SHEPARD [[id number]]                   Please note that portions of this document were completed with a voice recognition program.    Note Disclaimer: At [facility], we believe that sharing information builds trust and better relationships. You are receiving this note because you recently visited [facility]. It is possible you will see health information before a provider has talked with you about it. This kind of information can be easy to misunderstand. To help you fully understand what it means for your health, we urge you to discuss this note with your provider.         Jeanie Topete PA-C  07/16/24 1953

## 2024-07-17 ENCOUNTER — APPOINTMENT (OUTPATIENT)
Dept: CT IMAGING | Facility: HOSPITAL | Age: 84
End: 2024-07-17
Payer: MEDICARE

## 2024-07-17 LAB
ANION GAP SERPL CALCULATED.3IONS-SCNC: 10.2 MMOL/L (ref 5–15)
BASOPHILS # BLD AUTO: 0.08 10*3/MM3 (ref 0–0.2)
BASOPHILS NFR BLD AUTO: 1.2 % (ref 0–1.5)
BUN SERPL-MCNC: 6 MG/DL (ref 8–23)
BUN/CREAT SERPL: 9.1 (ref 7–25)
CALCIUM SPEC-SCNC: 8.8 MG/DL (ref 8.6–10.5)
CHLORIDE SERPL-SCNC: 104 MMOL/L (ref 98–107)
CHOLEST SERPL-MCNC: 160 MG/DL (ref 0–200)
CO2 SERPL-SCNC: 28.8 MMOL/L (ref 22–29)
CREAT SERPL-MCNC: 0.66 MG/DL (ref 0.57–1)
DEPRECATED RDW RBC AUTO: 39 FL (ref 37–54)
EGFRCR SERPLBLD CKD-EPI 2021: 85.6 ML/MIN/1.73
EOSINOPHIL # BLD AUTO: 0.2 10*3/MM3 (ref 0–0.4)
EOSINOPHIL NFR BLD AUTO: 2.9 % (ref 0.3–6.2)
ERYTHROCYTE [DISTWIDTH] IN BLOOD BY AUTOMATED COUNT: 12.7 % (ref 12.3–15.4)
GLUCOSE SERPL-MCNC: 99 MG/DL (ref 65–99)
HBA1C MFR BLD: 5 % (ref 4.8–5.6)
HCT VFR BLD AUTO: 40.2 % (ref 34–46.6)
HDLC SERPL-MCNC: 70 MG/DL (ref 40–60)
HGB BLD-MCNC: 13.1 G/DL (ref 12–15.9)
IMM GRANULOCYTES # BLD AUTO: 0.02 10*3/MM3 (ref 0–0.05)
IMM GRANULOCYTES NFR BLD AUTO: 0.3 % (ref 0–0.5)
LDLC SERPL CALC-MCNC: 78 MG/DL (ref 0–100)
LDLC/HDLC SERPL: 1.11 {RATIO}
LYMPHOCYTES # BLD AUTO: 1.51 10*3/MM3 (ref 0.7–3.1)
LYMPHOCYTES NFR BLD AUTO: 21.8 % (ref 19.6–45.3)
MAGNESIUM SERPL-MCNC: 1.6 MG/DL (ref 1.6–2.4)
MCH RBC QN AUTO: 27.8 PG (ref 26.6–33)
MCHC RBC AUTO-ENTMCNC: 32.6 G/DL (ref 31.5–35.7)
MCV RBC AUTO: 85.4 FL (ref 79–97)
MONOCYTES # BLD AUTO: 0.89 10*3/MM3 (ref 0.1–0.9)
MONOCYTES NFR BLD AUTO: 12.8 % (ref 5–12)
NEUTROPHILS NFR BLD AUTO: 4.23 10*3/MM3 (ref 1.7–7)
NEUTROPHILS NFR BLD AUTO: 61 % (ref 42.7–76)
NRBC BLD AUTO-RTO: 0 /100 WBC (ref 0–0.2)
PLATELET # BLD AUTO: 171 10*3/MM3 (ref 140–450)
PMV BLD AUTO: 11.3 FL (ref 6–12)
POTASSIUM SERPL-SCNC: 3.9 MMOL/L (ref 3.5–5.2)
RBC # BLD AUTO: 4.71 10*6/MM3 (ref 3.77–5.28)
SODIUM SERPL-SCNC: 143 MMOL/L (ref 136–145)
TRIGL SERPL-MCNC: 62 MG/DL (ref 0–150)
VLDLC SERPL-MCNC: 12 MG/DL (ref 5–40)
WBC NRBC COR # BLD AUTO: 6.93 10*3/MM3 (ref 3.4–10.8)

## 2024-07-17 PROCEDURE — 85025 COMPLETE CBC W/AUTO DIFF WBC: CPT

## 2024-07-17 PROCEDURE — 80048 BASIC METABOLIC PNL TOTAL CA: CPT

## 2024-07-17 PROCEDURE — G0378 HOSPITAL OBSERVATION PER HR: HCPCS

## 2024-07-17 PROCEDURE — 70450 CT HEAD/BRAIN W/O DYE: CPT

## 2024-07-17 PROCEDURE — 97165 OT EVAL LOW COMPLEX 30 MIN: CPT

## 2024-07-17 PROCEDURE — 83735 ASSAY OF MAGNESIUM: CPT

## 2024-07-17 PROCEDURE — 97161 PT EVAL LOW COMPLEX 20 MIN: CPT

## 2024-07-17 PROCEDURE — 97535 SELF CARE MNGMENT TRAINING: CPT

## 2024-07-17 PROCEDURE — 80061 LIPID PANEL: CPT

## 2024-07-17 PROCEDURE — 83036 HEMOGLOBIN GLYCOSYLATED A1C: CPT

## 2024-07-17 RX ORDER — LOSARTAN POTASSIUM 25 MG/1
25 TABLET ORAL
Status: DISCONTINUED | OUTPATIENT
Start: 2024-07-17 | End: 2024-07-18 | Stop reason: HOSPADM

## 2024-07-17 RX ORDER — OMEPRAZOLE 20 MG/1
20 CAPSULE, DELAYED RELEASE ORAL DAILY
COMMUNITY

## 2024-07-17 RX ADMIN — POTASSIUM CHLORIDE 40 MEQ: 750 TABLET, EXTENDED RELEASE ORAL at 05:11

## 2024-07-17 RX ADMIN — Medication 10 ML: at 21:04

## 2024-07-17 RX ADMIN — Medication 10 ML: at 08:11

## 2024-07-17 RX ADMIN — POTASSIUM CHLORIDE 40 MEQ: 750 TABLET, EXTENDED RELEASE ORAL at 02:09

## 2024-07-17 RX ADMIN — FAMOTIDINE 40 MG: 20 TABLET, FILM COATED ORAL at 08:10

## 2024-07-17 NOTE — PROGRESS NOTES
"  Daily Progress Note.   [facility] INTENSIVE CARE  7/17/2024    Patient:  Name:  Trice Melara  MRN:  [id number]  1940  84 y.o.  female         CC: Mechanical fall subdural hematoma  Summary  Patient is a very nice 84-year-old with a previous medical history of GERD peptic ulcer with bowel perforation tobacco dependence ongoing hypertension who presented to the ER after mechanical fall and subdural hematoma after proceeding to finish some of her errands.    Interval History:  Patient is awake alert conversant.  She is joking with her daughter.  She describes distinctly mechanical fall off of a stool in the shower.  No loss of consciousness in fact she went and did some errands afterwards.  She has no worsening headache no coughing the tip impairment no speech impairment no weakness.  Tolerating a diet no emesis no abdominal pain.    Does have some significant cuts on the right lower extremity with significant ecchymosis.        Physical Exam:  /71   Pulse 93   Temp 98.7 °F (37.1 °C) (Oral)   Resp 18   Ht 149.9 cm (59\")   Wt 52 kg (114 lb 10.2 oz)   SpO2 94%   BMI 23.15 kg/m²   Body mass index is 23.15 kg/m².    Intake/Output Summary (Last 24 hours) at 7/17/2024 1203  Last data filed at 7/17/2024 0900  Gross per 24 hour   Intake 240 ml   Output --   Net 240 ml     General appearance: Nontoxic, conversant   Eyes: anicteric sclerae, moist conjunctivae; no lidlag;    HENT: Ecchymosis abrasion on the left side  f; oropharynx clear with moist mucous membranes    Neck: Trachea midline;  supple   Lungs: CTA, with normal respiratory effort and no intercostal retractions  CV: RRR, no rub positive murmur  Abdomen: Thin bowel sounds positive  Extremities: No peripheral edema changes as below  Skin: WWP 2 large skin tears right lower extremity with ecchymosis around this.  Psych: Appropriate affect, alert   Neuro: Moves all ext. CN II-XII. Speech intact.    Data Review:  Notable Labs:  Results " from last 7 days   Lab Units 07/17/24  0511 07/16/24 1945   WBC 10*3/mm3 6.93 10.48   HEMOGLOBIN g/dL 13.1 14.6   PLATELETS 10*3/mm3 171 201     Results from last 7 days   Lab Units 07/17/24  0511 07/16/24 1945   SODIUM mmol/L 143 140   POTASSIUM mmol/L 3.9 2.8*   CHLORIDE mmol/L 104 99   CO2 mmol/L 28.8 29.0   BUN mg/dL 6* 7*   CREATININE mg/dL 0.66 0.61   GLUCOSE mg/dL 99 118*   CALCIUM mg/dL 8.8 9.6   MAGNESIUM mg/dL 1.6  --    Estimated Creatinine Clearance: 50.2 mL/min (by C-G formula based on SCr of 0.66 mg/dL).    Results from last 7 days   Lab Units 07/17/24 0511 07/16/24 1945   AST (SGOT) U/L  --  37*   ALT (SGPT) U/L  --  20   PLATELETS 10*3/mm3 171 201             Imaging:  Reviewed chest images personally from past 3 days    ASSESSMENT  /  PLAN:  Acute subdural hematoma secondary to mechanical fall  Mechanical fall  Hypertension  GERD  Tobacco abuse -advised to be good for her health if she stopped  Hypokalemia status post repletion.  Moderate to severe mitral valve regurgitation  5 mm right lower lobe pulmonary nodule recommend follow-up with repeat CT scan in a year if patient desires      Neurosurgery consulted  Blood pressure management  Nicardipine as needed.  Restart home losartan if blood pressure requires Cardene  Serial electrolytes repletion as needed    Wound care consult    She did give me permission to discuss her health care with her son  Macho Melara.     All issues new to me today.  Prior hospital course, labs and imaging reviewed.      Electronically signed by Pravin Wong MD, 07/17/24, 12:03 PM EDT.  [facility]

## 2024-07-17 NOTE — THERAPY EVALUATION
Patient Name: Trice Melara  : 1940    MRN: [id number]                              Today's Date: 2024       Admit Date: 2024    Visit Dx:     ICD-10-CM ICD-9-CM   1. Subdural hematoma  S06.5XAA 432.1   2. Laceration of forehead, initial encounter  S01.81XA 873.42   3. Skin tear of right lower leg without complication, initial encounter  S81.811A 891.0   4. Fall, initial encounter  W19.XXXA E888.9     Patient Active Problem List   Diagnosis    Gastroesophageal reflux disease    Abnormal kidney function    Primary osteoarthritis of knees, bilateral    Closed displaced intertrochanteric fracture of right femur    Osteoporosis    Status post right knee replacement    Hypertriglyceridemia    Subdural hematoma     Past Medical History:   Diagnosis Date    Abdominal wall dehiscence 2016    Fascial Dehiscence    Acute renal failure     Aspiration pneumonia     Colovaginal fistula     Diverticulitis     with paracolonic abscess and colovesical fistula,     Frequent loose stools     Gastric ulcer     GERD (gastroesophageal reflux disease)     H/O Bowel perforation     History of diverticular abscess     History of GI bleed     Osteoarthritis     Peritonitis 2016    Septic shock     Small bowel anastomotic leak 2016     Past Surgical History:   Procedure Laterality Date    BREAST CYST EXCISION      COLON RESECTION  2016    Smal Bowel Resection -Dr. Syd Maharaj    COLON SURGERY Left     with closure of fistula    EXPLORATORY LAPAROTOMY  2015    With Extensive Adhesiolysis of over 1 hour to allow conduct of the operation and repair of small intestine-Dr. Syd Maharaj    EXPLORATORY LAPAROTOMY  2016    Dr. Syd Maharaj    INCISIONAL HERNIA REPAIR  2016    Incisional Hernia Repair with Fascial Dehiscence Repair with Mesh and Bilateral Component Separation-Dr. Syd Maharaj    MN TX INTER/MN/SUBTRCHNTRIC FEM FX IMED IMPLTSCREW Right  7/24/2017    Procedure: FEMUR INTRAMEDULLARY NAILING/RODDING;  Surgeon: Huan Weiss MD;  Location: [facility] MAIN OR;  Service: Orthopedics    TOTAL ABDOMINAL HYSTERECTOMY  1970s    TOTAL KNEE ARTHROPLASTY Right 4/29/2019    Procedure: RT TOTAL KNEE ARTHROPLASTY WITH NAVIGATION;  Surgeon: Bolivar Glover MD;  Location: [facility] MAIN OR;  Service: Orthopedics    TOTAL KNEE ARTHROPLASTY Left 9/11/2019    Procedure: TOTAL KNEE ARTHROPLASTY;  Surgeon: Bolivar Glover MD;  Location: [facility] MAIN OR;  Service: Orthopedics      General Information       [facility] Name 07/17/24 1652          Physical Therapy Time and Intention    Document Type evaluation  -     Mode of Treatment individual therapy;physical therapy  -       Row Name 07/17/24 1652          General Information    Patient Profile Reviewed yes  -     Prior Level of Function independent:;gait;transfer;bed mobility  -     Existing Precautions/Restrictions fall  -     Barriers to Rehab none identified  -[facility] Name 07/17/24 1652          Living Environment    People in Home alone  -[facility] Name 07/17/24 1652          Home Main Entrance    Number of Stairs, Main Entrance four  -[facility] Name 07/17/24 1652          Stairs Within Home, Primary    Stairs, Within Home, Primary Chair lift  -     Number of Stairs, Within Home, Primary none  -       Row Name 07/17/24 1652          Cognition    Orientation Status (Cognition) oriented x 4  -[facility] Name 07/17/24 1652          Safety Issues, Functional Mobility    Impairments Affecting Function (Mobility) endurance/activity tolerance;strength  -               User Key  (r) = Recorded By, (t) = Taken By, (c) = Cosigned By      Initials Name Provider Type     Tran Brown PT Physical Therapist                   Mobility       Row Name 07/17/24 1652          Bed Mobility    Bed Mobility supine-sit  -     Supine-Sit [place] (Bed Mobility) standby assist  -     Sit-Supine [place]  (Bed Mobility) not tested  NT - [facility]IC  -[facility] Name 07/17/24 1652          Sit-Stand Transfer    Sit-Stand [place] (Transfers) standby assist;verbal cues  -BH       Row Name 07/17/24 1652          Gait/Stairs (Locomotion)    [place] Level (Gait) standby assist;verbal cues  -     Distance in Feet (Gait) 250  -     Deviations/Abnormal Patterns (Gait) gladis decreased;gait speed decreased  -               User Key  (r) = Recorded By, (t) = Taken By, (c) = Cosigned By      Initials Name Provider Type     Tran Brown PT Physical Therapist                   Obj/Interventions       [facility] Name 07/17/24 1653          Range of Motion Comprehensive    General Range of Motion bilateral lower extremity ROM WFL  -BH       Row Name 07/17/24 1653          Strength Comprehensive (MMT)    General Manual Muscle Testing (MMT) Assessment no strength deficits identified  -     Comment, General Manual Muscle Testing (MMT) Assessment BLE WFL  -BH       Row Name 07/17/24 1653          Balance    Balance Assessment sitting static balance;sitting dynamic balance;standing static balance;standing dynamic balance  -     Static Sitting Balance supervision  -     Dynamic Sitting Balance standby assist  -     Position, Sitting Balance unsupported;sitting edge of bed  -     Static Standing Balance standby assist  -     Dynamic Standing Balance standby assist  -     Position/Device Used, Standing Balance unsupported  -     Balance Interventions sitting;standing;sit to stand;dynamic;static  -BH       Row Name 07/17/24 1653          Sensory Assessment (Somatosensory)    Sensory Assessment (Somatosensory) LE sensation intact  -               User Key  (r) = Recorded By, (t) = Taken By, (c) = Cosigned By      Initials Name Provider Type     Tran Brown PT Physical Therapist                   Goals/Plan    No documentation.                  Clinical Impression       [facility] Name 07/17/24 1654          Pain     Pretreatment Pain Rating 0/10 - no pain  -     Posttreatment Pain Rating 0/10 - no pain  -       Row Name 07/17/24 1654          Plan of Care Review    Plan of Care Reviewed With patient;daughter  -     Outcome Evaluation Pt is an 85 yo F admitted from home after a fall - work-up revealed SDH. Pt lives alone and reports independence at BL with no AD, but has a cane, walker, and WC if needed. Pt denies other falls hx, reports 3 DEMETRICE and a chair lift inside. Pt presents to PT with minimal functional deficits, appearing to be at her BL. Pt transferred to EOB with SBA, STS with SBA, and ambulated 250ft with SBA and no AD. Pt demo's overall steadiness and independence with gait, no overt LOB or safety concerns. Pt returned to room and transport arrived to take pt to 5N - left sitting in  transport chair. Pt denies any further acute PT needs, encouraged continued ambulation with nsg and calling out to walk to the bathroom upstairs. Pt safe to DC home with family assist as needed, PT will sign-off.  -       Row Name 07/17/24 1654          Therapy Assessment/Plan (PT)    Criteria for Skilled Interventions Met (PT) no problems identified which require skilled intervention;no  -     Therapy Frequency (PT) evaluation only  -       Row Name 07/17/24 1654          Vital Signs    O2 Delivery Pre Treatment room air  -     O2 Delivery Intra Treatment room air  -     O2 Delivery Post Treatment room air  -       Row Name 07/17/24 1654          Positioning and Restraints    Pre-Treatment Position in bed  -     Post Treatment Position wheelchair  -     In Wheelchair sitting;with family/caregiver;with other staff  Transport chair  -               User Key  (r) = Recorded By, (t) = Taken By, (c) = Cosigned By      Initials Name Provider Type     Tran Brown, PT Physical Therapist                   Outcome Measures       Row Name 07/17/24 1704 07/17/24 0800       How much help from another person do you  currently need...    Turning from your back to your side while in flat bed without using bedrails? 4  - 4  -HD (r) EM (t) HD (c)    Moving from lying on back to sitting on the side of a flat bed without bedrails? 4  - 4  -HD (r) EM (t) HD (c)    Moving to and from a bed to a chair (including a wheelchair)? 4  - 4  -HD (r) EM (t) HD (c)    Standing up from a chair using your arms (e.g., wheelchair, bedside chair)? 4  - 4  -HD (r) EM (t) HD (c)    Climbing 3-5 steps with a railing? 4  - 4  -HD (r) EM (t) HD (c)    To walk in hospital room? 4  - 4  -HD (r) EM (t) HD (c)    AM-PAC 6 Clicks Score (PT) 24  - 24  -HD (r) EM (t)    Highest Level of Mobility Goal 8 --> Walked 250 feet or more  - 8 --> Walked 250 feet or more  -HD (r) EM (t)      Row Name 07/17/24 1240          Modified [place] Scale    Modified [place] Scale 2 - Slight disability.  Unable to carry out all previous activities but able to look after own affairs without assistance.  -JW (r) WING (t) GUSTAVO (c)       Row Name 07/17/24 1704 07/17/24 1240       Functional Assessment    Outcome Measure Options AM-PAC 6 Clicks Basic Mobility (PT)  - AM-PAC 6 Clicks Daily Activity (OT);Modified [place]  -JW (r) MF (t) JW (c)              User Key  (r) = Recorded By, (t) = Taken By, (c) = Cosigned By      Initials Name Provider Type     Lauren Joaquin, RN Registered Nurse     Tran Brown, PT Physical Therapist    Maia Leggett, OT Occupational Therapist    Raven Lomeli, [place] [place]    Aleyda Samayoa, OT Student OT Student                                 Physical Therapy Education       Title: PT OT SLP Therapies (In Progress)       Topic: Physical Therapy (In Progress)       Point: Mobility training (Done)       Learning Progress Summary             Patient Acceptance, E,TB,D, VU by  at 7/17/2024 1704                         Point: Home exercise program (Not Started)       Learner Progress:  Not documented in this visit.               Point: Body mechanics (Done)       Learning Progress Summary             Patient Acceptance, E,TB,D, VU by  at 7/17/2024 1704                         Point: Precautions (Done)       Learning Progress Summary             Patient Acceptance, E,TB,D, VU by  at 7/17/2024 1704                                         User Key       Initials Effective Dates Name Provider Type Discipline     04/08/22 -  Tran Brown, PT Physical Therapist PT                  PT Recommendation and Plan     Plan of Care Reviewed With: patient, daughter  Outcome Evaluation: Pt is an 85 yo F admitted from home after a fall - work-up revealed SDH. Pt lives alone and reports independence at BL with no AD, but has a cane, walker, and WC if needed. Pt denies other falls hx, reports 3 DEMETRICE and a chair lift inside. Pt presents to PT with minimal functional deficits, appearing to be at her BL. Pt transferred to EOB with SBA, STS with SBA, and ambulated 250ft with SBA and no AD. Pt demo's overall steadiness and independence with gait, no overt LOB or safety concerns. Pt returned to room and transport arrived to take pt to 5N - left sitting in  transport chair. Pt denies any further acute PT needs, encouraged continued ambulation with nsg and calling out to walk to the bathroom upstairs. Pt safe to DC home with family assist as needed, PT will sign-off.     Time Calculation:   PT Evaluation Complexity  History, PT Evaluation Complexity: 1-2 personal factors and/or comorbidities  Examination of Body Systems (PT Eval Complexity): total of 3 or more elements  Clinical Presentation (PT Evaluation Complexity): stable  Clinical Decision Making (PT Evaluation Complexity): low complexity  Overall Complexity (PT Evaluation Complexity): low complexity     PT Charges       Row Name 07/17/24 1705             Time Calculation    Start Time 1446  -      Stop Time 1454  -      Time Calculation (min) 8 min  -      PT Received On 07/17/24  -                 User Key  (r) = Recorded By, (t) = Taken By, (c) = Cosigned By      Initials Name Provider Type     Tran Brown, PT Physical Therapist                  Therapy Charges for Today       Code Description Service Date Service Provider Modifiers Qty    22966966330 HC PT EVAL LOW COMPLEXITY 2 7/17/2024 Tran Brown PT GP 1            PT G-Codes  Outcome Measure Options: AM-PAC 6 Clicks Basic Mobility (PT)  AM-PAC 6 Clicks Score (PT): 24  AM-PAC 6 Clicks Score (OT): 22  Modified [place] Scale: 2 - Slight disability.  Unable to carry out all previous activities but able to look after own affairs without assistance.  PT Discharge Summary  Anticipated Discharge Disposition (PT): home with assist    Tran Brown PT  7/17/2024

## 2024-07-17 NOTE — CONSULTS
[facility] NEUROSURGERY CONSULT NOTE    Patient name: Trice Melara  Referring Provider: Dr Ross  Reason for Consultation: SDH    Patient Care Team:  Maia Whitney MD as PCP - General (Internal Medicine)  Rey Madrid MD as Consulting Physician (Hematology and Oncology)  Bolivar Glover MD as Surgeon (Orthopedic Surgery)    Chief complaint: Fall, head injury    Subjective .     History of present illness:    Patient is a 84 y.o.  female with past medical history of GERD, HTN.  Patient presented after a slip and fall in the shower yesterday resulting in a subdural hematoma.  Patient reports she was getting out of the shower when she went to step up on a stool that came out from underneath her causing her to fall forward and strike her head.  She has a laceration over her left eyebrow.  She denies LOC, nausea, vomiting, headache, dizziness or lightheadedness.  She states she was able to get herself up, drove herself to the bank and then drove herself to the emergency room.  She is not anticoagulated.  She does live alone.      History  PAST MEDICAL HISTORY  Past Medical History:   Diagnosis Date    Abdominal wall dehiscence 01/08/2016    Fascial Dehiscence    Acute renal failure     Aspiration pneumonia     Colovaginal fistula 2004    Diverticulitis 2010    with paracolonic abscess and colovesical fistula, 2004    Frequent loose stools     Gastric ulcer     GERD (gastroesophageal reflux disease)     H/O Bowel perforation 2004    History of diverticular abscess     History of GI bleed 2010    Osteoarthritis     Peritonitis 01/2016    Septic shock     Small bowel anastomotic leak 01/08/2016       PAST SURGICAL HISTORY  Past Surgical History:   Procedure Laterality Date    BREAST CYST EXCISION      COLON RESECTION  01/08/2016    Smal Bowel Resection -Dr. Syd Maharaj    COLON SURGERY Left 2010    with closure of fistula    EXPLORATORY LAPAROTOMY  12/31/2015    With Extensive Adhesiolysis of over 1 hour to allow  conduct of the operation and repair of small intestine-Dr. Syd Maharaj    EXPLORATORY LAPAROTOMY  01/08/2016    Dr. Syd Maharaj    INCISIONAL HERNIA REPAIR  01/08/2016    Incisional Hernia Repair with Fascial Dehiscence Repair with Mesh and Bilateral Component Separation-Dr. Syd Maharaj    NV TX INTER/NV/SUBTRCHNTRIC FEM FX IMED IMPLTSCREW Right 7/24/2017    Procedure: FEMUR INTRAMEDULLARY NAILING/RODDING;  Surgeon: Huan Weiss MD;  Location: [facility] MAIN OR;  Service: Orthopedics    TOTAL ABDOMINAL HYSTERECTOMY  1970s    TOTAL KNEE ARTHROPLASTY Right 4/29/2019    Procedure: RT TOTAL KNEE ARTHROPLASTY WITH NAVIGATION;  Surgeon: Bolivar Glover MD;  Location: [facility] MAIN OR;  Service: Orthopedics    TOTAL KNEE ARTHROPLASTY Left 9/11/2019    Procedure: TOTAL KNEE ARTHROPLASTY;  Surgeon: Bolivar Glover MD;  Location: [facility] MAIN OR;  Service: Orthopedics       FAMILY HISTORY  Family History   Problem Relation Age of Onset    Dementia Mother     Thyroid disease Mother     Leukemia Father     Thyroid disease Brother     Malig Hyperthermia Neg Hx        SOCIAL HISTORY  Social History     Tobacco Use    Smoking status: Some Days     Current packs/day: 0.25     Average packs/day: 0.3 packs/day for 50.0 years (12.5 ttl pk-yrs)     Types: Cigarettes    Smokeless tobacco: Never    Tobacco comments:     5-6 CIG DAY   Vaping Use    Vaping status: Never Used   Substance Use Topics    Alcohol use: Not Currently     Comment: 1-2 per evening    Drug use: No       retired    Allergies:  Patient has no known allergies.    MEDICATIONS:  Medications Prior to Admission   Medication Sig Dispense Refill Last Dose    clotrimazole-betamethasone (LOTRISONE) 1-0.05 % cream APPLY TOPICALLY TO LEFT BREAST TWICE DAILY 30 g 2     colestipol (COLESTID) 1 g tablet TAKE 1 TABLET BY MOUTH TWICE DAILY 180 tablet 1     desonide (DESOWEN) 0.05 % ointment Apply  topically to the appropriate area as directed 2 (Two) Times a Day. 30 g  1     diphenoxylate-atropine (LOMOTIL) 2.5-0.025 MG per tablet Take 1 tablet by mouth 4 (Four) Times a Day As Needed.       famotidine (PEPCID) 40 MG tablet Take 1 tablet by mouth Daily. 90 tablet 3     loperamide (IMODIUM) 2 MG capsule TAKE 1 CAPSULE BY MOUTH EVERY 6 HOURS AS NEEDED FOR DIARRHEA       losartan (COZAAR) 25 MG tablet TAKE 1 TABLET BY MOUTH DAILY 30 tablet 5        Current Facility-Administered Medications:     acetaminophen (TYLENOL) tablet 650 mg, 650 mg, Oral, Q4H PRN **OR** acetaminophen (TYLENOL) suppository 650 mg, 650 mg, Rectal, Q4H PRN, Felicia Le APRN    sennosides-docusate (PERICOLACE) 8.6-50 MG per tablet 2 tablet, 2 tablet, Oral, BID **AND** polyethylene glycol (MIRALAX) packet 17 g, 17 g, Oral, Daily PRN **AND** bisacodyl (DULCOLAX) EC tablet 5 mg, 5 mg, Oral, Daily PRN **AND** bisacodyl (DULCOLAX) suppository 10 mg, 10 mg, Rectal, Daily PRN, Felicia Le APRN    Calcium Replacement - Follow Nurse / BPA Driven Protocol, , Does not apply, PRN, Felicia Le APRN    famotidine (PEPCID) tablet 40 mg, 40 mg, Oral, Daily, Felicia Le APRN, 40 mg at 07/17/24 0810    labetalol (NORMODYNE,TRANDATE) injection 20 mg, 20 mg, Intravenous, Q2H PRN, Felicia Le APRN    losartan (COZAAR) tablet 25 mg, 25 mg, Oral, Q24H, Pravin Wong MD    Magnesium Standard Dose Replacement - Follow Nurse / BPA Driven Protocol, , Does not apply, PRN, Felicia Le APRN    nitroglycerin (NITROSTAT) SL tablet 0.4 mg, 0.4 mg, Sublingual, Q5 Min PRN, Felicia Le APRN    ondansetron (ZOFRAN) injection 4 mg, 4 mg, Intravenous, Q6H PRN, Felicia Le APRN    Phosphorus Replacement - Follow Nurse / BPA Driven Protocol, , Does not apply, Rey DYSON Kelley J, APRN    Potassium Replacement - Follow Nurse / BPA Driven Protocol, , Does not apply, Rey DYSON Kelley J, SARANYA    sodium chloride 0.9 % flush 10 mL, 10 mL, Intravenous, Q12H, Felicia Le APRN, 10 mL at 07/17/24 0811     sodium chloride 0.9 % flush 10 mL, 10 mL, Intravenous, PRN, Felicia Le, APRN    sodium chloride 0.9 % infusion 40 mL, 40 mL, Intravenous, PRN, Felicia Le, APRN        COMORBID CONDITIONS:  Hypertension      Review of Systems  Review of Systems   Gastrointestinal:  Negative for nausea and vomiting.   Musculoskeletal:  Negative for back pain and neck pain.   Neurological:  Negative for dizziness, syncope, weakness, light-headedness and headaches.   Psychiatric/Behavioral:  The patient is not nervous/anxious.      Objective     Physical Exam  Physical Exam  Vitals reviewed.   Constitutional:       Appearance: Normal appearance.   Pulmonary:      Effort: Pulmonary effort is normal.   Skin:     General: Skin is warm and dry.      Findings: Laceration (above left eyebrow) present.   Neurological:      Mental Status: She is alert and oriented to person, place, and time.      Cranial Nerves: Cranial nerves 2-12 are intact.      Motor: Motor strength is normal.  Psychiatric:         Speech: Speech normal.       Neurologic Exam     Mental Status   Oriented to person, place, and time.   Speech: speech is normal   Level of consciousness: alert  Knowledge: good.     Cranial Nerves   Cranial nerves II through XII intact.     Motor Exam   Muscle bulk: normal  Overall muscle tone: normal    Strength   Strength 5/5 throughout.     Sensory Exam   Light touch normal.     Gait, Coordination, and Reflexes   Gait not tested d/t fall risk         Results Review:    LABS:    Admission on 07/16/2024   Component Date Value Ref Range Status    Glucose 07/16/2024 118 (H)  65 - 99 mg/dL Final    BUN 07/16/2024 7 (L)  8 - 23 mg/dL Final    Creatinine 07/16/2024 0.61  0.57 - 1.00 mg/dL Final    Sodium 07/16/2024 140  136 - 145 mmol/L Final    Potassium 07/16/2024 2.8 (L)  3.5 - 5.2 mmol/L Final    Chloride 07/16/2024 99  98 - 107 mmol/L Final    CO2 07/16/2024 29.0  22.0 - 29.0 mmol/L Final    Calcium 07/16/2024 9.6  8.6 - 10.5 mg/dL  Final    Total Protein 07/16/2024 7.0  6.0 - 8.5 g/dL Final    Albumin 07/16/2024 4.3  3.5 - 5.2 g/dL Final    ALT (SGPT) 07/16/2024 20  1 - 33 U/L Final    AST (SGOT) 07/16/2024 37 (H)  1 - 32 U/L Final    Alkaline Phosphatase 07/16/2024 71  39 - 117 U/L Final    Total Bilirubin 07/16/2024 0.7  0.0 - 1.2 mg/dL Final    Globulin 07/16/2024 2.7  gm/dL Final    A/G Ratio 07/16/2024 1.6  g/dL Final    BUN/Creatinine Ratio 07/16/2024 11.5  7.0 - 25.0 Final    Anion Gap 07/16/2024 12.0  5.0 - 15.0 mmol/L Final    eGFR 07/16/2024 87.2  >60.0 mL/min/1.73 Final    Protime 07/16/2024 13.0  11.7 - 14.2 Seconds Final    INR 07/16/2024 0.97  0.90 - 1.10 Final    PTT 07/16/2024 26.0  22.7 - 35.4 seconds Final    WBC 07/16/2024 10.48  3.40 - 10.80 10*3/mm3 Final    RBC 07/16/2024 5.16  3.77 - 5.28 10*6/mm3 Final    Hemoglobin 07/16/2024 14.6  12.0 - 15.9 g/dL Final    Hematocrit 07/16/2024 43.3  34.0 - 46.6 % Final    MCV 07/16/2024 83.9  79.0 - 97.0 fL Final    MCH 07/16/2024 28.3  26.6 - 33.0 pg Final    MCHC 07/16/2024 33.7  31.5 - 35.7 g/dL Final    RDW 07/16/2024 12.3  12.3 - 15.4 % Final    RDW-SD 07/16/2024 37.3  37.0 - 54.0 fl Final    MPV 07/16/2024 11.0  6.0 - 12.0 fL Final    Platelets 07/16/2024 201  140 - 450 10*3/mm3 Final    Neutrophil % 07/16/2024 70.4  42.7 - 76.0 % Final    Lymphocyte % 07/16/2024 16.4 (L)  19.6 - 45.3 % Final    Monocyte % 07/16/2024 11.2  5.0 - 12.0 % Final    Eosinophil % 07/16/2024 1.0  0.3 - 6.2 % Final    Basophil % 07/16/2024 0.7  0.0 - 1.5 % Final    Immature Grans % 07/16/2024 0.3  0.0 - 0.5 % Final    Neutrophils, Absolute 07/16/2024 7.39 (H)  1.70 - 7.00 10*3/mm3 Final    Lymphocytes, Absolute 07/16/2024 1.72  0.70 - 3.10 10*3/mm3 Final    Monocytes, Absolute 07/16/2024 1.17 (H)  0.10 - 0.90 10*3/mm3 Final    Eosinophils, Absolute 07/16/2024 0.10  0.00 - 0.40 10*3/mm3 Final    Basophils, Absolute 07/16/2024 0.07  0.00 - 0.20 10*3/mm3 Final    Immature Grans, Absolute 07/16/2024 0.03   0.00 - 0.05 10*3/mm3 Final    nRBC 07/16/2024 0.0  0.0 - 0.2 /100 WBC Final    Glucose 07/16/2024 127  70 - 130 mg/dL Final    Hemoglobin A1C 07/17/2024 5.00  4.80 - 5.60 % Final    Total Cholesterol 07/17/2024 160  0 - 200 mg/dL Final    Triglycerides 07/17/2024 62  0 - 150 mg/dL Final    HDL Cholesterol 07/17/2024 70 (H)  40 - 60 mg/dL Final    LDL Cholesterol  07/17/2024 78  0 - 100 mg/dL Final    VLDL Cholesterol 07/17/2024 12  5 - 40 mg/dL Final    LDL/HDL Ratio 07/17/2024 1.11   Final    Glucose 07/17/2024 99  65 - 99 mg/dL Final    BUN 07/17/2024 6 (L)  8 - 23 mg/dL Final    Creatinine 07/17/2024 0.66  0.57 - 1.00 mg/dL Final    Sodium 07/17/2024 143  136 - 145 mmol/L Final    Potassium 07/17/2024 3.9  3.5 - 5.2 mmol/L Final    Slight hemolysis detected by analyzer. Result may be falsely elevated.    Chloride 07/17/2024 104  98 - 107 mmol/L Final    CO2 07/17/2024 28.8  22.0 - 29.0 mmol/L Final    Calcium 07/17/2024 8.8  8.6 - 10.5 mg/dL Final    BUN/Creatinine Ratio 07/17/2024 9.1  7.0 - 25.0 Final    Anion Gap 07/17/2024 10.2  5.0 - 15.0 mmol/L Final    eGFR 07/17/2024 85.6  >60.0 mL/min/1.73 Final    Magnesium 07/17/2024 1.6  1.6 - 2.4 mg/dL Final    WBC 07/17/2024 6.93  3.40 - 10.80 10*3/mm3 Final    RBC 07/17/2024 4.71  3.77 - 5.28 10*6/mm3 Final    Hemoglobin 07/17/2024 13.1  12.0 - 15.9 g/dL Final    Hematocrit 07/17/2024 40.2  34.0 - 46.6 % Final    MCV 07/17/2024 85.4  79.0 - 97.0 fL Final    MCH 07/17/2024 27.8  26.6 - 33.0 pg Final    MCHC 07/17/2024 32.6  31.5 - 35.7 g/dL Final    RDW 07/17/2024 12.7  12.3 - 15.4 % Final    RDW-SD 07/17/2024 39.0  37.0 - 54.0 fl Final    MPV 07/17/2024 11.3  6.0 - 12.0 fL Final    Platelets 07/17/2024 171  140 - 450 10*3/mm3 Final    Neutrophil % 07/17/2024 61.0  42.7 - 76.0 % Final    Lymphocyte % 07/17/2024 21.8  19.6 - 45.3 % Final    Monocyte % 07/17/2024 12.8 (H)  5.0 - 12.0 % Final    Eosinophil % 07/17/2024 2.9  0.3 - 6.2 % Final    Basophil % 07/17/2024  1.2  0.0 - 1.5 % Final    Immature Grans % 07/17/2024 0.3  0.0 - 0.5 % Final    Neutrophils, Absolute 07/17/2024 4.23  1.70 - 7.00 10*3/mm3 Final    Lymphocytes, Absolute 07/17/2024 1.51  0.70 - 3.10 10*3/mm3 Final    Monocytes, Absolute 07/17/2024 0.89  0.10 - 0.90 10*3/mm3 Final    Eosinophils, Absolute 07/17/2024 0.20  0.00 - 0.40 10*3/mm3 Final    Basophils, Absolute 07/17/2024 0.08  0.00 - 0.20 10*3/mm3 Final    Immature Grans, Absolute 07/17/2024 0.02  0.00 - 0.05 10*3/mm3 Final    nRBC 07/17/2024 0.0  0.0 - 0.2 /100 WBC Final    Glucose 07/16/2024 109  70 - 130 mg/dL Final       DIAGNOSTICS:  CT head: When compared to CT head performed 7/16 there is been no change in the subdural fluid collection in the left frontal lobe.  There is no new areas of hemorrhage.    Results Review:   I reviewed the patient's new clinical results.  I personally viewed  the patient's CT head, it was also discussed with and reviewed by Dr. Stern    Vital Signs   Temp:  [98 °F (36.7 °C)-98.7 °F (37.1 °C)] 98.7 °F (37.1 °C)  Heart Rate:  [] 92  Resp:  [16-18] 18  BP: (107-147)/() 128/74      Assessment & Plan       Subdural hematoma      Problem List Items Addressed This Visit          Neuro    * (Principal) Subdural hematoma - Primary     Other Visit Diagnoses       Laceration of forehead, initial encounter        Skin tear of right lower leg without complication, initial encounter        Relevant Medications    Lido-EPINEPHrine-Tetracaine 4-0.18-0.5 % gel 3 mL (Completed)    Fall, initial encounter               84-year-old female found to have a subdural hematoma after a slip and fall at home yesterday.  Patient is not anticoagulated.  She is neurologically intact with no complaints today.  She can be transferred out of the ICU, we will repeat a CT head tomorrow.    PLAN:   -CT head 7/18 a.m.  -Okay to transfer to floor  -Medical management per admitting service    I discussed the patient's findings and my  "recommendations with patient, family, nursing staff, and Dr Stern    During patient visit, I utilized appropriate personal protective equipment including gloves and mask.  Mask used was standard procedure mask. Appropriate PPE was worn during the entire visit.  Hand hygiene was completed before and after.     Angle Deleon, APRN  07/17/24  10:41 EDT    \"Dictated utilizing Dragon dictation\".      "

## 2024-07-17 NOTE — PLAN OF CARE
Goal Outcome Evaluation:  Plan of Care Reviewed With: patient, daughter        Progress: no change  Outcome Evaluation: Pt is an 84 year old F with hx of osteoporosis and tobacco dependency admitted with subdural hematoma from fall at home. Pt lives alone and is (I) with ADLs and functional mobility using no AD. Pt reports she has a walker and cane at home if needed. Pt also has 4 DEMETRICE and electric chair for flight of steps. Pt reports no prior hx of falls. Today, pt demo'd SBA bed mobility and CGA STS transfer and functional mobility. Pt ambulated appropriate household distance to  where she completed CGA toilet transfer and toileting skills with SBA. Pt also completed g/h task standing sinkside. Overall, pt presents at or around her baseline and has no safety concerns returning home. No further acute OT needs at this time, anticipate d/c home with assist from daughter. OT will sign off      Anticipated Discharge Disposition (OT): home with assist

## 2024-07-17 NOTE — THERAPY EVALUATION
Patient Name: Trice Melara  : 1940    MRN: [id number]                              Today's Date: 2024       Admit Date: 2024    Visit Dx:     ICD-10-CM ICD-9-CM   1. Subdural hematoma  S06.5XAA 432.1   2. Laceration of forehead, initial encounter  S01.81XA 873.42   3. Skin tear of right lower leg without complication, initial encounter  S81.811A 891.0   4. Fall, initial encounter  W19.XXXA E888.9     Patient Active Problem List   Diagnosis    Gastroesophageal reflux disease    Abnormal kidney function    Primary osteoarthritis of knees, bilateral    Closed displaced intertrochanteric fracture of right femur    Osteoporosis    Status post right knee replacement    Hypertriglyceridemia    Subdural hematoma     Past Medical History:   Diagnosis Date    Abdominal wall dehiscence 2016    Fascial Dehiscence    Acute renal failure     Aspiration pneumonia     Colovaginal fistula     Diverticulitis     with paracolonic abscess and colovesical fistula,     Frequent loose stools     Gastric ulcer     GERD (gastroesophageal reflux disease)     H/O Bowel perforation     History of diverticular abscess     History of GI bleed     Osteoarthritis     Peritonitis 2016    Septic shock     Small bowel anastomotic leak 2016     Past Surgical History:   Procedure Laterality Date    BREAST CYST EXCISION      COLON RESECTION  2016    Smal Bowel Resection -Dr. Syd Maharaj    COLON SURGERY Left     with closure of fistula    EXPLORATORY LAPAROTOMY  2015    With Extensive Adhesiolysis of over 1 hour to allow conduct of the operation and repair of small intestine-Dr. Syd Maharaj    EXPLORATORY LAPAROTOMY  2016    Dr. Syd Maharaj    INCISIONAL HERNIA REPAIR  2016    Incisional Hernia Repair with Fascial Dehiscence Repair with Mesh and Bilateral Component Separation-Dr. Syd Maharaj    WI TX INTER/WI/SUBTRCHNTRIC FEM FX IMED IMPLTSCREW Right  7/24/2017    Procedure: FEMUR INTRAMEDULLARY NAILING/RODDING;  Surgeon: Huan Weiss MD;  Location: [facility] MAIN OR;  Service: Orthopedics    TOTAL ABDOMINAL HYSTERECTOMY  1970s    TOTAL KNEE ARTHROPLASTY Right 4/29/2019    Procedure: RT TOTAL KNEE ARTHROPLASTY WITH NAVIGATION;  Surgeon: Bolivar Glover MD;  [facility] MAIN [facility];  Service: Orthopedics    TOTAL KNEE ARTHROPLASTY Left 9/11/2019    Procedure: TOTAL KNEE ARTHROPLASTY;  Surgeon: Bolivar Glover MD;  Location: [facility] MAIN OR;  Service: Orthopedics      General Information       Row Name 07/17/24 1224          OT Time and Intention    Document Type evaluation  -JW (r) MF (t) JW (c)     Mode of Treatment individual therapy;occupational therapy  -JW (r) MF (t) JW (c)       Row Name 07/17/24 1224          General Information    Patient Profile Reviewed yes  -JW (r) MF (t) JW (c)     Prior Level of Function independent:  -JW (r) MF (t) JW (c)     Existing Precautions/Restrictions fall  -JW (r) MF (t) JW (c)     Barriers to Rehab none identified  -JW (r) MF (t) JW (c)       Row Name 07/17/24 1224          Living Environment    People in Home alone  -JW (r) MF (t) JW (c)       Row Name 07/17/24 1224          Home Main Entrance    Number of Stairs, Main Entrance four  -JW (r) MF (t) JW (c)       Row Name 07/17/24 1224          Stairs Within Home, Primary    Stairs, Within Home, Primary has electric chair for flight of stairs  -JW (r) MF (t) JW (c)     Number of Stairs, Within Home, Primary other (see comments)  -JW (r) MF (t) JW (c)       Row Name 07/17/24 1224          Cognition    Orientation Status (Cognition) oriented x 4  -JW (r) MF (t) JW (c)       Row Name 07/17/24 1224          Safety Issues, Functional Mobility    Safety Issues Affecting Function (Mobility) awareness of need for assistance;insight into deficits/self-awareness  -JW (r) MF (t) JW (c)     Impairments Affecting Function (Mobility) endurance/activity tolerance;strength  -JW (r) MF (t) JW  (c)               User Key  (r) = Recorded By, (t) = Taken By, (c) = Cosigned By      Initials Name Provider Type    Maia Leggett, OT Occupational Therapist    Aleyda Samayoa OT Student OT Student                     Mobility/ADL's       Row Name 07/17/24 1225          Bed Mobility    Bed Mobility supine-sit;sit-supine  -JW (r) MF (t) JW (c)     Supine-Sit [place] (Bed Mobility) standby assist;1 person assist  -JW (r) MF (t) JW (c)     Sit-Supine [place] (Bed Mobility) standby assist;1 person assist  -JW (r) MF (t) JW (c)     Assistive Device (Bed Mobility) bed rails;head of bed elevated  -JW (r) MF (t) JW (c)       Row Name 07/17/24 1225          Transfers    Transfers sit-stand transfer;stand-sit transfer;toilet transfer  -JW (r) MF (t) JW (c)       Row Name 07/17/24 1225          Sit-Stand Transfer    Sit-Stand [place] (Transfers) contact guard;1 person assist  -JW (r) MF (t) JW (c)     Comment, (Sit-Stand Transfer) x1 from EOB; no AD  -JW (r) MF (t) JW (c)       Row Name 07/17/24 1225          Stand-Sit Transfer    Stand-Sit [place] (Transfers) contact guard;1 person assist  -JW (r) MF (t) JW (c)       Row Name 07/17/24 1225          Toilet Transfer    Type (Toilet Transfer) sit-stand;stand-sit  -JW (r) MF (t) JW (c)     [place] Level (Toilet Transfer) contact guard;1 person assist  -JW (r) MF (t) JW (c)     Assistive Device (Toilet Transfer) commode;grab bars/safety frame  -JW (r) MF (t) JW (c)       Row Name 07/17/24 1225          Functional Mobility    Functional Mobility- Ind. Level contact guard assist  -JW (r) MF (t) JW (c)     Functional Mobility- Comment ambulated appropriate household distance to BR  -JW (r) MF (t) JW (c)     Patient was able to Ambulate yes  -JW (r) MF (t) JW (c)       Row Name 07/17/24 1225          Activities of Daily Living    BADL Assessment/Intervention toileting;grooming  -GUSTAVO (r) WING (t) GUSTAVO (c)       Row Name 07/17/24 1225          Toileting  Assessment/Training    [place] Level (Toileting) toileting skills;standby assist  -JW (r) MF (t) JW (c)     Assistive Devices (Toileting) commode  -JW (r) MF (t) JW (c)       Row Name 07/17/24 1225          Grooming Assessment/Training    [place] Level (Grooming) grooming skills;oral care regimen;contact guard assist  -JW (r) MF (t) JW (c)     Oral Care teeth brushed - regular toothbrush  -JW (r) MF (t) JW (c)     Position (Grooming) supported standing  -JW (r) MF (t) JW (c)               User Key  (r) = Recorded By, (t) = Taken By, (c) = Cosigned By      Initials Name Provider Type    Maia Leggett, OT Occupational Therapist    Aleyda Samayoa OT Student OT Student                   Obj/Interventions       Row Name 07/17/24 1228          Sensory Assessment (Somatosensory)    Sensory Assessment (Somatosensory) UE sensation intact  -JW (r) MF (t) JW (c)       Row Name 07/17/24 1228          Vision Assessment/Intervention    Visual Impairment/Limitations WFL  -JW (r) MF (t) JW (c)       Row Name 07/17/24 1228          Range of Motion Comprehensive    General Range of Motion bilateral upper extremity ROM WFL  -JW (r) MF (t) JW (c)       Row Name 07/17/24 1228          Strength Comprehensive (MMT)    General Manual Muscle Testing (MMT) Assessment no strength deficits identified  -JW (r) MF (t) JW (c)       Row Name 07/17/24 1228          Motor Skills    Motor Skills coordination;functional endurance  -JW (r) MF (t) JW (c)     Coordination WFL  -JW (r) MF (t) JW (c)     Functional Endurance fair  -JW (r) MF (t) JW (c)       Row Name 07/17/24 1228          Balance    Balance Assessment sitting static balance;sitting dynamic balance;sit to stand dynamic balance;standing static balance;standing dynamic balance  -JW (r) MF (t) JW (c)     Static Sitting Balance standby assist;1-person assist  -JW (r) MF (t) JW (c)     Dynamic Sitting Balance contact guard;1-person assist  -JW (r) MF (t) JW (c)      Position, Sitting Balance sitting edge of bed  -JW (r) MF (t) JW (c)     Sit to Stand Dynamic Balance contact guard;1-person assist  -JW (r) MF (t) JW (c)     Static Standing Balance standby assist;1-person assist  -JW (r) MF (t) JW (c)     Dynamic Standing Balance 1-person assist;contact guard  -JW (r) MF (t) JW (c)     Balance Interventions sitting;sit to stand;standing;supported;static;dynamic;minimal challenge  -JW (r) MF (t) JW (c)     Comment, Balance no LOBs  -JW (r) MF (t) JW (c)               User Key  (r) = Recorded By, (t) = Taken By, (c) = Cosigned By      Initials Name Provider Type    Maia Leggett OT Occupational Therapist    Aleyda Samayoa, OT Student OT Student                   Goals/Plan       Row Name 07/17/24 1240          Transfer Goal 1 (OT)    Activity/Assistive Device (Transfer Goal 1, OT) transfers, all  -GUSTAVO (r) WING (t) JW (c)     [place] Level/Cues Needed (Transfer Goal 1, OT) standby assist  -GUSTAVO (r) WING (t) JW (c)     Time Frame (Transfer Goal 1, OT) short term goal (STG);1 day  -GUSTAVO (r) WING (t) JW (c)     Progress/Outcome (Transfer Goal 1, OT) goal met  -GUSTAVO (r) MF (t) JW (c)               User Key  (r) = Recorded By, (t) = Taken By, (c) = Cosigned By      Initials Name Provider Type    Maia Leggett OT Occupational Therapist    Aleyda Samayoa, OT Student OT Student                   Clinical Impression       Row Name 07/17/24 1230          Pain Assessment    Pretreatment Pain Rating 0/10 - no pain  -JW (r) MF (t) JW (c)     Posttreatment Pain Rating 0/10 - no pain  -GUSTAVO (r) WING (t) JW (c)       Row Name 07/17/24 1230          Plan of Care Review    Plan of Care Reviewed With patient;daughter  -JW (r) WING (t) GUSTAVO (c)     Progress no change  -JW (r) MF (t) JW (c)     Outcome Evaluation Pt is an 84 year old F with hx of osteoporosis and tobacco dependency admitted with subdural hematoma from fall at home. Pt lives alone and is (I) with ADLs and functional mobility using  no AD. Pt reports she has a walker and cane at home if needed. Pt also has 4 DEMETRICE and electric chair for flight of steps. Pt reports no prior hx of falls. Today, pt demo'd SBA bed mobility and CGA STS transfer and functional mobility. Pt ambulated appropriate household distance to  where she completed CGA toilet transfer and toileting skills with SBA. Pt also completed g/h task standing sinkside. Overall, pt presents at or around her baseline and has no safety concerns returning home. No further acute OT needs at this time, anticipate d/c home with assist from daughter. OT will sign off  -JW (r) MF (t) JW (c)       Row Name 07/17/24 1230          Therapy Assessment/Plan (OT)    Rehab Potential (OT) other (see comments)  -JW (r) MF (t) JW (c)     Criteria for Skilled Therapeutic Interventions Met (OT) no;no problems identified which require skilled intervention  -JW (r) MF (t) JW (c)     Therapy Frequency (OT) evaluation only  -JW (r) MF (t) JW (c)       Row Name 07/17/24 1230          Therapy Plan Review/Discharge Plan (OT)    Anticipated Discharge Disposition (OT) home with assist  -JW (r) MF (t) JW (c)       Row Name 07/17/24 1230          Vital Signs    O2 Delivery Pre Treatment room air  -JW (r) MF (t) JW (c)     O2 Delivery Intra Treatment room air  -JW (r) MF (t) JW (c)     O2 Delivery Post Treatment room air  -JW (r) MF (t) JW (c)       Row Name 07/17/24 1230          Positioning and Restraints    Pre-Treatment Position in bed  -JW (r) MF (t) JW (c)     Post Treatment Position bed  -JW (r) MF (t) JW (c)     In Bed notified nsg;supine;call light within reach;encouraged to call for assist;with family/caregiver  -JW (r) MF (t) JW (c)               User Key  (r) = Recorded By, (t) = Taken By, (c) = Cosigned By      Initials Name Provider Type    Maia Leggett, OT Occupational Therapist    Aleyda Samayoa, CLEMENCIA Student OT Student                   Outcome Measures       Row Name 07/17/24 1240          How  much help from another is currently needed...    Putting on and taking off regular lower body clothing? 3  -JW (r) MF (t) JW (c)     Bathing (including washing, rinsing, and drying) 3  -JW (r) MF (t) JW (c)     Toileting (which includes using toilet bed pan or urinal) 4  -JW (r) MF (t) JW (c)     Putting on and taking off regular upper body clothing 4  -JW (r) MF (t) JW (c)     Taking care of personal grooming (such as brushing teeth) 4  -JW (r) MF (t) JW (c)     Eating meals 4  -JW (r) MF (t) JW (c)     AM-PAC 6 Clicks Score (OT) 22  -JW (r) MF (t)       Row Name 07/17/24 0800          How much help from another person do you currently need...    Turning from your back to your side while in flat bed without using bedrails? 4  -HD (r) EM (t) HD (c)     Moving from lying on back to sitting on the side of a flat bed without bedrails? 4  -HD (r) EM (t) HD (c)     Moving to and from a bed to a chair (including a wheelchair)? 4  -HD (r) EM (t) HD (c)     Standing up from a chair using your arms (e.g., wheelchair, bedside chair)? 4  -HD (r) EM (t) HD (c)     Climbing 3-5 steps with a railing? 4  -HD (r) EM (t) HD (c)     To walk in hospital room? 4  -HD (r) EM (t) HD (c)     AM-PAC 6 Clicks Score (PT) 24  -HD (r) EM (t)     Highest Level of Mobility Goal 8 --> Walked 250 feet or more  -HD (r) EM (t)       Row Name 07/17/24 1240          Modified [place] Scale    Modified [place] Scale 2 - Slight disability.  Unable to carry out all previous activities but able to look after own affairs without assistance.  -JW (r) MF (t) JW (c)       Row Name 07/17/24 1240          Functional Assessment    Outcome Measure Options AM-PAC 6 Clicks Daily Activity (OT);Modified [place]  -JW (r) MF (t) JW (c)               User Key  (r) = Recorded By, (t) = Taken By, (c) = Cosigned By      Initials Name Provider Type    Lauren Cid, RN Registered Nurse    Maia Leggett, OT Occupational Therapist    Raven Lomeli, PCT Tech/[facility]  Aleyda Acevedo, OT Student OT Student                    Occupational Therapy Education       Title: PT OT SLP Therapies (Done)       Topic: Occupational Therapy (Done)       Point: ADL training (Done)       Description:   Instruct learner(s) on proper safety adaptation and remediation techniques during self care or transfers.   Instruct in proper use of assistive devices.                  Learning Progress Summary             Patient Acceptance, E, DU by  at 7/17/2024 1241   Family Acceptance, E, DU by  at 7/17/2024 1241                         Point: Home exercise program (Done)       Description:   Instruct learner(s) on appropriate technique for monitoring, assisting and/or progressing therapeutic exercises/activities.                  Learning Progress Summary             Patient Acceptance, E, DU by  at 7/17/2024 1241   Family Acceptance, E, DU by  at 7/17/2024 1241                         Point: Precautions (Done)       Description:   Instruct learner(s) on prescribed precautions during self-care and functional transfers.                  Learning Progress Summary             Patient Acceptance, E, DU by  at 7/17/2024 1241   Family Acceptance, E, DU by  at 7/17/2024 1241                         Point: Body mechanics (Done)       Description:   Instruct learner(s) on proper positioning and spine alignment during self-care, functional mobility activities and/or exercises.                  Learning Progress Summary             Patient Acceptance, E, DU by  at 7/17/2024 1241   Family Acceptance, E, DU by  at 7/17/2024 1241                                         User Key       Initials Effective Dates Name Provider Type Discipline     04/30/24 -  Aleyda Acevedo OT Student OT Student OT                  OT Recommendation and Plan  Therapy Frequency (OT): evaluation only  Plan of Care Review  Plan of Care Reviewed With: patient, daughter  Progress: no change  Outcome Evaluation: Pt is  an 84 year old F with hx of osteoporosis and tobacco dependency admitted with subdural hematoma from fall at home. Pt lives alone and is (I) with ADLs and functional mobility using no AD. Pt reports she has a walker and cane at home if needed. Pt also has 4 DEMETRICE and electric chair for flight of steps. Pt reports no prior hx of falls. Today, pt demo'd SBA bed mobility and CGA STS transfer and functional mobility. Pt ambulated appropriate household distance to  where she completed CGA toilet transfer and toileting skills with SBA. Pt also completed g/h task standing sinkside. Overall, pt presents at or around her baseline and has no safety concerns returning home. No further acute OT needs at this time, anticipate d/c home with assist from daughter. OT will sign off     Time Calculation:   Evaluation Complexity (OT)  Review Occupational Profile/Medical/Therapy History Complexity: brief/low complexity  Assessment, Occupational Performance/Identification of Deficit Complexity: 1-3 performance deficits  Clinical Decision Making Complexity (OT): problem focused assessment/low complexity  Overall Complexity of Evaluation (OT): low complexity     Time Calculation- OT       Row Name 07/17/24 1241             Time Calculation- OT    OT Start Time 1115  -JW (r) MF (t) JW (c)      OT Stop Time 1138  -JW (r) MF (t) JW (c)      OT Time Calculation (min) 23 min  -JW (r) MF (t)      Total Timed Code Minutes- OT 13 minute(s)  -JW (r) MF (t) JW (c)      OT Received On 07/17/24  -JW (r) MF (t) JW (c)         Timed Charges    94127 - OT Self Care/Mgmt Minutes 13  -JW (r) MF (t) JW (c)         Untimed Charges    OT Eval/Re-eval Minutes 10  -JW (r) MF (t) JW (c)         Total Minutes    Timed Charges Total Minutes 13  -JW (r) MF (t)      Untimed Charges Total Minutes 10  -JW (r) MF (t)       Total Minutes 23  -JW (r) MF (t)                User Key  (r) = Recorded By, (t) = Taken By, (c) = Cosigned By      Initials Name Provider Type     Maia Leggett, OT Occupational Therapist     Aleyda Acevedo OT Student OT Student                  Therapy Charges for Today       Code Description Service Date Service Provider Modifiers Qty    39916257360 HC OT SELF CARE/MGMT/TRAIN EA 15 MIN 7/17/2024 Aleyda Acevedo OT Student GO 1    [id number]  OT EVAL LOW COMPLEXITY 2 7/17/2024 Aleyda Acevedo OT Student GO 1                 CLEMENCIA Casas  7/17/2024

## 2024-07-17 NOTE — NURSING NOTE
07/17/24 1316   Wound 07/16/24 2204 Right lower leg Skin Tear   Placement Date/Time: 07/16/24 2204   Side: Right  Orientation: lower  Location: leg  Primary Wound Type: Skin Tear   Dressing Appearance dry;intact   Base clean;moist;red;purple   Periwound ecchymotic   Edges irregular   Drainage Amount none   Care, Wound cleansed with;sterile normal saline   Dressing Care dressing changed  (versatel, roll gauze)   Wound 07/16/24 2205 Right upper leg Skin Tear   Placement Date/Time: 07/16/24 2205   Side: Right  Orientation: upper  Location: leg  Primary Wound Type: Skin Tear   Dressing Appearance dry;intact   Base clean;maroon/purple;red   Periwound ecchymotic   Edges irregular   Drainage Amount none     WOCN consult: patient fell and has skin tears to right lower leg. Wound care performed. Applied versatel non adherent dressing . Explained to patient and daughter dressing can be changed every 5-7 days. Discussed Home wound care, may use vaseline gauze. Legs ecchymotic. Patient states she bruises easily. No complaints of discomfort with dressing change.

## 2024-07-17 NOTE — PLAN OF CARE
Goal Outcome Evaluation:            Pt admitted for SDH. AAOx4, RA. L eyebrow lac w/ sutures-dressed w/ vas gauze/gauze. Bruising to L forehead/eye/cheek. R scott has skin tears x2. Dressed w/ mepitel/gauze/kerlex. Wound consult placed. Moves all extremities equally. Regular diet. SCD to LLE. Requests bedpan when needed. Keeping SBP<150. AM CT complete. Replaced potassium.

## 2024-07-17 NOTE — PLAN OF CARE
Goal Outcome Evaluation:  Plan of Care Reviewed With: patient, daughter           Outcome Evaluation: Pt is an 83 yo F admitted from home after a fall - work-up revealed SDH. Pt lives alone and reports independence at BL with no AD, but has a cane, walker, and WC if needed. Pt denies other falls hx, reports 3 DEMETRICE and a chair lift inside. Pt presents to PT with minimal functional deficits, appearing to be at her BL. Pt transferred to EOB with SBA, STS with SBA, and ambulated 250ft with SBA and no AD. Pt demo's overall steadiness and independence with gait, no overt LOB or safety concerns. Pt returned to room and transport arrived to take pt to 5N - left sitting in  transport chair. Pt denies any further acute PT needs, encouraged continued ambulation with nsg and calling out to walk to the bathroom upstairs. Pt safe to DC home with family assist as needed, PT will sign-off.      Anticipated Discharge Disposition (PT): home with assist

## 2024-07-17 NOTE — CASE MANAGEMENT/SOCIAL WORK
Discharge Planning Assessment  [facility]     Patient Name: Trice Melara  MRN: [id number]  Today's Date: 7/17/2024    Admit Date: 7/16/2024    Plan: Home  no needs   Discharge Needs Assessment       Row Name 07/17/24 1347       Living Environment    People in Home alone    Current Living Arrangements home    Potentially Unsafe Housing Conditions none    In the past 12 months has the electric, gas, oil, or water company threatened to shut off services in your home? No    Primary Care Provided by self;child(alessio)    Provides Primary Care For no one    Family Caregiver if Needed none    Quality of Family Relationships supportive    Able to Return to Prior Arrangements yes       Resource/Environmental Concerns    Transportation Concerns none       Transportation Needs    In the past 12 months, has lack of transportation kept you from medical appointments or from getting medications? no    In the past 12 months, has lack of transportation kept you from meetings, work, or from getting things needed for daily living? No       Food Insecurity    Within the past 12 months, you worried that your food would run out before you got the money to buy more. Never true    Within the past 12 months, the food you bought just didn't last and you didn't have money to get more. Never true       Transition Planning    Patient/Family Anticipates Transition to home    Patient/Family Anticipated Services at Transition none    Transportation Anticipated family or friend will provide       Discharge Needs Assessment    Equipment Currently Used at Home none    Concerns to be Addressed discharge planning    Equipment Needed After Discharge none                   Discharge Plan       Row Name 07/17/24 1348       Plan    Plan Home  no needs    Plan Comments CCP spoke to tyra  at bedside to discuss discharge planning.  Pt lives with her daughter Kristy.  Pt is IADL's.  She lives in a two story house with a chair lift.  She uses no DME to  ambulate  She has no HH history  She has been to [facility] for rehab in the past.  Plan is home  Pt denies needs                  Continued Care and Services - Admitted Since 7/16/2024    No active coordination exists for this encounter.          Demographic Summary    No documentation.                  Functional Status    No documentation.                  Psychosocial    No documentation.                  Abuse/Neglect    No documentation.                  Legal    No documentation.                  Substance Abuse    No documentation.                  Patient Forms    No documentation.                     Jeanie Garcia RN

## 2024-07-17 NOTE — ED NOTES
Nursing report ED to floor  Trice Melara  84 y.o.  female    HPI :  HPI (Adult)  Stated Reason for Visit: fall head injury    Chief Complaint  Chief Complaint   Patient presents with    Fall    Head Injury       Admitting doctor:   Curtis Shepard MD    Admitting diagnosis:   The primary encounter diagnosis was Subdural hematoma. Diagnoses of Laceration of forehead, initial encounter, Skin tear of right lower leg without complication, initial encounter, and Fall, initial encounter were also pertinent to this visit.    Code status:   Current Code Status       Date Active Code Status Order ID Comments User Context       Prior            Allergies:   Patient has no known allergies.    Isolation:   No active isolations    Intake and Output  No intake or output data in the 24 hours ending 07/16/24 2027    Weight:       07/16/24  1701   Weight: 54.4 kg (119 lb 14.9 oz)       Most recent vitals:   Vitals:    07/16/24 1949 07/16/24 1953 07/16/24 2000 07/16/24 2003   BP:       Pulse: 107 98 99 110   Resp:       Temp:       SpO2: 95% 95% 95% 96%   Weight:       Height:           Active LDAs/IV Access:   Lines, Drains & Airways       Active LDAs       Name Placement date Placement time Site Days    Peripheral IV 07/16/24 1945 Right Antecubital 07/16/24 1945  Antecubital  less than 1                    Labs (abnormal labs have a star):   Labs Reviewed   COMPREHENSIVE METABOLIC PANEL - Abnormal; Notable for the following components:       Result Value    Glucose 118 (*)     BUN 7 (*)     Potassium 2.8 (*)     AST (SGOT) 37 (*)     All other components within normal limits    Narrative:     GFR Normal >60  Chronic Kidney Disease <60  Kidney Failure <15    The GFR formula is only valid for adults with stable renal function between ages 18 and 70.   CBC WITH AUTO DIFFERENTIAL - Abnormal; Notable for the following components:    Lymphocyte % 16.4 (*)     Neutrophils, Absolute 7.39 (*)     Monocytes, Absolute 1.17 (*)     All other  components within normal limits   PROTIME-INR   APTT   CBC AND DIFFERENTIAL    Narrative:     The following orders were created for panel order CBC & Differential.  Procedure                               Abnormality         Status                     ---------                               -----------         ------                     CBC Auto Differential[[id number]]        Abnormal            Final result                 Please view results for these tests on the individual orders.       EKG:   No orders to display       Meds given in ED:   Medications   Lido-EPINEPHrine-Tetracaine 4-0.18-0.5 % gel 3 mL (3 mL Topical Given 7/16/24 3876)   lidocaine 1% - EPINEPHrine 1:376995 (XYLOCAINE W/EPI) 1 %-1:164986 injection 20 mL (20 mL Injection Given by Other 7/16/24 8214)       Imaging results:  CT Head Without Contrast    Result Date: 7/16/2024   Acute subdural hematoma noted anterior and lateral to the left frontal lobe measuring a maximal diameter of approximately 4 mm. No significant mass effect is seen as a result of this subdural hematoma.  8 mm focus of fat and calcific density within the suprasellar cistern intimately associated with the right aspect of the hypothalamus that is compatible with either a lipoma or a dermoid.  Moderate size left anterior frontal convexity scalp hematoma extending into the left preseptal soft tissues.   TECHNIQUE: CT scan of the cervical spine was obtained with 1 mm axial bone algorithm and 2 mm axial soft tissue algorithm images. Sagittal and coronal reconstructed images were obtained.  FINDINGS:  There is no evidence for acute fracture or bony malalignment involving the cervical spine.  Incidental degenerative phenomena are appreciated within the cervical spine with disc osteophyte complexes at C5-6 and C6-7 resulting in up to moderate to severe degrees of stenoses. Multilevel foraminal stenotic changes are also noted from C4-5 down to C6-7.  IMPRESSION:  No evidence for acute  fracture or bony malalignment involving the cervical spine.  Incidental degenerative phenomena as discussed above.  These findings were discussed with Dr. Darian Ross on 07/16/2024 at approximately 6:14 p.m.    Radiation dose reduction techniques were utilized, including automated exposure control and exposure modulation based on body size.  This report was finalized on 7/16/2024 6:44 PM by Dr. Jaylan Donato M.D on Workstation: [id number]      CT Cervical Spine Without Contrast    Result Date: 7/16/2024   Acute subdural hematoma noted anterior and lateral to the left frontal lobe measuring a maximal diameter of approximately 4 mm. No significant mass effect is seen as a result of this subdural hematoma.  8 mm focus of fat and calcific density within the suprasellar cistern intimately associated with the right aspect of the hypothalamus that is compatible with either a lipoma or a dermoid.  Moderate size left anterior frontal convexity scalp hematoma extending into the left preseptal soft tissues.   TECHNIQUE: CT scan of the cervical spine was obtained with 1 mm axial bone algorithm and 2 mm axial soft tissue algorithm images. Sagittal and coronal reconstructed images were obtained.  FINDINGS:  There is no evidence for acute fracture or bony malalignment involving the cervical spine.  Incidental degenerative phenomena are appreciated within the cervical spine with disc osteophyte complexes at C5-6 and C6-7 resulting in up to moderate to severe degrees of stenoses. Multilevel foraminal stenotic changes are also noted from C4-5 down to C6-7.  IMPRESSION:  No evidence for acute fracture or bony malalignment involving the cervical spine.  Incidental degenerative phenomena as discussed above.  These findings were discussed with Dr. Darian Ross on 07/16/2024 at approximately 6:14 p.m.    Radiation dose reduction techniques were utilized, including automated exposure control and exposure modulation based on body  size.  This report was finalized on 7/16/2024 6:44 PM by Dr. Jaylan Donato M.D on Workstation: [id number]       Ambulatory status:   - ad stefan.    Social issues:   Social History     Socioeconomic History    Marital status:    Tobacco Use    Smoking status: Some Days     Current packs/day: 0.25     Average packs/day: 0.3 packs/day for 50.0 years (12.5 ttl pk-yrs)     Types: Cigarettes    Smokeless tobacco: Never    Tobacco comments:     5-6 CIG DAY   Substance and Sexual Activity    Alcohol use: Yes     Comment: 1-2 per evening    Drug use: No    Sexual activity: Defer       Peripheral Neurovascular  Peripheral Neurovascular (Adult)  Peripheral Neurovascular WDL: WDL    Neuro Cognitive  Neuro Cognitive (Adult)  Cognitive/Neuro/Behavioral WDL: WDL  Marcus Coma Scale  Best Eye Response: 4-->(E4) spontaneous  Best Motor Response: 6-->(M6) obeys commands  Best Verbal Response: 5-->(V5) oriented  Roscoe Coma Scale Score: 15    Learning  Learning Assessment (Adult)  Learning Readiness and Ability: no barriers identified    Respiratory  Respiratory WDL  Respiratory WDL: WDL    Abdominal Pain  Safety Interventions  Safety Precautions/Falls Reduction: assistive device/personal items within reach, fall reduction program maintained    Pain Assessments  Pain (Adult)  (0-10) Pain Rating: Rest: 4    NIH Stroke Scale       Jennie Castillo RN  07/16/24 20:27 EDT

## 2024-07-17 NOTE — SIGNIFICANT NOTE
07/17/24 1044   OTHER   Discipline speech language pathologist   Rehab Time/Intention   Session Not Performed other (see comments)  (Discussed with RN. Passed screen. No indications for speech therapy eval at this time. SLP to s/o, please re-consult as warranted.)

## 2024-07-18 ENCOUNTER — APPOINTMENT (OUTPATIENT)
Dept: CT IMAGING | Facility: HOSPITAL | Age: 84
End: 2024-07-18
Payer: MEDICARE

## 2024-07-18 ENCOUNTER — READMISSION MANAGEMENT (OUTPATIENT)
Dept: CALL CENTER | Facility: HOSPITAL | Age: 84
End: 2024-07-18
Payer: MEDICARE

## 2024-07-18 VITALS
DIASTOLIC BLOOD PRESSURE: 73 MMHG | BODY MASS INDEX: 22.46 KG/M2 | SYSTOLIC BLOOD PRESSURE: 105 MMHG | WEIGHT: 111.4 LBS | OXYGEN SATURATION: 96 % | HEIGHT: 59 IN | RESPIRATION RATE: 12 BRPM | TEMPERATURE: 98.1 F | HEART RATE: 86 BPM

## 2024-07-18 DIAGNOSIS — S06.5XAA SUBDURAL HEMATOMA: Primary | ICD-10-CM

## 2024-07-18 LAB
ANION GAP SERPL CALCULATED.3IONS-SCNC: 8 MMOL/L (ref 5–15)
BASOPHILS # BLD AUTO: 0.08 10*3/MM3 (ref 0–0.2)
BASOPHILS NFR BLD AUTO: 1.2 % (ref 0–1.5)
BUN SERPL-MCNC: 7 MG/DL (ref 8–23)
BUN/CREAT SERPL: 13.5 (ref 7–25)
CALCIUM SPEC-SCNC: 8.5 MG/DL (ref 8.6–10.5)
CHLORIDE SERPL-SCNC: 105 MMOL/L (ref 98–107)
CO2 SERPL-SCNC: 26 MMOL/L (ref 22–29)
CREAT SERPL-MCNC: 0.52 MG/DL (ref 0.57–1)
DEPRECATED RDW RBC AUTO: 39.4 FL (ref 37–54)
EGFRCR SERPLBLD CKD-EPI 2021: 90.6 ML/MIN/1.73
EOSINOPHIL # BLD AUTO: 0.29 10*3/MM3 (ref 0–0.4)
EOSINOPHIL NFR BLD AUTO: 4.4 % (ref 0.3–6.2)
ERYTHROCYTE [DISTWIDTH] IN BLOOD BY AUTOMATED COUNT: 12.8 % (ref 12.3–15.4)
GLUCOSE SERPL-MCNC: 103 MG/DL (ref 65–99)
HCT VFR BLD AUTO: 38.3 % (ref 34–46.6)
HGB BLD-MCNC: 12.6 G/DL (ref 12–15.9)
IMM GRANULOCYTES # BLD AUTO: 0.02 10*3/MM3 (ref 0–0.05)
IMM GRANULOCYTES NFR BLD AUTO: 0.3 % (ref 0–0.5)
LYMPHOCYTES # BLD AUTO: 1.46 10*3/MM3 (ref 0.7–3.1)
LYMPHOCYTES NFR BLD AUTO: 22.3 % (ref 19.6–45.3)
MAGNESIUM SERPL-MCNC: 1.7 MG/DL (ref 1.6–2.4)
MCH RBC QN AUTO: 28.2 PG (ref 26.6–33)
MCHC RBC AUTO-ENTMCNC: 32.9 G/DL (ref 31.5–35.7)
MCV RBC AUTO: 85.7 FL (ref 79–97)
MONOCYTES # BLD AUTO: 0.84 10*3/MM3 (ref 0.1–0.9)
MONOCYTES NFR BLD AUTO: 12.8 % (ref 5–12)
NEUTROPHILS NFR BLD AUTO: 3.86 10*3/MM3 (ref 1.7–7)
NEUTROPHILS NFR BLD AUTO: 59 % (ref 42.7–76)
NRBC BLD AUTO-RTO: 0 /100 WBC (ref 0–0.2)
PLATELET # BLD AUTO: 146 10*3/MM3 (ref 140–450)
PMV BLD AUTO: 10.9 FL (ref 6–12)
POTASSIUM SERPL-SCNC: 3.6 MMOL/L (ref 3.5–5.2)
RBC # BLD AUTO: 4.47 10*6/MM3 (ref 3.77–5.28)
SODIUM SERPL-SCNC: 139 MMOL/L (ref 136–145)
WBC NRBC COR # BLD AUTO: 6.55 10*3/MM3 (ref 3.4–10.8)

## 2024-07-18 PROCEDURE — 83735 ASSAY OF MAGNESIUM: CPT

## 2024-07-18 PROCEDURE — 80048 BASIC METABOLIC PNL TOTAL CA: CPT

## 2024-07-18 PROCEDURE — G0378 HOSPITAL OBSERVATION PER HR: HCPCS

## 2024-07-18 PROCEDURE — 85025 COMPLETE CBC W/AUTO DIFF WBC: CPT

## 2024-07-18 PROCEDURE — 36415 COLL VENOUS BLD VENIPUNCTURE: CPT

## 2024-07-18 PROCEDURE — 70450 CT HEAD/BRAIN W/O DYE: CPT

## 2024-07-18 RX ORDER — POTASSIUM CHLORIDE 750 MG/1
40 TABLET, FILM COATED, EXTENDED RELEASE ORAL EVERY 4 HOURS
Status: COMPLETED | OUTPATIENT
Start: 2024-07-18 | End: 2024-07-18

## 2024-07-18 RX ADMIN — FAMOTIDINE 40 MG: 20 TABLET, FILM COATED ORAL at 09:07

## 2024-07-18 RX ADMIN — POTASSIUM CHLORIDE 40 MEQ: 750 TABLET, EXTENDED RELEASE ORAL at 09:07

## 2024-07-18 RX ADMIN — POTASSIUM CHLORIDE 40 MEQ: 750 TABLET, EXTENDED RELEASE ORAL at 12:08

## 2024-07-18 RX ADMIN — LOSARTAN POTASSIUM 25 MG: 25 TABLET, FILM COATED ORAL at 09:07

## 2024-07-18 RX ADMIN — Medication 10 ML: at 09:32

## 2024-07-18 NOTE — DISCHARGE SUMMARY
PHYSICIAN DISCHARGE SUMMARY                                                                        [facility]    Date of admit: 7/16/2024  Date of Discharge:  7/18/2024    PCP: Maia Whitney MD    Discharge Diagnosis:     Subdural hematoma  Acute subdural hematoma secondary to mechanical fall  Mechanical fall  Hypertension  GERD  Tobacco abuse -advised to be good for her health if she stopped  Hypokalemia status post repletion.  Moderate to severe mitral valve regurgitation  5 mm right lower lobe pulmonary nodule recommend follow-up with repeat CT scan in a year if patient desires    Secondary Diagnoses:  Past Medical History:   Diagnosis Date    Abdominal wall dehiscence 01/08/2016    Fascial Dehiscence    Acute renal failure     Aspiration pneumonia     Colovaginal fistula 2004    Diverticulitis 2010    with paracolonic abscess and colovesical fistula, 2004    Frequent loose stools     Gastric ulcer     GERD (gastroesophageal reflux disease)     H/O Bowel perforation 2004    History of diverticular abscess     History of GI bleed 2010    Osteoarthritis     Peritonitis 01/2016    Septic shock     Small bowel anastomotic leak 01/08/2016       Procedures Performed           Consults:       Hospital Course  Patient is a 84 y.o. female presented with     CC: mechanical fall     History of Present Illness:  Trice Melara is an 84 year old female with a past medical history of GERD, history of gastric ulcer with a history of bowel perforation, tobacco dependence and hypertension.     She presented to the ED on 7/16/24 with complaint of a mechanical fall with head injury.  She reports that she was in the shower, slipped and fell off of her shower chair and hit her head.  She denied any loss of consciousness or pain, did report that she noted that she had a laceration over her left eyebrow and a very large skin tear on her right leg.  She  denies any focal symptoms, felt she was controlling the bleeding did not feel she needed come to the ER.  She had an appointment at her bank which she attended and went to the grocery store.  She kept bleeding from her eyebrow laceration, she tried to go to the [facility] to be seen and they referred her to the ER.  She is not on thinners or antiplatelet therapy.  Denies any dizziness, lightheadedness, chest pain or shortness of breath prior to fall.  ED evaluation included: Potassium 2.8, INR 0.97.  CT head without contrast showed an acute subdural hematoma noted anterior and lateral to the left frontal lobe measuring a maximal diameter approximately 4 mm.          Patient was admitted with subdural hematoma secondary to mechanical fall.  Was seen by neurosurgery.  Neurologically intact.  No surgical intervention was required.  Repeat CT scan is stable and neurosurgery has cleared her for discharge.  She is eager to go home.  Home today.        Condition on Discharge:  Stable.      Vital Signs  Temp:  [97.5 °F (36.4 °C)-98.4 °F (36.9 °C)] 98.1 °F (36.7 °C)  Heart Rate:  [86-92] 86  Resp:  [12-18] 12  BP: ()/(64-82) 105/73    Physical Exam:  General Appearance: no acute distress, appears comfortable  Heart: regular rate and rhythm  Lungs: clear to auscultation bilaterally, respirations unlabored  Abdomen: soft, nontender, nondistended, no guarding/rebound, nl BS  Extremeties: no edema, no cyanosis  Neurology: speech normal, mental status normal, moving all four extremities  Mental Status: alert, oriented        --  Consults:   IP CONSULT TO NEUROSURGERY  IP CONSULT TO PULMONOLOGY  IP CONSULT TO NEURO CLINICAL SPECIALIST  IP CONSULT TO CASE MANAGEMENT   IP CONSULT TO NEUROSURGERY    Significant Discharge Diagnostics   Procedures Performed:         Pertinent Lab Results:  Results from last 7 days   Lab Units 07/18/24  0417 07/17/24  0511 07/16/24  1945   SODIUM mmol/L 139 143 140   POTASSIUM  mmol/L 3.6 3.9 2.8*   CHLORIDE mmol/L 105 104 99   CO2 mmol/L 26.0 28.8 29.0   BUN mg/dL 7* 6* 7*   CREATININE mg/dL 0.52* 0.66 0.61   GLUCOSE mg/dL 103* 99 118*   CALCIUM mg/dL 8.5* 8.8 9.6   AST (SGOT) U/L  --   --  37*   ALT (SGPT) U/L  --   --  20         Results from last 7 days   Lab Units 07/18/24 0418 07/17/24  0511 07/16/24 1945   WBC 10*3/mm3 6.55 6.93 10.48   HEMOGLOBIN g/dL 12.6 13.1 14.6   HEMATOCRIT % 38.3 40.2 43.3   PLATELETS 10*3/mm3 146 171 201   MCV fL 85.7 85.4 83.9   MCH pg 28.2 27.8 28.3   MCHC g/dL 32.9 32.6 33.7   RDW % 12.8 12.7 12.3   RDW-SD fl 39.4 39.0 37.3   MPV fL 10.9 11.3 11.0   NEUTROPHIL % % 59.0 61.0 70.4   LYMPHOCYTE % % 22.3 21.8 16.4*   MONOCYTES % % 12.8* 12.8* 11.2   EOSINOPHIL % % 4.4 2.9 1.0   BASOPHIL % % 1.2 1.2 0.7   IMM GRAN % % 0.3 0.3 0.3   NEUTROS ABS 10*3/mm3 3.86 4.23 7.39*   LYMPHS ABS 10*3/mm3 1.46 1.51 1.72   MONOS ABS 10*3/mm3 0.84 0.89 1.17*   EOS ABS 10*3/mm3 0.29 0.20 0.10   BASOS ABS 10*3/mm3 0.08 0.08 0.07   IMMATURE GRANS (ABS) 10*3/mm3 0.02 0.02 0.03   NRBC /100 WBC 0.0 0.0 0.0     Results from last 7 days   Lab Units 07/16/24 1945   INR  0.97   APTT seconds 26.0     Results from last 7 days   Lab Units 07/18/24 0417 07/17/24 0511   MAGNESIUM mg/dL 1.7 1.6     Results from last 7 days   Lab Units 07/17/24  0511   CHOLESTEROL mg/dL 160   TRIGLYCERIDES mg/dL 62   HDL CHOL mg/dL 70*                                               Imaging Results:  Imaging Results (All)       Procedure Component Value Units Date/Time    CT Head Without Contrast [[id number]] Collected: 07/18/24 0846     Updated: 07/18/24 0933    Narrative:      CT SCAN OF THE HEAD WITHOUT CONTRAST ON 07/18/2024     CLINICAL HISTORY: Follow-up subdural hematoma.     TECHNIQUE: Spiral CT images were obtained from the base of the skull to  the vertex without intravenous contrast. The images were reformatted and  submitted in 3 mm thick axial, sagittal and coronal CT sections with  brain  algorithm.     COMPARISON: This is correlated to 2 recent head CTs including 07/16/2024  and 07/17/2024 at 4:52 a.m.     FINDINGS: There is minimal low-density in the frontal periventricular  white matter consistent with minimal small vessel disease. The remainder  of the brain parenchyma is normal in attenuation. The ventricles are  normal in size. There is a very thin acute subdural hematoma extending  over the anterior inferior and anterior inferior lateral left frontal  lobe. Maximally measures 3 mm in anterior posterior thickness over the  anterior inferior lateral left frontal lobe and is unchanged over the 2  head CTs on 07/16/2024 and 07/17/2024 and exerts no mass effect on the  lateral left anterolateral left frontal lobe parenchyma. There is no  midline shift. No new areas of hemorrhage are seen. There is an 8 x 6 x  6 mm ovoid fatty density mass that has a coarse central calcification  unit measuring 5 x 4 x 4 mm in the right suprasellar region that is  probably a partially calcified lipoma or possibly a dermoid unchanged.  No acute skull fracture is seen. The calvarium and skull base are normal  in appearance. The paranasal sinuses, mastoid air cells and middle ear  cavities are clear and the orbits are normal in appearance.       Impression:      1. No change over the 2 recent head CTs dating back to presenting head  CT on 07/16/2024 at 5:36 p.m. approximately 38 hours ago. There is a  stable very thin acute subdural hematoma extending over the anterior  inferior and anterior inferior lateral left frontal lobe that maximally  measures 3 mm in thickness from inner table skull to anterior inferior  lateral left frontal lobe and exerts no mass effect and follow-up head  CT to resolution of the subdural hematoma is recommended.     2. There is an 8 x 6 x 6 mm fatty density mass that has coarse central  calcification in the right suprasellar cistern that is either a  partially calcified lipoma or small  dermoid unchanged. The remainder of  the head CT is normal.     Radiation dose reduction techniques were utilized, including automated  exposure control and exposure modulation based on body size.        This report was finalized on 7/18/2024 9:30 AM by Dr. Patel Gonzalez M.D  on Workstation: [id number]       CT Head Without Contrast [[id number]] Collected: 07/17/24 0514     Updated: 07/17/24 0514    Narrative:        Patient: NICHO SWIFT  Time Out: 05:14  Exam(s): CT HEAD Without Contrast     EXAM:    CT Head Without Intravenous Contrast    CLINICAL HISTORY:     Reason for exam: fu.    TECHNIQUE:    Axial computed tomography images of the head brain without intravenous   contrast.  CTDI is 55.7 mGy and DLP is 931.3 mGy-cm.  This CT exam was   performed according to the principle of ALARA (As Low As Reasonably   Achievable) by using one or more of the following dose reduction   techniques: automated exposure control, adjustment of the mA and or kV   according to patient size, and or use of iterative reconstruction   technique.    COMPARISON:    No relevant prior studies available.    FINDINGS:    Brain:  Subdural fluid collection redemonstrated left frontal lobe it   is without significant change when compared to study from yesterday.  No   hemorrhage.  No significant white matter disease.    Ventricles:  Unremarkable.  No ventriculomegaly.    Bones joints:  Unremarkable.  No acute fracture.    Soft tissues: The scalp is without significant interval change with   region of small hematoma left side anteriorly..    Sinuses:  Unremarkable as visualized.  No acute sinusitis.    Mastoid air cells:  Unremarkable as visualized.  No mastoid effusion.    Sella:  The suprasellar cistern is without significant interval change.    IMPRESSION:       Without significant interval change.    Impression:          Electronically signed by Tin Palmer MD on 07-17-24 at 0514    CT Head Without Contrast [[id number]] Collected:  07/16/24 1835     Updated: 07/16/24 1847    Narrative:      CT HEAD AND CERVICAL SPINE WITHOUT CONTRAST     CLINICAL HISTORY: injury. Forehead laceration. Neck pain.     TECHNIQUE: CT scan of the head was obtained with 3 mm axial soft tissue  and 2 mm bone algorithm images. No intravenous contrast was  administered. Sagittal and coronal reconstructions were obtained.     COMPARISON: None.     FINDINGS:       There is no evidence for a calvarial fracture. However, there is an  acute subdural hematoma noted anterior and lateral to the left frontal  lobe. This subdural hematoma measures a maximal diameter of  approximately 4 mm anterior to the left frontal lobe.     Incidental note is made of an area of fat and calcific density along the  undersurface of the right aspect of the hypothalamus. It measures up to  approximately 8 mm in diameter and is probably representative of a  lipoma or a dermoid.     The ventricles, sulci, and cisterns are age-appropriate. The gray-white  matter differentiation is within normal limits. The basal ganglia and  thalami are unremarkable in appearance. The posterior fossa structures  are within normal limits.     There is a mild size left anterior frontal convexity scalp hematoma  which extends into the left preseptal soft tissues.       Impression:         Acute subdural hematoma noted anterior and lateral to the left frontal  lobe measuring a maximal diameter of approximately 4 mm. No significant  mass effect is seen as a result of this subdural hematoma.     8 mm focus of fat and calcific density within the suprasellar cistern  intimately associated with the right aspect of the hypothalamus that is  compatible with either a lipoma or a dermoid.     Moderate size left anterior frontal convexity scalp hematoma extending  into the left preseptal soft tissues.        TECHNIQUE: CT scan of the cervical spine was obtained with 1 mm axial  bone algorithm and 2 mm axial soft tissue algorithm  images. Sagittal and  coronal reconstructed images were obtained.     FINDINGS:     There is no evidence for acute fracture or bony malalignment involving  the cervical spine.     Incidental degenerative phenomena are appreciated within the cervical  spine with disc osteophyte complexes at C5-6 and C6-7 resulting in up to  moderate to severe degrees of stenoses. Multilevel foraminal stenotic  changes are also noted from C4-5 down to C6-7.     IMPRESSION:     No evidence for acute fracture or bony malalignment involving the  cervical spine.     Incidental degenerative phenomena as discussed above.     These findings were discussed with Dr. Darian Ross on 07/16/2024 at  approximately 6:14 p.m.           Radiation dose reduction techniques were utilized, including automated  exposure control and exposure modulation based on body size.     This report was finalized on 7/16/2024 6:44 PM by Dr. Jaylan Donato M.D  on Workstation: [id number]       CT Cervical Spine Without Contrast [[id number]] Collected: 07/16/24 1835     Updated: 07/16/24 1847    Narrative:      CT HEAD AND CERVICAL SPINE WITHOUT CONTRAST     CLINICAL HISTORY: injury. Forehead laceration. Neck pain.     TECHNIQUE: CT scan of the head was obtained with 3 mm axial soft tissue  and 2 mm bone algorithm images. No intravenous contrast was  administered. Sagittal and coronal reconstructions were obtained.     COMPARISON: None.     FINDINGS:       There is no evidence for a calvarial fracture. However, there is an  acute subdural hematoma noted anterior and lateral to the left frontal  lobe. This subdural hematoma measures a maximal diameter of  approximately 4 mm anterior to the left frontal lobe.     Incidental note is made of an area of fat and calcific density along the  undersurface of the right aspect of the hypothalamus. It measures up to  approximately 8 mm in diameter and is probably representative of a  lipoma or a dermoid.     The ventricles,  sulci, and cisterns are age-appropriate. The gray-white  matter differentiation is within normal limits. The basal ganglia and  thalami are unremarkable in appearance. The posterior fossa structures  are within normal limits.     There is a mild size left anterior frontal convexity scalp hematoma  which extends into the left preseptal soft tissues.       Impression:         Acute subdural hematoma noted anterior and lateral to the left frontal  lobe measuring a maximal diameter of approximately 4 mm. No significant  mass effect is seen as a result of this subdural hematoma.     8 mm focus of fat and calcific density within the suprasellar cistern  intimately associated with the right aspect of the hypothalamus that is  compatible with either a lipoma or a dermoid.     Moderate size left anterior frontal convexity scalp hematoma extending  into the left preseptal soft tissues.        TECHNIQUE: CT scan of the cervical spine was obtained with 1 mm axial  bone algorithm and 2 mm axial soft tissue algorithm images. Sagittal and  coronal reconstructed images were obtained.     FINDINGS:     There is no evidence for acute fracture or bony malalignment involving  the cervical spine.     Incidental degenerative phenomena are appreciated within the cervical  spine with disc osteophyte complexes at C5-6 and C6-7 resulting in up to  moderate to severe degrees of stenoses. Multilevel foraminal stenotic  changes are also noted from C4-5 down to C6-7.     IMPRESSION:     No evidence for acute fracture or bony malalignment involving the  cervical spine.     Incidental degenerative phenomena as discussed above.     These findings were discussed with Dr. Darian Ross on 07/16/2024 at  approximately 6:14 p.m.           Radiation dose reduction techniques were utilized, including automated  exposure control and exposure modulation based on body size.     This report was finalized on 7/16/2024 6:44 PM by Dr. Jaylan Donato M.D  on  Workstation: [id number]             --    Discharge Disposition  Home or Self Care    Discharge Medications     Discharge Medications        Changes to Medications        Instructions Start Date   clotrimazole-betamethasone 1-0.05 % cream  Commonly known as: LOTRISONE  What changed: See the new instructions.   APPLY TOPICALLY TO LEFT BREAST TWICE DAILY             Continue These Medications        Instructions Start Date   colestipol 1 g tablet  Commonly known as: COLESTID   TAKE 1 TABLET BY MOUTH TWICE DAILY      diphenoxylate-atropine 2.5-0.025 MG per tablet  Commonly known as: LOMOTIL   1 tablet, Oral, 4 Times Daily PRN      famotidine 40 MG tablet  Commonly known as: PEPCID   40 mg, Oral, Daily      losartan 25 MG tablet  Commonly known as: COZAAR   25 mg, Oral, Daily      omeprazole 20 MG capsule  Commonly known as: priLOSEC   20 mg, Oral, Daily               Discharge Diet: regular diet    Activity at Discharge:      Follow-up Information       Maia Whitney MD .    Specialty: Internal Medicine  Contact information:  [address] [place], [place] [id number] [phone]                           See primary care provider, Maia Whitney MD, in 1-2 weeks        Test Results Pending at Discharge       Julio Roman MD  [facility], [facility]  Pulmonary and Critical Care Medicine  07/18/24  13:30 EDT    Time: greater than 30 minutes.        Total time spent discharging patient including evaluation, post hospitalization follow up, medication and post hospitalization instructions and education total time exceeds 30 minutes.

## 2024-07-18 NOTE — OUTREACH NOTE
Prep Survey      Flowsheet Row Responses   [facility] facility patient discharged from? [facility]   Is LACE score < 7 ? Yes   Eligibility [facility]   Date of Admission 07/16/24   Date of Discharge 07/18/24   Discharge Disposition Home or Self Care   Discharge diagnosis Subdural hematoma   Does the patient have one of the following disease processes/diagnoses(primary or secondary)? Other   Does the patient have Home health ordered? No   Is there a DME ordered? No   Prep survey completed? Yes            YUNIOR VIEIRA - Registered Nurse

## 2024-07-18 NOTE — PROGRESS NOTES
[facility] NEUROSURGERY INTRACRANIAL PROGRESS NOTE    PATIENT IDENTIFICATION:   Name:  Trice Melara      MRN:  [id number]     84 y.o.  female               Cc: Fall, SDH      Subjective     Interval History: Significant overnight events.  Patient has no complaints of headache, nausea, vomiting, dizziness today.  She is requesting to go home.        Objective     Vital signs in last 24 hours:  Temp:  [97.5 °F (36.4 °C)-98.4 °F (36.9 °C)] 97.5 °F (36.4 °C)  Heart Rate:  [87-94] 92  Resp:  [16-18] 16  BP: ()/(64-82) 114/79      Intake/Output this shift:  No intake/output data recorded.      Intake/Output last 3 shifts:  I/O last 3 completed shifts:  In: 240 [P.O.:240]  Out: -     LABS:  Results from last 7 days   Lab Units 07/18/24 0418 07/17/24 0511 07/16/24 1945   WBC 10*3/mm3 6.55 6.93 10.48   HEMOGLOBIN g/dL 12.6 13.1 14.6   HEMATOCRIT % 38.3 40.2 43.3   PLATELETS 10*3/mm3 146 171 201     Results from last 7 days   Lab Units 07/18/24 0417 07/17/24 0511 07/16/24 1945   SODIUM mmol/L 139 143 140   POTASSIUM mmol/L 3.6 3.9 2.8*   CHLORIDE mmol/L 105 104 99   CO2 mmol/L 26.0 28.8 29.0   BUN mg/dL 7* 6* 7*   CREATININE mg/dL 0.52* 0.66 0.61   CALCIUM mg/dL 8.5* 8.8 9.6   BILIRUBIN mg/dL  --   --  0.7   ALK PHOS U/L  --   --  71   ALT (SGPT) U/L  --   --  20   AST (SGOT) U/L  --   --  37*   GLUCOSE mg/dL 103* 99 118*          IMAGING STUDIES:  CT head: When compared to previous imaging 7/1724 there is been no change in the very thin acute subdural hematoma in the left frontal lobe.  There is no new areas of hemorrhage or mass effect.    I personally viewed the patient's CT head, it was also reviewed by and discussed with Dr Jarred Ron reviewed/changed: Yes  Pepcid 40 mg p.o. daily  Cozaar 25 mg p.o. daily  K-Dur 40 mEq p.o. every 4 hours        Physical Exam:    General:  Awake, alert & oriented x 3.  Speech clear with no aphasia  CN III, IV, VI:  EOM's intact,  PERRL.  CN VII:  Facial movements are  "symmetric, no weakness  Motor:  Normal muscle strength, bulk and tone in bilateral upper and lower extremities.  No fasciculations, rigidity, spasticity, or abnormal movements  Sensation:  Normal to light touch bilaterally   Station & Gait:  Per PT note 7/18-. Pt transferred to EOB with SBA, STS with SBA, and ambulated 250ft with SBA and no AD. Pt demo's overall steadiness and independence with gait, no overt LOB or safety concerns   Skin: Bruising to left side of face      Assessment & Plan     ASSESSMENT:      Subdural hematoma    84-year-old female found to have a subdural hematoma after a slip and fall at home yesterday. Patient is not anticoagulated.  CT head today is stable with no new areas of hemorrhage.  Patient has no complaints today.  She is ready to go home.  She is okay for discharge from neurosurgery standpoint.  She will need to follow-up in 3 weeks with a repeat CT head at that time.    PLAN:   -Okay for discharge from neurosurgery standpoint  -3-week office follow-up with repeat CT head    I discussed the patient's findings and my recommendations with patient and Dr Stern    During patient visit, I utilized appropriate personal protective equipment including  gloves.  Appropriate PPE was worn during the entire visit.  Hand hygiene was completed before and after.      LOS: 0 days       Angle Deleon, APRN  7/18/2024  11:27 EDT    \"Dictated utilizing Dragon dictation\".      "

## 2024-07-18 NOTE — PLAN OF CARE
Goal Outcome Evaluation:  Plan of Care Reviewed With: patient        Progress: no change  Outcome Evaluation: Pt A&OX4, VSS on RA. No c/o pain or discomfort, ambulates to restroom w/ standby assist. No acute change in condition noted on assessment. Safety precautions maintained, plan of care ongoing.      Problem: Adult Inpatient Plan of Care  Goal: Plan of Care Review  Outcome: Ongoing, Progressing  Flowsheets (Taken 7/18/2024 0644)  Progress: no change  Plan of Care Reviewed With: patient  Outcome Evaluation: Pt A&OX4, VSS on RA. No c/o pain or discomfort, ambulates to restroom w/ standby assist. No acute change in condition noted on assessment. Safety precautions maintained, plan of care ongoing.  Goal: Patient-Specific Goal (Individualized)  Outcome: Ongoing, Progressing  Goal: Absence of Hospital-Acquired Illness or Injury  Outcome: Ongoing, Progressing  Intervention: Identify and Manage Fall Risk  Recent Flowsheet Documentation  Taken 7/18/2024 0644 by Elayne De La Cruz RN  Safety Promotion/Fall Prevention: safety round/check completed  Taken 7/18/2024 0428 by Elayne De La Cruz RN  Safety Promotion/Fall Prevention: safety round/check completed  Taken 7/18/2024 0201 by Elayne De La Cruz RN  Safety Promotion/Fall Prevention: safety round/check completed  Taken 7/18/2024 0000 by Elayne De La Cruz RN  Safety Promotion/Fall Prevention:   activity supervised   assistive device/personal items within reach   elopement precautions   clutter free environment maintained   fall prevention program maintained   lighting adjusted   nonskid shoes/slippers when out of bed   room organization consistent   safety round/check completed  Taken 7/17/2024 2030 by Elayne De La Cruz RN  Safety Promotion/Fall Prevention:   safety round/check completed   activity supervised   assistive device/personal items within reach   clutter free environment maintained   fall prevention program maintained   lighting adjusted   nonskid shoes/slippers when out of bed    room organization consistent  Intervention: Prevent Skin Injury  Recent Flowsheet Documentation  Taken 7/18/2024 0644 by Elayne De La Cruz RN  Body Position: position changed independently  Taken 7/18/2024 0428 by Elayne De La Cruz RN  Body Position: position changed independently  Taken 7/18/2024 0201 by Elayne De La Cruz RN  Body Position: position changed independently  Taken 7/18/2024 0000 by Elayne De La Cruz RN  Body Position: position changed independently  Skin Protection: adhesive use limited  Taken 7/17/2024 2030 by Elayne De La Cruz RN  Body Position: position changed independently  Skin Protection: adhesive use limited  Intervention: Prevent and Manage VTE (Venous Thromboembolism) Risk  Recent Flowsheet Documentation  Taken 7/18/2024 0000 by Elayne De La Cruz RN  Range of Motion: active ROM (range of motion) encouraged  Taken 7/17/2024 2030 by Elayne De La Cruz RN  VTE Prevention/Management:   left   sequential compression devices on  Range of Motion: active ROM (range of motion) encouraged  Intervention: Prevent Infection  Recent Flowsheet Documentation  Taken 7/18/2024 0000 by Elayne De La Cruz RN  Infection Prevention:   environmental surveillance performed   rest/sleep promoted   single patient room provided  Taken 7/17/2024 2030 by Elayne De La Cruz RN  Infection Prevention:   environmental surveillance performed   rest/sleep promoted   single patient room provided  Goal: Optimal Comfort and Wellbeing  Outcome: Ongoing, Progressing  Intervention: Provide Person-Centered Care  Recent Flowsheet Documentation  Taken 7/18/2024 0000 by Elayne De La Cruz RN  Trust Relationship/Rapport:   care explained   choices provided  Taken 7/17/2024 2030 by Elayne De La Cruz RN  Trust Relationship/Rapport:   care explained   choices provided   questions answered   reassurance provided  Goal: Readiness for Transition of Care  Outcome: Ongoing, Progressing     Problem: Skin Injury Risk Increased  Goal: Skin Health and Integrity  Outcome: Ongoing,  Progressing  Intervention: Optimize Skin Protection  Recent Flowsheet Documentation  Taken 7/18/2024 0644 by Elayne De La Cruz RN  Head of Bed (HOB) Positioning: HOB at 20-30 degrees  Taken 7/18/2024 0428 by Elayne De La Cruz RN  Head of Bed (HOB) Positioning: HOB at 20-30 degrees  Taken 7/18/2024 0201 by Elayne De La Cruz RN  Head of Bed (HOB) Positioning: HOB at 20-30 degrees  Taken 7/18/2024 0000 by Elayne De La Cruz RN  Pressure Reduction Techniques: frequent weight shift encouraged  Head of Bed (HOB) Positioning: HOB elevated  Pressure Reduction Devices: pressure-redistributing mattress utilized  Skin Protection: adhesive use limited  Taken 7/17/2024 2030 by Elayne De La Cruz RN  Pressure Reduction Techniques: frequent weight shift encouraged  Head of Bed (HOB) Positioning: HOB elevated  Pressure Reduction Devices: pressure-redistributing mattress utilized  Skin Protection: adhesive use limited     Problem: Hypertension Comorbidity  Goal: Blood Pressure in Desired Range  Outcome: Ongoing, Progressing  Intervention: Maintain Blood Pressure Management  Recent Flowsheet Documentation  Taken 7/18/2024 0644 by Elayne De La Cruz RN  Medication Review/Management: medications reviewed  Taken 7/18/2024 0428 by Elayne De La Cruz RN  Medication Review/Management: medications reviewed  Taken 7/18/2024 0201 by Elayne De La Cruz RN  Medication Review/Management: medications reviewed  Taken 7/18/2024 0000 by Elayne De La Cruz RN  Medication Review/Management: medications reviewed  Taken 7/17/2024 2030 by Elayne De La Cruz RN  Medication Review/Management: medications reviewed     Problem: Pain Chronic (Persistent) (Comorbidity Management)  Goal: Acceptable Pain Control and Functional Ability  Outcome: Ongoing, Progressing  Intervention: Manage Persistent Pain  Recent Flowsheet Documentation  Taken 7/18/2024 0644 by Elayne De La Cruz RN  Medication Review/Management: medications reviewed  Taken 7/18/2024 0428 by Elayne De La Cruz RN  Medication Review/Management:  medications reviewed  Taken 7/18/2024 0201 by Elayne De La Cruz, RN  Medication Review/Management: medications reviewed  Taken 7/18/2024 0000 by Elayne De La Cruz, RN  Medication Review/Management: medications reviewed  Taken 7/17/2024 2030 by Elayne De La Cruz, COSME  Medication Review/Management: medications reviewed  Intervention: Optimize Psychosocial Wellbeing  Recent Flowsheet Documentation  Taken 7/17/2024 2030 by Elayne De La Cruz, RN  Diversional Activities: television

## 2024-07-20 NOTE — CASE MANAGEMENT/SOCIAL WORK
Case Management Discharge Note      Final Note: home         Selected Continued Care - Discharged on 7/18/2024 Admission date: 7/16/2024 - Discharge disposition: Home or Self Care      Destination    No services have been selected for the patient.                Durable Medical Equipment    No services have been selected for the patient.                Dialysis/Infusion    No services have been selected for the patient.                Home Medical Care    No services have been selected for the patient.                Therapy    No services have been selected for the patient.                Community Resources    No services have been selected for the patient.                Community & DME    No services have been selected for the patient.                         Final Discharge Disposition Code: 01 - home or self-care

## 2024-07-22 ENCOUNTER — TRANSITIONAL CARE MANAGEMENT TELEPHONE ENCOUNTER (OUTPATIENT)
Dept: CALL CENTER | Facility: HOSPITAL | Age: 84
End: 2024-07-22
Payer: MEDICARE

## 2024-07-22 NOTE — OUTREACH NOTE
Call Center TCM Note      Flowsheet Row Responses   [facility] patient discharged from? [place]   Does the patient have one of the following disease processes/diagnoses(primary or secondary)? Other   TCM attempt successful? No   Unsuccessful attempts Attempt 1   Call Status Left message            Frances Parker RN    7/22/2024, 14:33 EDT

## 2024-07-22 NOTE — OUTREACH NOTE
Call Center TCM Note      Flowsheet Row Responses   [facility] patient discharged from? [place]   Does the patient have one of the following disease processes/diagnoses(primary or secondary)? Other   TCM attempt successful? Yes   Call start time 1709   Call end time 1720   Meds reviewed with patient/caregiver? Yes   Is the patient having any side effects they believe may be caused by any medication additions or changes? No   Does the patient have all medications ordered at discharge? N/A   Is the patient taking all medications as directed (includes completed medication regime)? Yes   Comments Patient requests PCP office to call to advise her regarding sutures above left eyebrow, and to schedule hospital f/u appt. Will route message to PCP office for review. Neurosurgery appt 08/08/24.   Does the patient have an appointment with their PCP within 7-14 days of discharge? No   Nursing Interventions Routed TCM call to PCP office, PCP office requested to make appointment - message sent   Has home health visited the patient within 72 hours of discharge? N/A   Psychosocial issues? No   Did the patient receive a copy of their discharge instructions? Yes   Nursing interventions Reviewed instructions with patient   What is the patient's perception of their health status since discharge? Improving   Is the patient/caregiver able to teach back signs and symptoms related to disease process for when to call PCP? Yes   Is the patient/caregiver able to teach back signs and symptoms related to disease process for when to call 911? Yes   Is the patient/caregiver able to teach back the hierarchy of who to call/visit for symptoms/problems? PCP, Specialist, Home health nurse, Urgent Care, ED, 911 Yes   TCM call completed? Yes   Wrap up additional comments Brief call with patient who states is doing well. Patient asked if needed stitches above left eyebrow removed. Per ER notes, patient received two nylon stitches above left  eyebrow. Will route message to PCP office for review. Patient denies any other questions/concerns today. TCM complete.   Call end time 1720   Would this patient benefit from a Referral to [facility] Social Work? No   Is the patient interested in additional calls from an ambulatory ? No            Frances Parker RN    7/22/2024, 17:22 EDT

## 2024-07-23 ENCOUNTER — DOCUMENTATION (OUTPATIENT)
Dept: UROLOGY | Facility: HOSPITAL | Age: 84
End: 2024-07-23
Payer: MEDICARE

## 2024-07-23 DIAGNOSIS — S81.819A: Primary | ICD-10-CM

## 2024-07-24 ENCOUNTER — OFFICE VISIT (OUTPATIENT)
Dept: CARDIOLOGY | Facility: CLINIC | Age: 84
End: 2024-07-24
Payer: MEDICARE

## 2024-07-24 VITALS
WEIGHT: 117.6 LBS | RESPIRATION RATE: 20 BRPM | HEIGHT: 59 IN | OXYGEN SATURATION: 96 % | HEART RATE: 98 BPM | DIASTOLIC BLOOD PRESSURE: 78 MMHG | BODY MASS INDEX: 23.71 KG/M2 | SYSTOLIC BLOOD PRESSURE: 116 MMHG

## 2024-07-24 DIAGNOSIS — R06.02 SOB (SHORTNESS OF BREATH): ICD-10-CM

## 2024-07-24 DIAGNOSIS — I70.0 ATHEROSCLEROSIS OF AORTA: ICD-10-CM

## 2024-07-24 DIAGNOSIS — I25.810 CORONARY ARTERY DISEASE INVOLVING CORONARY BYPASS GRAFT OF NATIVE HEART, UNSPECIFIED WHETHER ANGINA PRESENT: Primary | ICD-10-CM

## 2024-07-24 DIAGNOSIS — N28.9 ABNORMAL KIDNEY FUNCTION: ICD-10-CM

## 2024-07-24 RX ORDER — LOPERAMIDE HYDROCHLORIDE 2 MG/1
2 TABLET ORAL 3 TIMES DAILY PRN
COMMUNITY
Start: 2023-02-16

## 2024-07-24 NOTE — PROGRESS NOTES
Date of Office Visit: 2024  Encounter Provider: Breana Maddox MD  Place of Service: [facility] CARDIOLOGY  Patient Name: Trice Melara  :1940    Chief complaint  Consult requested by Dr. Whitney for mitral valve regurgitation, aortic valve stenosis and coronary artery disease with coronary calcification.    History of Present Illness  Patient is a 84-year-old female with history of gastric ulcer, peritonitis, hypertension and aspiration pneumonia. She is also recently noted on 2024 to have an abnormal echocardiogram.  This was performed due to murmur.  And showed an ejection fraction is 62%, grade 1A diastolic function aortic valve calcification with borderline aortic valve stenosis and severe mitral calcification with calcification of the leaflets and moderate-severe mitral valve regurgitation which is quite eccentric.  There is no significant stenosis.  RVSP was normal.  She was admitted on 2024 for subdural hematoma following a fall.  During the hospitalization she was also noted to have right pulmonary nodule.  Coronary artery calcification in multiple vessels also noted.    Patient denies any chest pain palpitations syncope near syncope or shortness of breath.  She does tire earlier this year than she did last year.  Blood pressure has been controlled.  Patient was not aware that she needed to see neurology in follow-up and no appointment has been made.      Past Medical History:   Diagnosis Date    Abdominal wall dehiscence 2016    Fascial Dehiscence    Acute renal failure     Aspiration pneumonia     Colovaginal fistula 2004    Diverticulitis     with paracolonic abscess and colovesical fistula,     Frequent loose stools     Gastric ulcer     GERD (gastroesophageal reflux disease)     H/O Bowel perforation     History of diverticular abscess     History of GI bleed     Osteoarthritis     Peritonitis 2016    Septic shock     Small bowel  anastomotic leak 01/08/2016     Past Surgical History:   Procedure Laterality Date    BREAST CYST EXCISION      COLON RESECTION  01/08/2016    Smal Bowel Resection -Dr. Syd Maharaj    COLON SURGERY Left 2010    with closure of fistula    EXPLORATORY LAPAROTOMY  12/31/2015    With Extensive Adhesiolysis of over 1 hour to allow conduct of the operation and repair of small intestine-Dr. Syd Maharaj    EXPLORATORY LAPAROTOMY  01/08/2016    Dr. Syd Maharaj    INCISIONAL HERNIA REPAIR  01/08/2016    Incisional Hernia Repair with Fascial Dehiscence Repair with Mesh and Bilateral Component Separation-Dr. Syd Maharaj    AL TX INTER/AL/SUBTRCHNTRIC FEM FX IMED IMPLTSCREW Right 7/24/2017    Procedure: FEMUR INTRAMEDULLARY NAILING/RODDING;  Surgeon: Huan Weiss MD;  Location: [facility] OR;  Service: Orthopedics    TOTAL ABDOMINAL HYSTERECTOMY  1970s    TOTAL KNEE ARTHROPLASTY Right 4/29/2019    Procedure: RT TOTAL KNEE ARTHROPLASTY WITH NAVIGATION;  Surgeon: Bolivar Glover MD;  Location: [facility] MAIN OR;  Service: Orthopedics    TOTAL KNEE ARTHROPLASTY Left 9/11/2019    Procedure: TOTAL KNEE ARTHROPLASTY;  Surgeon: Bolivar Glover MD;  Location: [facility] MAIN OR;  Service: Orthopedics     Outpatient Medications Prior to Visit   Medication Sig Dispense Refill    colestipol (COLESTID) 1 g tablet TAKE 1 TABLET BY MOUTH TWICE DAILY 180 tablet 1    famotidine (PEPCID) 40 MG tablet Take 1 tablet by mouth Daily. 90 tablet 3    losartan (COZAAR) 25 MG tablet TAKE 1 TABLET BY MOUTH DAILY 30 tablet 5    omeprazole (priLOSEC) 20 MG capsule Take 1 capsule by mouth Daily.      clotrimazole-betamethasone (LOTRISONE) 1-0.05 % cream APPLY TOPICALLY TO LEFT BREAST TWICE DAILY (Patient not taking: Reported on 7/24/2024) 30 g 2    diphenoxylate-atropine (LOMOTIL) 2.5-0.025 MG per tablet Take 1 tablet by mouth 4 (Four) Times a Day As Needed. (Patient not taking: Reported on 7/24/2024)      loperamide (IMODIUM A-D) 2 MG tablet  "Take 1 tablet by mouth 3 (Three) Times a Day As Needed. (Patient not taking: Reported on 7/24/2024)       No facility-administered medications prior to visit.       Allergies as of 07/24/2024    (No Known Allergies)     Social History     Socioeconomic History    Marital status:    Tobacco Use    Smoking status: Some Days     Current packs/day: 0.25     Average packs/day: 0.3 packs/day for 50.0 years (12.5 ttl pk-yrs)     Types: Cigarettes     Passive exposure: Current    Smokeless tobacco: Never    Tobacco comments:     5-6 CIG DAY   Vaping Use    Vaping status: Never Used   Substance and Sexual Activity    Alcohol use: Not Currently     Comment: 1-2 per evening    Drug use: No    Sexual activity: Defer     Family History   Problem Relation Age of Onset    Dementia Mother     Thyroid disease Mother     Other Mother         carotid issues    Leukemia Father     Thyroid disease Brother     Thyroid disease Daughter     Malig Hyperthermia Neg Hx      Review of Systems   Constitutional: Negative for chills, fever, weight gain and weight loss.   Cardiovascular:  Negative for leg swelling.   Respiratory:  Negative for cough, snoring and wheezing.    Hematologic/Lymphatic: Negative for bleeding problem. Does not bruise/bleed easily.   Skin:  Negative for color change.   Musculoskeletal:  Negative for falls, joint pain and myalgias.   Gastrointestinal:  Negative for melena.   Genitourinary:  Negative for hematuria.   Neurological:  Negative for excessive daytime sleepiness.   Psychiatric/Behavioral:  Negative for depression. The patient is not nervous/anxious.         Objective:     Vitals:    07/24/24 0840   BP: 116/78   BP Location: Right arm   Patient Position: Sitting   Cuff Size: Adult   Pulse: 98   Resp: 20   SpO2: 96%   Weight: 53.3 kg (117 lb 9.6 oz)   Height: 149.9 cm (59\")     Body mass index is 23.75 kg/m².    Vitals reviewed.   Constitutional:       Appearance: Well-developed.   Eyes:      General: No " scleral icterus.        Right eye: No discharge.      Conjunctiva/sclera: Conjunctivae normal.      Pupils: Pupils are equal, round, and reactive to light.   HENT:      Head: Normocephalic.      Nose: Nose normal.   Neck:      Thyroid: No thyromegaly.      Vascular: No JVD.   Pulmonary:      Effort: Pulmonary effort is normal. No respiratory distress.      Breath sounds: Normal breath sounds. No wheezing. No rales.   Cardiovascular:      Normal rate. Regular rhythm. Normal S1. Normal S2.       Murmurs: There is a grade 2to 3/6 high frequency blowing holosystolic murmur at the apex.      No gallop.  No click. No rub.   Pulses:     Intact distal pulses.      Carotid: 2+ bilaterally.     Radial: 2+ bilaterally.     Femoral: 2+ bilaterally.     Popliteal: 2+ bilaterally.     Dorsalis pedis: 2+ bilaterally.     Posterior tibial: 2+ bilaterally.  Edema:     Peripheral edema absent.   Abdominal:      General: Bowel sounds are normal. There is no distension.      Palpations: Abdomen is soft.      Tenderness: There is no abdominal tenderness. There is no rebound.   Musculoskeletal: Normal range of motion.         General: No tenderness.      Cervical back: Normal range of motion and neck supple. Skin:     General: Skin is warm and dry.      Findings: No erythema or rash.   Neurological:      Mental Status: Alert and oriented to person, place, and time.   Psychiatric:         Behavior: Behavior normal.         Thought Content: Thought content normal.         Judgment: Judgment normal.       Lab Review:   Lab Results - Last 18 Months   Lab Units 07/18/24  0418 07/17/24  0511 07/16/24  1945 03/11/24  1011 02/28/23  1017   WBC 10*3/mm3 6.55 6.93   < > 8.10 6.13   RBC 10*6/mm3 4.47 4.71   < > 5.14 5.16   HEMOGLOBIN g/dL 12.6 13.1   < > 14.0 14.3   HEMATOCRIT % 38.3 40.2   < > 43.7 43.9   MCV fL 85.7 85.4   < > 85.0 85.1   MCH pg 28.2 27.8   < > 27.2 27.7   MCHC g/dL 32.9 32.6   < > 32.0 32.6   RDW % 12.8 12.7   < > 12.1* 12.8    PLATELETS 10*3/mm3 146 171   < > 171 218   NEUTROPHIL % % 59.0 61.0   < > 71.0 54.7   LYMPHOCYTE % % 22.3 21.8   < > 16.2* 30.7   MONOCYTES % % 12.8* 12.8*   < > 7.8 8.6   EOSINOPHIL % % 4.4 2.9   < > 3.6 3.8   BASOPHIL % % 1.2 1.2   < > 1.0 1.5   NEUTROS ABS 10*3/mm3 3.86 4.23   < > 5.76 3.36   LYMPHS ABS 10*3/mm3 1.46 1.51   < > 1.31 1.88   MONOS ABS 10*3/mm3 0.84 0.89   < > 0.63 0.53   EOS ABS 10*3/mm3 0.29 0.20   < > 0.29 0.23   BASOS ABS 10*3/mm3 0.08 0.08   < > 0.08 0.09   IMM GRAN % %  --   --   --  0.4 0.7*   IMMATURE GRANS (ABS) 10*3/mm3  --   --   --  0.03 0.04   RDW-SD fl 39.4 39.0   < >  --   --    MPV fL 10.9 11.3   < >  --   --     < > = values in this interval not displayed.       Lab Results - Last 18 Months   Lab Units 07/18/24  0417 07/17/24  0511 07/16/24 1945 07/16/24 1945 03/11/24  1011   GLUCOSE mg/dL 103* 99   < > 118* 98   BUN mg/dL 7* 6*   < > 7* 10   CREATININE mg/dL 0.52* 0.66   < > 0.61 0.79   SODIUM mmol/L 139 143   < > 140 143   POTASSIUM mmol/L 3.6 3.9   < > 2.8* 3.9   CHLORIDE mmol/L 105 104   < > 99 102   CO2 mmol/L 26.0 28.8   < > 29.0 28.5   CALCIUM mg/dL 8.5* 8.8   < > 9.6 9.6   TOTAL PROTEIN g/dL  --   --   --  7.0  --    ALBUMIN g/dL  --   --   --  4.3 4.4   ALT (SGPT) U/L  --   --   --  20 13   AST (SGOT) U/L  --   --   --  37* 18   ALK PHOS U/L  --   --   --  71 77   BILIRUBIN mg/dL  --   --   --  0.7 0.6   GLOBULIN gm/dL  --   --   --  2.7  --    A/G RATIO g/dL  --   --   --  1.6  --    BUN / CREAT RATIO  13.5 9.1   < > 11.5 12.7   ANION GAP mmol/L 8.0 10.2   < > 12.0  --    EGFR mL/min/1.73 90.6 85.6   < > 87.2  --     < > = values in this interval not displayed.     Lab Results - Last 18 Months   Lab Units 07/17/24  0511 03/11/24  1011   CHOLESTEROL mg/dL 160  --    TRIGLYCERIDES mg/dL 62 90   HDL CHOL mg/dL 70* 69*   LDL CHOL mg/dL 78 83   VLDL CHOL mg/dL 12  --    VLDL CHOLESTEROL CHRIS mg/dL  --  16   LDL/HDL RATIO  1.11  --        Lab Results - Last 18 Months   Lab  Units 03/11/24  1011 02/28/23  1017   TSH uIU/mL 2.690 2.630     Lab Results - Last 18 Months   Lab Units 07/16/24  1945   PROTIME Seconds 13.0   APTT seconds 26.0       ECG 12 Lead    Date/Time: 7/24/2024 9:01 AM  Performed by: Breana Maddox MD    Authorized by: Maia Whitney MD  Comparison: compared with previous ECG   Similar to previous ECG  Rhythm: sinus rhythm  Other findings: non-specific ST-T wave changes and right atrial abnormality    Clinical impression: abnormal EKG        Assessment:       Diagnosis Plan   1. Coronary artery disease involving coronary bypass graft of native heart, unspecified whether angina present  Homocysteine      2. Atherosclerosis of aorta  Homocysteine      3. Abnormal kidney function        4. SOB (shortness of breath)  proBNP        Plan:       1.  Mitral calcification with moderate-severe mitral regurgitation.  No evidence of heart failure at this time.  She is to see Dr. Whitney later this week and would like to obtain a proBNP with labs she anticipates to be drawn at that time.  She is just recovering from subdural hematoma.  For now continue with medical therapy and plan on repeat echo in 6 months.  2.  Aortic valve calcification with borderline stenosis.  Will check follow-up echo in 6 months  3.  Coronary artery calcification there is significant calcification present.  Has had some exertional fatigue.  Also just had a subdural hematoma.  Ideally would do a Lexiscan cardiac stress test and low-dose aspirin therapy however we will hold off on both neurosurgery.    4.  Hypertension.  Controlled on losartan.  Did ask her to start checking this more consistently at home.  5.  Reflux disease  6.  Pulmonary nodule.  She will follow-up with Dr. Whitney later this week regarding this.  7.  Nicotine use I recommended abstinence.  8.  Recent fall with subdural hematoma.  Reviewed prior recommendations [facility] neurosurgery while in hospital.  She was to follow-up with them with repeat CT  scan.  She will call Dr. Jefferson's office and we will also reach out.      Time Spent: I spent 55 minutes caring for Trice on this date of service. This time includes time spent by me in the following activities: preparing for the visit, reviewing tests, obtaining and/or reviewing a separately obtained history, performing a medically appropriate examination and/or evaluation, counseling and educating the patient/family/caregiver, ordering medications, tests, or procedures, documenting information in the medical record, independently interpreting results and communicating that information with the patient/family/caregiver, and care coordination.   I spent 1 minutes on the separately reported service of ECG. This time is not included in the time used to support the E/M service also reported today.        Your medication list            Accurate as of July 24, 2024 11:59 PM. If you have any questions, ask your nurse or doctor.                CONTINUE taking these medications        Instructions Last Dose Given Next Dose Due   clotrimazole-betamethasone 1-0.05 % cream  Commonly known as: LOTRISONE      APPLY TOPICALLY TO LEFT BREAST TWICE DAILY       colestipol 1 g tablet  Commonly known as: COLESTID      TAKE 1 TABLET BY MOUTH TWICE DAILY       diphenoxylate-atropine 2.5-0.025 MG per tablet  Commonly known as: LOMOTIL      Take 1 tablet by mouth 4 (Four) Times a Day As Needed.       famotidine 40 MG tablet  Commonly known as: PEPCID      Take 1 tablet by mouth Daily.       loperamide 2 MG tablet  Commonly known as: IMODIUM A-D      Take 1 tablet by mouth 3 (Three) Times a Day As Needed.       losartan 25 MG tablet  Commonly known as: COZAAR      TAKE 1 TABLET BY MOUTH DAILY       omeprazole 20 MG capsule  Commonly known as: priLOSEC      Take 1 capsule by mouth Daily.                Patient is no longer taking -.  I corrected the med list to reflect this.  I did not stop these medications.      Dictated utilizing Dragon  dictation

## 2024-07-25 ENCOUNTER — TELEPHONE (OUTPATIENT)
Dept: NEUROSURGERY | Facility: CLINIC | Age: 84
End: 2024-07-25
Payer: MEDICARE

## 2024-07-25 NOTE — TELEPHONE ENCOUNTER
Caller: PATIENT    Relationship: SELF    Best call back number: [phone]    What is the best time to reach you: ANYTIME    Who are you requesting to speak with (clinical staff, provider,  specific staff member): CLINICAL STAFF    Do you know the name of the person who called: NA    What was the call regarding: PATIENT CALLED AND STATES THAT HER CT IS SCHEDULED FOR 08/21/24 PT IS SCHEDULED TO SEE  FOR A 3 WEEK HOSPITAL FOLLOW UP-SENDING TO OFFICE TO REVIEW IF OK TO SCHEDULE PATIENT FOR AFTER IMAGING-PT ADVISED WE CAN SCHEDULE HER WHENEVER JUST CALL AND LET HER KNOW-SENDING TO OFFICE FOR SCHEDULING DUE TO TIMEFRAME OF IMAGING-PATIENT ASKING FOR A CALL BACK ONCE HER APPTS. HAVE BEEN SCHEDULED THANK YOU    Is it okay if the provider responds through [employer]: NO

## 2024-07-26 ENCOUNTER — OFFICE VISIT (OUTPATIENT)
Dept: INTERNAL MEDICINE | Facility: CLINIC | Age: 84
End: 2024-07-26
Payer: MEDICARE

## 2024-07-26 ENCOUNTER — HOSPITAL ENCOUNTER (OUTPATIENT)
Dept: CARDIOLOGY | Facility: HOSPITAL | Age: 84
Discharge: HOME | End: 2024-07-26

## 2024-07-26 VITALS
DIASTOLIC BLOOD PRESSURE: 68 MMHG | WEIGHT: 116 LBS | SYSTOLIC BLOOD PRESSURE: 111 MMHG | BODY MASS INDEX: 22.78 KG/M2 | HEART RATE: 90 BPM | HEIGHT: 60 IN

## 2024-07-26 VITALS
OXYGEN SATURATION: 98 % | WEIGHT: 117 LBS | HEIGHT: 60 IN | BODY MASS INDEX: 22.97 KG/M2 | HEART RATE: 74 BPM | SYSTOLIC BLOOD PRESSURE: 112 MMHG | DIASTOLIC BLOOD PRESSURE: 64 MMHG

## 2024-07-26 DIAGNOSIS — I10 HYPERTENSION, UNSPECIFIED TYPE: ICD-10-CM

## 2024-07-26 DIAGNOSIS — Z13.6 ENCOUNTER FOR SCREENING FOR VASCULAR DISEASE: ICD-10-CM

## 2024-07-26 DIAGNOSIS — S06.5XAA SUBDURAL HEMATOMA: ICD-10-CM

## 2024-07-26 DIAGNOSIS — E87.6 HYPOKALEMIA: ICD-10-CM

## 2024-07-26 DIAGNOSIS — S81.819A: Primary | ICD-10-CM

## 2024-07-26 DIAGNOSIS — S81.801D WOUND OF RIGHT LOWER EXTREMITY, SUBSEQUENT ENCOUNTER: Primary | ICD-10-CM

## 2024-07-26 LAB
BH CV ECHO MEAS - DIST AO DIAM: 1.42 CM
BH CV VAS BP LEFT ARM: NORMAL MMHG
BH CV VAS BP RIGHT ARM: NORMAL MMHG
BH CV VAS SCREENING CAROTID CCA LEFT: NORMAL CM/SEC
BH CV VAS SCREENING CAROTID CCA RIGHT: NORMAL CM/SEC
BH CV VAS SCREENING CAROTID ICA LEFT: NORMAL CM/SEC
BH CV VAS SCREENING CAROTID ICA RIGHT: NORMAL CM/SEC
BH CV XLRA MEAS - MID AO DIAM: 1.72 CM
BH CV XLRA MEAS - PAD LEFT ABI DP: 1.23
BH CV XLRA MEAS - PAD LEFT ABI PT: 1.21
BH CV XLRA MEAS - PAD LEFT ARM: 109 MMHG
BH CV XLRA MEAS - PAD LEFT LEG DP: 137 MMHG
BH CV XLRA MEAS - PAD LEFT LEG PT: 135 MMHG
BH CV XLRA MEAS - PAD RIGHT ABI DP: 1.29
BH CV XLRA MEAS - PAD RIGHT ABI PT: 1.33
BH CV XLRA MEAS - PAD RIGHT ARM: 111 MMHG
BH CV XLRA MEAS - PAD RIGHT LEG DP: 144 MMHG
BH CV XLRA MEAS - PAD RIGHT LEG PT: 148 MMHG
BH CV XLRA MEAS - PROX AO DIAM: 2.04 CM
BH CV XLRA MEAS LEFT ICA/CCA RATIO: 0.78
BH CV XLRA MEAS LEFT PROX ECA PSV: NORMAL CM/SEC
BH CV XLRA MEAS RIGHT ICA/CCA RATIO: 0.98
BH CV XLRA MEAS RIGHT PROX ECA PSV: NORMAL CM/SEC
MAXIMAL PREDICTED HEART RATE: 134 BPM
STRESS TARGET HR: 114 BPM

## 2024-07-26 PROCEDURE — 93799 UNLISTED CV SVC/PROCEDURE: CPT

## 2024-07-26 NOTE — PROGRESS NOTES
Chief Complaint   Patient presents with    Hospital Follow Up Visit     Slipper in shower and hit head also injured lower  rt Leg  Needs suture removed from above left eye brow    laceration    subdural hematoma    Hypertension    leg wound       History of Present Illness   Trice Melara is a 84 y.o. female presents for hospital follow up. Patient recently slipped in the shower and struck left forehead and lacerated right lower extremity. She notes that she had a foot stool in the bathroom. She did have a forehead laceration and subdural hematoma.     The following portions of the patient's history were reviewed and updated as appropriate: allergies, current medications, past family history, past medical history, past social history, past surgical history and problem list.  Current Outpatient Medications on File Prior to Visit   Medication Sig Dispense Refill    clotrimazole-betamethasone (LOTRISONE) 1-0.05 % cream APPLY TOPICALLY TO LEFT BREAST TWICE DAILY 30 g 2    colestipol (COLESTID) 1 g tablet TAKE 1 TABLET BY MOUTH TWICE DAILY 180 tablet 1    diphenoxylate-atropine (LOMOTIL) 2.5-0.025 MG per tablet Take 1 tablet by mouth 4 (Four) Times a Day As Needed.      famotidine (PEPCID) 40 MG tablet Take 1 tablet by mouth Daily. 90 tablet 3    loperamide (IMODIUM A-D) 2 MG tablet Take 1 tablet by mouth 3 (Three) Times a Day As Needed.      losartan (COZAAR) 25 MG tablet TAKE 1 TABLET BY MOUTH DAILY 30 tablet 5    omeprazole (priLOSEC) 20 MG capsule Take 1 capsule by mouth Daily.       No current facility-administered medications on file prior to visit.     Review of Systems   Constitutional: Negative.    HENT: Negative.     Eyes: Negative.    Respiratory: Negative.     Cardiovascular: Negative.    Gastrointestinal: Negative.    Endocrine: Negative.    Genitourinary: Negative.    Musculoskeletal: Negative.    Skin:  Positive for wound.   Allergic/Immunologic: Negative.    Neurological: Negative.    Hematological:  "Negative.    Psychiatric/Behavioral: Negative.         Objective   Physical Exam  Vitals and nursing note reviewed.   Constitutional:       Appearance: Normal appearance. She is well-developed.   HENT:      Head: Normocephalic and atraumatic.      Right Ear: Tympanic membrane and external ear normal.      Left Ear: Tympanic membrane and external ear normal.      Nose: Nose normal.      Mouth/Throat:      Mouth: Mucous membranes are moist.   Eyes:      Extraocular Movements: Extraocular movements intact.      Pupils: Pupils are equal, round, and reactive to light.   Cardiovascular:      Rate and Rhythm: Normal rate and regular rhythm.      Heart sounds: Normal heart sounds.   Pulmonary:      Effort: Pulmonary effort is normal. No respiratory distress.      Breath sounds: Normal breath sounds.   Abdominal:      General: Abdomen is flat.      Palpations: Abdomen is soft.   Musculoskeletal:         General: Normal range of motion.      Cervical back: Neck supple.   Skin:     General: Skin is warm and dry.      Comments: Forehead lesion w scabbing. Removed suture x 2      Neurological:      General: No focal deficit present.      Mental Status: She is alert and oriented to person, place, and time.   Psychiatric:         Mood and Affect: Mood normal.         Behavior: Behavior normal.         Thought Content: Thought content normal.         Judgment: Judgment normal.          /64   Pulse 74   Ht 151.1 cm (59.5\")   Wt 53.1 kg (117 lb)   SpO2 98%   BMI 23.24 kg/m²     Assessment & Plan   Diagnoses and all orders for this visit:    Wound of right lower extremity, subsequent encounter    Hypokalemia  -     Basic Metabolic Panel    Subdural hematoma    Hypertension, unspecified type    Patient w recent fall w subdural hematoma and leg wound. Have advised patient of fall precautions. She is to move difficult to reach items to a lower level. She is to avoid step stools that can fall and have railing on anything with a " step. She is advised to get a call alert button and brochures are given to her regarding this. She has a large wound anterior tibial region left leg. She is to keep area clean and use vaseline prior to placing on guaze. She is referred to wound care. Discussed negative impact of smoking on wound healing. Do not see cellulitis at this time but is to monitor for redness or changes. She may need abx. She is to f/u neurosurg regarding subdural heamtoma. She had hypokalemia and is to get repeat testing nonfasting next week. She has htn. She is advised to monitor bp for hypotension. May reduce med if needed. She will f/u here in one month for her routine evaluation.

## 2024-07-31 RX ORDER — MONTELUKAST SODIUM 4 MG/1
TABLET, CHEWABLE ORAL
Qty: 180 TABLET | Refills: 1 | Status: SHIPPED | OUTPATIENT
Start: 2024-07-31

## 2024-08-02 ENCOUNTER — LAB REQUISITION (OUTPATIENT)
Dept: LAB | Facility: HOSPITAL | Age: 84
End: 2024-08-02
Payer: MEDICARE

## 2024-08-02 ENCOUNTER — OFFICE VISIT (OUTPATIENT)
Dept: WOUND CARE | Facility: HOSPITAL | Age: 84
End: 2024-08-02
Payer: MEDICARE

## 2024-08-02 DIAGNOSIS — S81.811A LACERATION WITHOUT FOREIGN BODY, RIGHT LOWER LEG, INITIAL ENCOUNTER: ICD-10-CM

## 2024-08-02 PROCEDURE — 87205 SMEAR GRAM STAIN: CPT | Performed by: NURSE PRACTITIONER

## 2024-08-02 PROCEDURE — 87015 SPECIMEN INFECT AGNT CONCNTJ: CPT | Performed by: NURSE PRACTITIONER

## 2024-08-02 PROCEDURE — 87075 CULTR BACTERIA EXCEPT BLOOD: CPT | Performed by: NURSE PRACTITIONER

## 2024-08-02 PROCEDURE — G0463 HOSPITAL OUTPT CLINIC VISIT: HCPCS

## 2024-08-02 PROCEDURE — 87070 CULTURE OTHR SPECIMN AEROBIC: CPT | Performed by: NURSE PRACTITIONER

## 2024-08-02 PROCEDURE — 87186 SC STD MICRODIL/AGAR DIL: CPT | Performed by: NURSE PRACTITIONER

## 2024-08-02 PROCEDURE — 87147 CULTURE TYPE IMMUNOLOGIC: CPT | Performed by: NURSE PRACTITIONER

## 2024-08-04 LAB
BACTERIA SPEC AEROBE CULT: ABNORMAL
GRAM STN SPEC: ABNORMAL

## 2024-08-07 LAB — BACTERIA SPEC ANAEROBE CULT: NORMAL

## 2024-08-09 ENCOUNTER — OFFICE VISIT (OUTPATIENT)
Dept: WOUND CARE | Facility: HOSPITAL | Age: 84
End: 2024-08-09
Payer: MEDICARE

## 2024-08-13 ENCOUNTER — OFFICE VISIT (OUTPATIENT)
Dept: WOUND CARE | Facility: HOSPITAL | Age: 84
End: 2024-08-13
Payer: MEDICARE

## 2024-08-16 ENCOUNTER — OFFICE VISIT (OUTPATIENT)
Dept: WOUND CARE | Facility: HOSPITAL | Age: 84
End: 2024-08-16
Payer: MEDICARE

## 2024-08-21 ENCOUNTER — HOSPITAL ENCOUNTER (OUTPATIENT)
Dept: CT IMAGING | Facility: HOSPITAL | Age: 84
Discharge: HOME | End: 2024-08-21
Admitting: NURSE PRACTITIONER
Payer: MEDICARE

## 2024-08-21 DIAGNOSIS — S06.5XAA SUBDURAL HEMATOMA: ICD-10-CM

## 2024-08-21 PROCEDURE — 70450 CT HEAD/BRAIN W/O DYE: CPT

## 2024-08-21 NOTE — PROGRESS NOTES
Subjective   Patient ID: Trice Melara is a 84 y.o. female is here today for 3 week hospital follow-up with a new CT Head done on 8/21/2024    History of Present Illness    This patient returns today.  She feels great.  She really has no residual effects at all.    The following portions of the patient's history were reviewed and updated as appropriate: allergies, current medications, past family history, past medical history, past social history, past surgical history, and problem list.    Review of Systems   Eyes:  Negative for visual disturbance.   Neurological:  Negative for dizziness, light-headedness, numbness and headaches.       I reviewed the review of systems listed by the patient and discussed by my MA    Objective     There were no vitals filed for this visit.  There is no height or weight on file to calculate BMI.    Tobacco Use: High Risk (8/22/2024)    Patient History     Smoking Tobacco Use: Every Day     Smokeless Tobacco Use: Never     Passive Exposure: Current          Physical Exam  Neurological:      Mental Status: She is alert and oriented to person, place, and time.       Neurologic Exam     Mental Status   Oriented to person, place, and time.           Assessment & Plan   Independent Review of Radiographic Studies:      I personally reviewed the images from the following studies.    I reviewed her CT which was done yesterday.  This shows complete resolution of the subdural.  There is no evidence of a chronic subdural.    Medical Decision Making:      Patient from my point of view she can return to normal activities.  She is to call if any problems develop.    Diagnoses and all orders for this visit:    1. Subdural hematoma (Primary)      Return if symptoms worsen or fail to improve.

## 2024-08-22 ENCOUNTER — OFFICE VISIT (OUTPATIENT)
Dept: NEUROSURGERY | Facility: CLINIC | Age: 84
End: 2024-08-22
Payer: MEDICARE

## 2024-08-22 DIAGNOSIS — S06.5XAA SUBDURAL HEMATOMA: Primary | ICD-10-CM

## 2024-08-22 PROCEDURE — 99213 OFFICE O/P EST LOW 20 MIN: CPT | Performed by: NEUROLOGICAL SURGERY

## 2024-08-22 PROCEDURE — 1159F MED LIST DOCD IN RCRD: CPT | Performed by: NEUROLOGICAL SURGERY

## 2024-08-22 PROCEDURE — 1160F RVW MEDS BY RX/DR IN RCRD: CPT | Performed by: NEUROLOGICAL SURGERY

## 2024-08-23 ENCOUNTER — OFFICE VISIT (OUTPATIENT)
Dept: WOUND CARE | Facility: HOSPITAL | Age: 84
End: 2024-08-23
Payer: MEDICARE

## 2024-08-30 ENCOUNTER — OFFICE VISIT (OUTPATIENT)
Dept: WOUND CARE | Facility: HOSPITAL | Age: 84
End: 2024-08-30
Payer: MEDICARE

## 2024-09-06 ENCOUNTER — OFFICE VISIT (OUTPATIENT)
Dept: WOUND CARE | Facility: HOSPITAL | Age: 84
End: 2024-09-06
Payer: MEDICARE

## 2024-09-06 ENCOUNTER — TRANSCRIBE ORDERS (OUTPATIENT)
Dept: ADMINISTRATIVE | Facility: HOSPITAL | Age: 84
End: 2024-09-06
Payer: MEDICARE

## 2024-09-06 DIAGNOSIS — Z12.31 VISIT FOR SCREENING MAMMOGRAM: Primary | ICD-10-CM

## 2024-09-11 LAB
BUN SERPL-MCNC: 9 MG/DL (ref 8–23)
BUN/CREAT SERPL: 12.5 (ref 7–25)
CALCIUM SERPL-MCNC: 10.3 MG/DL (ref 8.6–10.5)
CHLORIDE SERPL-SCNC: 97 MMOL/L (ref 98–107)
CO2 SERPL-SCNC: 33 MMOL/L (ref 22–29)
CREAT SERPL-MCNC: 0.72 MG/DL (ref 0.57–1)
EGFRCR SERPLBLD CKD-EPI 2021: 81.5 ML/MIN/1.73
GLUCOSE SERPL-MCNC: 88 MG/DL (ref 65–99)
POTASSIUM SERPL-SCNC: 5.1 MMOL/L (ref 3.5–5.2)
SODIUM SERPL-SCNC: 138 MMOL/L (ref 136–145)

## 2024-09-13 ENCOUNTER — OFFICE VISIT (OUTPATIENT)
Dept: WOUND CARE | Facility: HOSPITAL | Age: 84
End: 2024-09-13
Payer: MEDICARE

## 2024-09-16 ENCOUNTER — OFFICE VISIT (OUTPATIENT)
Dept: INTERNAL MEDICINE | Facility: CLINIC | Age: 84
End: 2024-09-16
Payer: MEDICARE

## 2024-09-16 VITALS
HEART RATE: 87 BPM | BODY MASS INDEX: 22.38 KG/M2 | OXYGEN SATURATION: 97 % | HEIGHT: 60 IN | WEIGHT: 114 LBS | DIASTOLIC BLOOD PRESSURE: 70 MMHG | SYSTOLIC BLOOD PRESSURE: 106 MMHG

## 2024-09-16 DIAGNOSIS — M81.0 AGE-RELATED OSTEOPOROSIS WITHOUT CURRENT PATHOLOGICAL FRACTURE: ICD-10-CM

## 2024-09-16 DIAGNOSIS — F17.200 TOBACCO DEPENDENCE: ICD-10-CM

## 2024-09-16 DIAGNOSIS — I10 HYPERTENSION, UNSPECIFIED TYPE: Primary | ICD-10-CM

## 2024-09-16 PROCEDURE — G2211 COMPLEX E/M VISIT ADD ON: HCPCS | Performed by: INTERNAL MEDICINE

## 2024-09-16 PROCEDURE — 99214 OFFICE O/P EST MOD 30 MIN: CPT | Performed by: INTERNAL MEDICINE

## 2024-09-16 PROCEDURE — 1126F AMNT PAIN NOTED NONE PRSNT: CPT | Performed by: INTERNAL MEDICINE

## 2024-09-17 ENCOUNTER — TELEPHONE (OUTPATIENT)
Dept: INTERNAL MEDICINE | Facility: CLINIC | Age: 84
End: 2024-09-17
Payer: MEDICARE

## 2024-09-20 RX ORDER — ALENDRONATE SODIUM 70 MG/1
70 TABLET ORAL
Qty: 12 TABLET | Refills: 3 | Status: SHIPPED | OUTPATIENT
Start: 2024-09-20

## 2024-10-25 ENCOUNTER — OFFICE VISIT (OUTPATIENT)
Dept: CARDIOLOGY | Facility: CLINIC | Age: 84
End: 2024-10-25
Payer: MEDICARE

## 2024-10-25 ENCOUNTER — TELEPHONE (OUTPATIENT)
Dept: CARDIOLOGY | Facility: CLINIC | Age: 84
End: 2024-10-25

## 2024-10-25 ENCOUNTER — TELEPHONE (OUTPATIENT)
Dept: NEUROSURGERY | Facility: CLINIC | Age: 84
End: 2024-10-25
Payer: MEDICARE

## 2024-10-25 VITALS
WEIGHT: 116 LBS | BODY MASS INDEX: 23.39 KG/M2 | HEIGHT: 59 IN | HEART RATE: 91 BPM | DIASTOLIC BLOOD PRESSURE: 68 MMHG | SYSTOLIC BLOOD PRESSURE: 116 MMHG

## 2024-10-25 DIAGNOSIS — I35.0 NONRHEUMATIC AORTIC VALVE STENOSIS: ICD-10-CM

## 2024-10-25 DIAGNOSIS — I35.0 AORTIC STENOSIS, MILD: ICD-10-CM

## 2024-10-25 DIAGNOSIS — I25.10 CORONARY ARTERY CALCIFICATION: ICD-10-CM

## 2024-10-25 DIAGNOSIS — I34.0 NONRHEUMATIC MITRAL VALVE REGURGITATION: Primary | ICD-10-CM

## 2024-10-25 PROCEDURE — 1159F MED LIST DOCD IN RCRD: CPT | Performed by: INTERNAL MEDICINE

## 2024-10-25 PROCEDURE — 93000 ELECTROCARDIOGRAM COMPLETE: CPT | Performed by: INTERNAL MEDICINE

## 2024-10-25 PROCEDURE — 99214 OFFICE O/P EST MOD 30 MIN: CPT | Performed by: INTERNAL MEDICINE

## 2024-10-25 PROCEDURE — 1160F RVW MEDS BY RX/DR IN RCRD: CPT | Performed by: INTERNAL MEDICINE

## 2024-10-25 NOTE — TELEPHONE ENCOUNTER
Dr. Maddox office called wanted to see if patient can take baby asa 81 mg daily or ibuprofen? Office stated they will check our phone message for answer

## 2024-10-25 NOTE — PROGRESS NOTES
Date of Office Visit: 10/25/2024  Encounter Provider: Breana Maddox MD  Place of Service: [facility] CARDIOLOGY  Patient Name: Trice Melara  :1940    Chief complaint  Follow-up of mitral valve regurgitation, aortic valve stenosis and coronary artery disease with coronary calcificat    History of Present Illness  Patient is a 84-year-old female with history of gastric ulcer, peritonitis, hypertension and aspiration pneumonia. She is also recently noted on 2024 to have an abnormal echocardiogram.  This was performed due to murmur.  And showed an ejection fraction is 62%, grade 1A diastolic function aortic valve calcification with borderline aortic valve stenosis and severe mitral calcification with calcification of the leaflets and moderate-severe mitral valve regurgitation which is quite eccentric.  There is no significant stenosis.  RVSP was normal.  She was admitted on 2024 for subdural hematoma following a fall.  During the hospitalization she was also noted to have right pulmonary nodule.  Coronary artery calcification in multiple vessels also noted.  Patient had a vascular screening study on 2024 that showed carotid plaque without stenosis no aortic aneurysm or peripheral artery disease.    Since the last visit patient has not been exercising.  She denies any chest pain shortness of breath palpitations syncope near syncope.  Blood pressure at home is occasionally been higher up to 160s over 80s.  Heart rate also slightly elevated.  She admits this is not always taken after she is resting for a few minutes.  Patient has been taking ibuprofen at night.    Past Medical History:   Diagnosis Date    Abdominal wall dehiscence 2016    Fascial Dehiscence    Acute renal failure     Aspiration pneumonia     Colovaginal fistula 2004    Diverticulitis 2010    with paracolonic abscess and colovesical fistula,     Frequent loose stools     Gastric ulcer     GERD  (gastroesophageal reflux disease)     H/O Bowel perforation 2004    History of diverticular abscess     History of GI bleed 2010    Osteoarthritis     Peritonitis 01/2016    Septic shock     Small bowel anastomotic leak 01/08/2016     Past Surgical History:   Procedure Laterality Date    BREAST CYST EXCISION      COLON RESECTION  01/08/2016    Smal Bowel Resection -Dr. Syd Maharaj    COLON SURGERY Left 2010    with closure of fistula    EXPLORATORY LAPAROTOMY  12/31/2015    With Extensive Adhesiolysis of over 1 hour to allow conduct of the operation and repair of small intestine-Dr. Syd Maharaj    EXPLORATORY LAPAROTOMY  01/08/2016    Dr. Syd Maharaj    INCISIONAL HERNIA REPAIR  01/08/2016    Incisional Hernia Repair with Fascial Dehiscence Repair with Mesh and Bilateral Component Separation-Dr. Syd Maharaj    SD TX INTER/SD/SUBTRCHNTRIC FEM FX IMED IMPLTSCREW Right 7/24/2017    Procedure: FEMUR INTRAMEDULLARY NAILING/RODDING;  Surgeon: Huan Weiss MD;  Location: [facility] OR;  Service: Orthopedics    TOTAL ABDOMINAL HYSTERECTOMY  1970s    TOTAL KNEE ARTHROPLASTY Right 4/29/2019    Procedure: RT TOTAL KNEE ARTHROPLASTY WITH NAVIGATION;  Surgeon: Bolivar Glover MD;  Location: [facility] OR;  Service: Orthopedics    TOTAL KNEE ARTHROPLASTY Left 9/11/2019    Procedure: TOTAL KNEE ARTHROPLASTY;  Surgeon: Bolivar Glover MD;  Location: [facility] OR;  Service: Orthopedics     Outpatient Medications Prior to Visit   Medication Sig Dispense Refill    alendronate (Fosamax) 70 MG tablet Take 1 tablet by mouth Every 7 (Seven) Days. 12 tablet 3    clotrimazole-betamethasone (LOTRISONE) 1-0.05 % cream APPLY TOPICALLY TO LEFT BREAST TWICE DAILY 30 g 2    colestipol (COLESTID) 1 g tablet TAKE 1 TABLET BY MOUTH TWICE DAILY 180 tablet 1    diphenoxylate-atropine (LOMOTIL) 2.5-0.025 MG per tablet Take 1 tablet by mouth 4 (Four) Times a Day As Needed.      famotidine (PEPCID) 40 MG tablet Take 1 tablet by  mouth Daily. 90 tablet 3    loperamide (IMODIUM A-D) 2 MG tablet Take 1 tablet by mouth 3 (Three) Times a Day As Needed.      omeprazole (priLOSEC) 20 MG capsule Take 1 capsule by mouth Daily.       No facility-administered medications prior to visit.       Allergies as of 10/25/2024    (No Known Allergies)     Social History     Socioeconomic History    Marital status:    Tobacco Use    Smoking status: Every Day     Current packs/day: 0.25     Average packs/day: 0.3 packs/day for 50.0 years (12.5 ttl pk-yrs)     Types: Cigarettes     Passive exposure: Current    Smokeless tobacco: Never    Tobacco comments:     5-6 CIG DAY   Vaping Use    Vaping status: Never Used   Substance and Sexual Activity    Alcohol use: Yes     Alcohol/week: 2.0 standard drinks of alcohol     Types: 2 Drinks containing 0.5 oz of alcohol per week     Comment: 1-2 per evening    Drug use: No    Sexual activity: Not Currently     Partners: Male     Birth control/protection: Hysterectomy     Family History   Problem Relation Age of Onset    Dementia Mother     Thyroid disease Mother     Other Mother         carotid issues    Leukemia Father     Thyroid disease Brother     Thyroid disease Daughter     Malig Hyperthermia Neg Hx      Review of Systems   Constitutional: Negative for chills, fever, weight gain and weight loss.   Cardiovascular:  Negative for leg swelling.   Respiratory:  Negative for cough, snoring and wheezing.    Hematologic/Lymphatic: Negative for bleeding problem. Does not bruise/bleed easily.   Skin:  Negative for color change.   Musculoskeletal:  Negative for falls, joint pain and myalgias.   Gastrointestinal:  Negative for melena.   Genitourinary:  Negative for hematuria.   Neurological:  Negative for excessive daytime sleepiness.   Psychiatric/Behavioral:  Negative for depression. The patient is not nervous/anxious.         Objective:     Vitals:    10/25/24 1039   BP: 116/68   Pulse: 91   Weight: 52.6 kg (116 lb)  "  Height: 149.9 cm (59\")     Body mass index is 23.43 kg/m².    Vitals reviewed.   Constitutional:       Appearance: Well-developed.   Eyes:      General: No scleral icterus.        Right eye: No discharge.      Conjunctiva/sclera: Conjunctivae normal.      Pupils: Pupils are equal, round, and reactive to light.   HENT:      Head: Normocephalic.      Nose: Nose normal.   Neck:      Thyroid: No thyromegaly.      Vascular: No JVD.   Pulmonary:      Effort: Pulmonary effort is normal. No respiratory distress.      Breath sounds: Normal breath sounds. No wheezing. No rales.   Cardiovascular:      Normal rate. Regular rhythm. Normal S1. Normal S2.       Murmurs:  There is a 2/6 systolic ejection murmur at the right and left upper sternal border radiating to the carotids consistent with aortic valve stenosis.  There is a second 2-3/6 holosystolic murmur noted the apex into the left lower sternal border      No gallop.    Pulses:     Intact distal pulses.      Carotid: 2+ bilaterally.     Radial: 2+ bilaterally.     Femoral: 2+ bilaterally.     Popliteal: 2+ bilaterally.     Dorsalis pedis: 2+ bilaterally.     Posterior tibial: 2+ bilaterally.  Edema:     Peripheral edema absent.   Abdominal:      General: Bowel sounds are normal. There is no distension.      Palpations: Abdomen is soft.      Tenderness: There is no abdominal tenderness. There is no rebound.   Musculoskeletal: Normal range of motion.         General: No tenderness.      Cervical back: Normal range of motion and neck supple. Skin:     General: Skin is warm and dry.      Findings: No erythema or rash.   Neurological:      Mental Status: Alert and oriented to person, place, and time.   Psychiatric:         Behavior: Behavior normal.         Thought Content: Thought content normal.         Judgment: Judgment normal.       Lab Review:   Lab Results - Last 18 Months   Lab Units 07/18/24  0418 07/17/24  0511 07/16/24  1945 03/11/24  1011   WBC 10*3/mm3 6.55 6.93  "  < > 8.10   RBC 10*6/mm3 4.47 4.71   < > 5.14   HEMOGLOBIN g/dL 12.6 13.1   < > 14.0   HEMATOCRIT % 38.3 40.2   < > 43.7   MCV fL 85.7 85.4   < > 85.0   MCH pg 28.2 27.8   < > 27.2   MCHC g/dL 32.9 32.6   < > 32.0   RDW % 12.8 12.7   < > 12.1*   PLATELETS 10*3/mm3 146 171   < > 171   NEUTROPHIL % % 59.0 61.0   < > 71.0   LYMPHOCYTE % % 22.3 21.8   < > 16.2*   MONOCYTES % % 12.8* 12.8*   < > 7.8   EOSINOPHIL % % 4.4 2.9   < > 3.6   BASOPHIL % % 1.2 1.2   < > 1.0   NEUTROS ABS 10*3/mm3 3.86 4.23   < > 5.76   LYMPHS ABS 10*3/mm3 1.46 1.51   < > 1.31   MONOS ABS 10*3/mm3 0.84 0.89   < > 0.63   EOS ABS 10*3/mm3 0.29 0.20   < > 0.29   BASOS ABS 10*3/mm3 0.08 0.08   < > 0.08   IMM GRAN % %  --   --   --  0.4   IMMATURE GRANS (ABS) 10*3/mm3  --   --   --  0.03   RDW-SD fl 39.4 39.0   < >  --    MPV fL 10.9 11.3   < >  --     < > = values in this interval not displayed.       Lab Results - Last 18 Months   Lab Units 09/11/24  0917 07/18/24  0417 07/17/24  0511 07/16/24 1945 07/16/24 1945 03/11/24  1011   GLUCOSE mg/dL 88 103* 99   < > 118* 98   BUN mg/dL 9 7* 6*   < > 7* 10   CREATININE mg/dL 0.72 0.52* 0.66   < > 0.61 0.79   SODIUM mmol/L 138 139 143   < > 140 143   POTASSIUM mmol/L 5.1 3.6 3.9   < > 2.8* 3.9   CHLORIDE mmol/L 97* 105 104   < > 99 102   CO2 mmol/L 33.0* 26.0 28.8   < > 29.0 28.5   CALCIUM mg/dL 10.3 8.5* 8.8   < > 9.6 9.6   TOTAL PROTEIN g/dL  --   --   --   --  7.0  --    ALBUMIN g/dL  --   --   --   --  4.3 4.4   ALT (SGPT) U/L  --   --   --   --  20 13   AST (SGOT) U/L  --   --   --   --  37* 18   ALK PHOS U/L  --   --   --   --  71 77   BILIRUBIN mg/dL  --   --   --   --  0.7 0.6   GLOBULIN gm/dL  --   --   --   --  2.7  --    A/G RATIO g/dL  --   --   --   --  1.6  --    BUN / CREAT RATIO  12.5 13.5 9.1   < > 11.5 12.7   ANION GAP mmol/L  --  8.0 10.2   < > 12.0  --    EGFR mL/min/1.73  --  90.6 85.6   < > 87.2  --     < > = values in this interval not displayed.     Lab Results - Last 18 Months    Lab Units 07/17/24  0511 03/11/24  1011   CHOLESTEROL mg/dL 160  --    TRIGLYCERIDES mg/dL 62 90   HDL CHOL mg/dL 70* 69*   LDL CHOL mg/dL 78 83   VLDL CHOL mg/dL 12  --    VLDL CHOLESTEROL CHRIS mg/dL  --  16   LDL/HDL RATIO  1.11  --      Lab Results - Last 18 Months   Lab Units 03/11/24  1011   TSH uIU/mL 2.690     Lab Results - Last 18 Months   Lab Units 07/16/24  1945   PROTIME Seconds 13.0   APTT seconds 26.0       ECG 12 Lead    Date/Time: 10/25/2024 10:52 AM  Performed by: Breana Maddox MD    Authorized by: Breana Maddox MD  Comparison: compared with previous ECG   Comparison to previous ECG: PVC is present  Rhythm: sinus rhythm  Other findings: non-specific ST-T wave changes    Clinical impression: abnormal EKG            Diagnosis Plan   1. Nonrheumatic mitral valve regurgitation  Adult Transthoracic Echo Complete W/ Cont if Necessary Per Protocol      2. Aortic stenosis, mild  Adult Transthoracic Echo Complete W/ Cont if Necessary Per Protocol      3. Nonrheumatic aortic valve stenosis        4. Coronary artery calcification          Plan:       1.  Mitral calcification.  No anginal symptoms.  Will try to clarify with neurosurgery if she can take low-dose aspirin therapy.  Will need to stop ibuprofen.  2.  Moderate-severe mitral regurgitation.  Clinically stable.  Plan on repeat echo in 3 to 4 months.  Of note she states that even if this progresses she may likely not pursue further intervention as she is still coping with the loss of her  of 61 years.  2.  Aortic valve calcification with borderline stenosis.  As above.  3.  Coronary artery calcification there is significant calcification present.  Has had some exertional fatigue.  Hematoma resolved on recent CT imaging.  Patient denies any anginal symptoms and prefers to not pursue further testing if possible.  Will check with Dr. Hidalgo about use of low-dose aspirin therapy  4.  Hypertension.  Stable on the current regimen with isolated elevated  readings.  She will start checking this on a more consistent manner  5.  Reflux disease  6.  Pulmonary nodule.  She will follow-up with Dr. Whitney  8.  Fall with subdural hematoma.  Repeat CT scan showed complete resolution by 8/2024 no additional follow-up recommended by neurosurgery.  Clarify use of aspirin and nonsteroidals with Dr. Hidalgo      Time Spent: I spent 35 minutes caring for Trice on this date of service. This time includes time spent by me in the following activities: preparing for the visit, reviewing tests, obtaining and/or reviewing a separately obtained history, performing a medically appropriate examination and/or evaluation, counseling and educating the patient/family/caregiver, ordering medications, tests, or procedures, documenting information in the medical record, independently interpreting results and communicating that information with the patient/family/caregiver, and care coordination.   I spent 1 minutes on the separately reported service of ECG. This time is not included in the time used to support the E/M service also reported today.        Your medication list            Accurate as of October 25, 2024 11:59 PM. If you have any questions, ask your nurse or doctor.                CONTINUE taking these medications        Instructions Last Dose Given Next Dose Due   alendronate 70 MG tablet  Commonly known as: Fosamax      Take 1 tablet by mouth Every 7 (Seven) Days.       clotrimazole-betamethasone 1-0.05 % cream  Commonly known as: LOTRISONE      APPLY TOPICALLY TO LEFT BREAST TWICE DAILY       colestipol 1 g tablet  Commonly known as: COLESTID      TAKE 1 TABLET BY MOUTH TWICE DAILY       diphenoxylate-atropine 2.5-0.025 MG per tablet  Commonly known as: LOMOTIL      Take 1 tablet by mouth 4 (Four) Times a Day As Needed.       famotidine 40 MG tablet  Commonly known as: PEPCID      Take 1 tablet by mouth Daily.       loperamide 2 MG tablet  Commonly known as: IMODIUM A-D      Take 1  tablet by mouth 3 (Three) Times a Day As Needed.       omeprazole 20 MG capsule  Commonly known as: priLOSEC      Take 1 capsule by mouth Daily.                Patient is no longer taking -.  I corrected the med list to reflect this.  I did not stop these medications.      Dictated utilizing Dragon dictation

## 2024-10-25 NOTE — TELEPHONE ENCOUNTER
Spoke with Dr Stern's office.  They will find out and send a message in EPIC.  I will check in a few days.

## 2024-10-28 PROBLEM — I25.10 CORONARY ARTERY CALCIFICATION: Status: ACTIVE | Noted: 2024-10-28

## 2024-10-28 PROBLEM — I35.0 AORTIC STENOSIS: Status: ACTIVE | Noted: 2024-10-28

## 2024-10-28 PROBLEM — I34.0 NONRHEUMATIC MITRAL VALVE REGURGITATION: Status: ACTIVE | Noted: 2024-10-28

## 2024-10-29 ENCOUNTER — TELEPHONE (OUTPATIENT)
Dept: INTERNAL MEDICINE | Facility: CLINIC | Age: 84
End: 2024-10-29
Payer: MEDICARE

## 2024-10-29 ENCOUNTER — HOSPITAL ENCOUNTER (OUTPATIENT)
Dept: MAMMOGRAPHY | Facility: HOSPITAL | Age: 84
Discharge: HOME | End: 2024-10-29
Admitting: INTERNAL MEDICINE
Payer: MEDICARE

## 2024-10-29 DIAGNOSIS — Z12.31 VISIT FOR SCREENING MAMMOGRAM: ICD-10-CM

## 2024-10-29 PROCEDURE — 77063 BREAST TOMOSYNTHESIS BI: CPT

## 2024-10-29 PROCEDURE — 77067 SCR MAMMO BI INCL CAD: CPT

## 2024-10-29 NOTE — TELEPHONE ENCOUNTER
Pt informed    ----- Message from Maia Whitney sent at 10/29/2024 11:35 AM EDT -----  Mammogram isnormal/ negative. Patient may continue monthly self breast examinations w annual mammography as desired.   santi

## 2024-10-30 NOTE — TELEPHONE ENCOUNTER
Please let patient know I really appreciated the cartoon she sent.  Also we heard back from Dr. Stern and cleared her to start low-dose aspirin therapy.  [place] have her start enteric-coated aspirin 81 mg a day with food.

## 2024-11-15 ENCOUNTER — TELEPHONE (OUTPATIENT)
Dept: CARDIOLOGY | Facility: CLINIC | Age: 84
End: 2024-11-15
Payer: MEDICARE

## 2024-11-15 ENCOUNTER — HOSPITAL ENCOUNTER (OUTPATIENT)
Dept: CARDIOLOGY | Facility: HOSPITAL | Age: 84
Discharge: HOME | End: 2024-11-15
Payer: MEDICARE

## 2024-11-15 VITALS
SYSTOLIC BLOOD PRESSURE: 156 MMHG | HEART RATE: 88 BPM | HEIGHT: 59 IN | WEIGHT: 116 LBS | BODY MASS INDEX: 23.39 KG/M2 | DIASTOLIC BLOOD PRESSURE: 84 MMHG

## 2024-11-15 DIAGNOSIS — I34.0 NONRHEUMATIC MITRAL VALVE REGURGITATION: ICD-10-CM

## 2024-11-15 DIAGNOSIS — I35.0 AORTIC STENOSIS, MILD: ICD-10-CM

## 2024-11-15 LAB
AORTIC ARCH: 2 CM
ASCENDING AORTA: 2.9 CM
BH CV ECHO LEFT VENTRICLE GLOBAL LONGITUDINAL STRAIN: -20.6 %
BH CV ECHO MEAS - ACS: 1.53 CM
BH CV ECHO MEAS - AI P1/2T: 328.8 MSEC
BH CV ECHO MEAS - AO MAX PG: 12 MMHG
BH CV ECHO MEAS - AO MEAN PG: 7 MMHG
BH CV ECHO MEAS - AO ROOT DIAM: 2.7 CM
BH CV ECHO MEAS - AO V2 MAX: 173 CM/SEC
BH CV ECHO MEAS - AO V2 VTI: 36.2 CM
BH CV ECHO MEAS - AVA(I,D): 1.35 CM2
BH CV ECHO MEAS - EDV(CUBED): 110.6 ML
BH CV ECHO MEAS - EDV(MOD-SP2): 52 ML
BH CV ECHO MEAS - EDV(MOD-SP4): 53 ML
BH CV ECHO MEAS - EF(MOD-BP): 63.3 %
BH CV ECHO MEAS - EF(MOD-SP2): 65.4 %
BH CV ECHO MEAS - EF(MOD-SP4): 62.3 %
BH CV ECHO MEAS - ESV(CUBED): 25 ML
BH CV ECHO MEAS - ESV(MOD-SP2): 18 ML
BH CV ECHO MEAS - ESV(MOD-SP4): 20 ML
BH CV ECHO MEAS - FS: 39.1 %
BH CV ECHO MEAS - IVS/LVPW: 1 CM
BH CV ECHO MEAS - IVSD: 0.9 CM
BH CV ECHO MEAS - LAT PEAK E' VEL: 15.2 CM/SEC
BH CV ECHO MEAS - LV DIASTOLIC VOL/BSA (35-75): 36.2 CM2
BH CV ECHO MEAS - LV MASS(C)D: 147.8 GRAMS
BH CV ECHO MEAS - LV MAX PG: 2.8 MMHG
BH CV ECHO MEAS - LV MEAN PG: 1 MMHG
BH CV ECHO MEAS - LV SYSTOLIC VOL/BSA (12-30): 13.7 CM2
BH CV ECHO MEAS - LV V1 MAX: 83.3 CM/SEC
BH CV ECHO MEAS - LV V1 VTI: 16.1 CM
BH CV ECHO MEAS - LVIDD: 4.8 CM
BH CV ECHO MEAS - LVIDS: 2.9 CM
BH CV ECHO MEAS - LVOT AREA: 3 CM2
BH CV ECHO MEAS - LVOT DIAM: 1.97 CM
BH CV ECHO MEAS - LVPWD: 0.9 CM
BH CV ECHO MEAS - MED PEAK E' VEL: 8.4 CM/SEC
BH CV ECHO MEAS - MR MAX PG: 205 MMHG
BH CV ECHO MEAS - MR MAX VEL: 716 CM/SEC
BH CV ECHO MEAS - MV A DUR: 0.1 SEC
BH CV ECHO MEAS - MV A MAX VEL: 107 CM/SEC
BH CV ECHO MEAS - MV DEC SLOPE: 479.6 CM/SEC2
BH CV ECHO MEAS - MV DEC TIME: 0.16 SEC
BH CV ECHO MEAS - MV E MAX VEL: 102 CM/SEC
BH CV ECHO MEAS - MV E/A: 0.95
BH CV ECHO MEAS - MV MAX PG: 3.6 MMHG
BH CV ECHO MEAS - MV MEAN PG: 2.16 MMHG
BH CV ECHO MEAS - MV P1/2T: 62.1 MSEC
BH CV ECHO MEAS - MV V2 VTI: 23.6 CM
BH CV ECHO MEAS - MVA(P1/2T): 3.5 CM2
BH CV ECHO MEAS - MVA(VTI): 2.07 CM2
BH CV ECHO MEAS - PA ACC TIME: 0.11 SEC
BH CV ECHO MEAS - PA V2 MAX: 90.3 CM/SEC
BH CV ECHO MEAS - PULM A REVS DUR: 0.1 SEC
BH CV ECHO MEAS - PULM A REVS VEL: 40.9 CM/SEC
BH CV ECHO MEAS - PULM DIAS VEL: 47.2 CM/SEC
BH CV ECHO MEAS - PULM S/D: 1.29
BH CV ECHO MEAS - PULM SYS VEL: 60.8 CM/SEC
BH CV ECHO MEAS - QP/QS: 0.45
BH CV ECHO MEAS - RV MAX PG: 1.28 MMHG
BH CV ECHO MEAS - RV V1 MAX: 56.6 CM/SEC
BH CV ECHO MEAS - RV V1 VTI: 10.7 CM
BH CV ECHO MEAS - RVOT DIAM: 1.61 CM
BH CV ECHO MEAS - SUP REN AO DIAM: 1.7 CM
BH CV ECHO MEAS - SV(LVOT): 48.9 ML
BH CV ECHO MEAS - SV(MOD-SP2): 34 ML
BH CV ECHO MEAS - SV(MOD-SP4): 33 ML
BH CV ECHO MEAS - SV(RVOT): 21.8 ML
BH CV ECHO MEAS - SVI(LVOT): 33.5 ML/M2
BH CV ECHO MEAS - SVI(MOD-SP2): 23.2 ML/M2
BH CV ECHO MEAS - SVI(MOD-SP4): 22.6 ML/M2
BH CV ECHO MEAS - TAPSE (>1.6): 2.22 CM
BH CV ECHO MEASUREMENTS AVERAGE E/E' RATIO: 8.64
BH CV XLRA - RV BASE: 2.45 CM
BH CV XLRA - RV LENGTH: 6.2 CM
BH CV XLRA - RV MID: 1.78 CM
BH CV XLRA - TDI S': 14.5 CM/SEC
LEFT ATRIUM VOLUME INDEX: 43.6 ML/M2
SINUS: 2.8 CM
STJ: 2.26 CM

## 2024-11-15 PROCEDURE — 93306 TTE W/DOPPLER COMPLETE: CPT

## 2024-11-15 NOTE — TELEPHONE ENCOUNTER
Please let her know that echo is stable.  Her heart is strong and functioning well.  Aortic valve looks about the same as it did in June.  Mitral valve is also about the same still a very significant amount of leakage.  Blood pressure was significantly elevated during echocardiogram.  Would make sure she is checking blood pressure at home.  She is currently not on any blood pressure medication and may benefit from antihypertensive therapy if blood pressure consistently greater than 130/80.

## 2024-11-18 NOTE — TELEPHONE ENCOUNTER
Called and left VM, will continue to try to reach pt.    HUB- please put patient straight through to triage    Brittny Haley RN  [place] Nurse[place] [facility]  11/18/24 08:35 EST

## 2024-11-18 NOTE — TELEPHONE ENCOUNTER
Pt called back. Went over results and recommendations. Pt states that she has been taking her B/P at home and she has not seen systolic readings that are above 140. She does have losartan that she was prescribed to take if her systolic does go above that. I asked her to keep a log of her B/P and HRs. Then, send them in in a week or two. She verbalized understanding.    Thank you,    Tisha Ruiz, RN  Triage [facility] [place]  11/18/24 09:34 EST

## 2024-12-02 RX ORDER — LOSARTAN POTASSIUM 25 MG/1
25 TABLET ORAL DAILY
Qty: 30 TABLET | Refills: 5 | OUTPATIENT
Start: 2024-12-02

## 2025-02-18 RX ORDER — COLESTIPOL HYDROCHLORIDE 1 G/1
TABLET ORAL
Qty: 180 TABLET | Refills: 1 | Status: SHIPPED | OUTPATIENT
Start: 2025-02-18

## 2025-03-06 DIAGNOSIS — M81.0 AGE-RELATED OSTEOPOROSIS WITHOUT CURRENT PATHOLOGICAL FRACTURE: ICD-10-CM

## 2025-03-06 DIAGNOSIS — N28.9 ABNORMAL KIDNEY FUNCTION: ICD-10-CM

## 2025-03-06 DIAGNOSIS — I25.10 CORONARY ARTERY CALCIFICATION: ICD-10-CM

## 2025-03-06 DIAGNOSIS — E78.1 HYPERTRIGLYCERIDEMIA: Primary | ICD-10-CM

## 2025-03-13 RX ORDER — FAMOTIDINE 40 MG/1
40 TABLET, FILM COATED ORAL DAILY
Qty: 90 TABLET | Refills: 3 | Status: SHIPPED | OUTPATIENT
Start: 2025-03-13

## 2025-03-16 ENCOUNTER — RESULTS FOLLOW-UP (OUTPATIENT)
Dept: INTERNAL MEDICINE | Facility: CLINIC | Age: 85
End: 2025-03-16
Payer: MEDICARE

## 2025-03-16 LAB
ALBUMIN SERPL-MCNC: 4.2 G/DL (ref 3.5–5.2)
ALBUMIN/GLOB SERPL: 1.6 G/DL
ALP SERPL-CCNC: 81 U/L (ref 39–117)
ALT SERPL-CCNC: 16 U/L (ref 1–33)
APPEARANCE UR: CLEAR
AST SERPL-CCNC: 24 U/L (ref 1–32)
BACTERIA #/AREA URNS HPF: ABNORMAL /[HPF]
BACTERIA UR CULT: ABNORMAL
BACTERIA UR CULT: ABNORMAL
BASOPHILS # BLD AUTO: 0.1 10*3/MM3 (ref 0–0.2)
BASOPHILS NFR BLD AUTO: 1.7 % (ref 0–1.5)
BILIRUB SERPL-MCNC: 0.6 MG/DL (ref 0–1.2)
BILIRUB UR QL STRIP: NEGATIVE
BUN SERPL-MCNC: 8 MG/DL (ref 8–23)
BUN/CREAT SERPL: 10.7 (ref 7–25)
CALCIUM SERPL-MCNC: 9.5 MG/DL (ref 8.6–10.5)
CASTS URNS QL MICRO: ABNORMAL /LPF
CHLORIDE SERPL-SCNC: 99 MMOL/L (ref 98–107)
CHOLEST SERPL-MCNC: 191 MG/DL (ref 0–200)
CO2 SERPL-SCNC: 30.4 MMOL/L (ref 22–29)
COLOR UR: YELLOW
CREAT SERPL-MCNC: 0.75 MG/DL (ref 0.57–1)
EGFRCR SERPLBLD CKD-EPI 2021: 77.6 ML/MIN/1.73
EOSINOPHIL # BLD AUTO: 0.35 10*3/MM3 (ref 0–0.4)
EOSINOPHIL NFR BLD AUTO: 5.9 % (ref 0.3–6.2)
EPI CELLS #/AREA URNS HPF: ABNORMAL /HPF (ref 0–10)
ERYTHROCYTE [DISTWIDTH] IN BLOOD BY AUTOMATED COUNT: 13.2 % (ref 12.3–15.4)
GLOBULIN SER CALC-MCNC: 2.6 GM/DL
GLUCOSE SERPL-MCNC: 93 MG/DL (ref 65–99)
GLUCOSE UR QL STRIP: NEGATIVE
HCT VFR BLD AUTO: 43.5 % (ref 34–46.6)
HDLC SERPL-MCNC: 65 MG/DL (ref 40–60)
HGB BLD-MCNC: 14.4 G/DL (ref 12–15.9)
HGB UR QL STRIP: NEGATIVE
IMM GRANULOCYTES # BLD AUTO: 0.03 10*3/MM3 (ref 0–0.05)
IMM GRANULOCYTES NFR BLD AUTO: 0.5 % (ref 0–0.5)
KETONES UR QL STRIP: NEGATIVE
LDLC SERPL CALC-MCNC: 110 MG/DL (ref 0–100)
LEUKOCYTE ESTERASE UR QL STRIP: ABNORMAL
LYMPHOCYTES # BLD AUTO: 1.59 10*3/MM3 (ref 0.7–3.1)
LYMPHOCYTES NFR BLD AUTO: 26.6 % (ref 19.6–45.3)
MCH RBC QN AUTO: 27.4 PG (ref 26.6–33)
MCHC RBC AUTO-ENTMCNC: 33.1 G/DL (ref 31.5–35.7)
MCV RBC AUTO: 82.9 FL (ref 79–97)
MICRO URNS: ABNORMAL
MONOCYTES # BLD AUTO: 0.68 10*3/MM3 (ref 0.1–0.9)
MONOCYTES NFR BLD AUTO: 11.4 % (ref 5–12)
NEUTROPHILS # BLD AUTO: 3.23 10*3/MM3 (ref 1.7–7)
NEUTROPHILS NFR BLD AUTO: 53.9 % (ref 42.7–76)
NITRITE UR QL STRIP: POSITIVE
NRBC BLD AUTO-RTO: 0 /100 WBC (ref 0–0.2)
OTHER ANTIBIOTIC SUSC ISLT: ABNORMAL
PH UR STRIP: 6.5 [PH] (ref 5–7.5)
PLATELET # BLD AUTO: 228 10*3/MM3 (ref 140–450)
POTASSIUM SERPL-SCNC: 3.7 MMOL/L (ref 3.5–5.2)
PROT SERPL-MCNC: 6.8 G/DL (ref 6–8.5)
PROT UR QL STRIP: NEGATIVE
RBC # BLD AUTO: 5.25 10*6/MM3 (ref 3.77–5.28)
RBC #/AREA URNS HPF: ABNORMAL /HPF (ref 0–2)
SODIUM SERPL-SCNC: 142 MMOL/L (ref 136–145)
SP GR UR STRIP: 1.01 (ref 1–1.03)
TRIGL SERPL-MCNC: 86 MG/DL (ref 0–150)
TSH SERPL DL<=0.005 MIU/L-ACNC: 2.35 UIU/ML (ref 0.27–4.2)
URINALYSIS REFLEX: ABNORMAL
UROBILINOGEN UR STRIP-MCNC: 0.2 MG/DL (ref 0.2–1)
VLDLC SERPL CALC-MCNC: 16 MG/DL (ref 5–40)
WBC # BLD AUTO: 5.98 10*3/MM3 (ref 3.4–10.8)
WBC #/AREA URNS HPF: ABNORMAL /HPF (ref 0–5)

## 2025-03-16 RX ORDER — CEPHALEXIN 500 MG/1
500 CAPSULE ORAL 4 TIMES DAILY
Qty: 14 CAPSULE | Refills: 0 | Status: SHIPPED | OUTPATIENT
Start: 2025-03-16

## 2025-03-17 ENCOUNTER — OFFICE VISIT (OUTPATIENT)
Dept: INTERNAL MEDICINE | Facility: CLINIC | Age: 85
End: 2025-03-17
Payer: MEDICARE

## 2025-03-17 VITALS
SYSTOLIC BLOOD PRESSURE: 156 MMHG | DIASTOLIC BLOOD PRESSURE: 88 MMHG | BODY MASS INDEX: 23.56 KG/M2 | OXYGEN SATURATION: 98 % | WEIGHT: 120 LBS | HEIGHT: 60 IN | HEART RATE: 81 BPM

## 2025-03-17 DIAGNOSIS — F17.210 CIGARETTE SMOKER: ICD-10-CM

## 2025-03-17 DIAGNOSIS — M85.80 OSTEOPENIA, UNSPECIFIED LOCATION: ICD-10-CM

## 2025-03-17 DIAGNOSIS — K21.9 GASTROESOPHAGEAL REFLUX DISEASE, UNSPECIFIED WHETHER ESOPHAGITIS PRESENT: ICD-10-CM

## 2025-03-17 DIAGNOSIS — Z12.2 SCREENING FOR LUNG CANCER: ICD-10-CM

## 2025-03-17 DIAGNOSIS — Z78.0 MENOPAUSE: ICD-10-CM

## 2025-03-17 DIAGNOSIS — Z00.00 HEALTHCARE MAINTENANCE: Primary | ICD-10-CM

## 2025-03-17 RX ORDER — LOSARTAN POTASSIUM 25 MG/1
1 TABLET ORAL DAILY
COMMUNITY
Start: 2024-11-23

## 2025-03-17 NOTE — TELEPHONE ENCOUNTER
Patient has a UTI.   A rx for keflex one tablet twice daily is sent to patient's pharmacy.   GUSTAVO    Pt told at [facility] appt

## 2025-03-17 NOTE — PROGRESS NOTES
Subjective   The ABCs of the Annual Wellness Visit  Medicare Wellness Visit      Trice Melara is a 84 y.o. patient who presents for a Medicare Wellness Visit.    The following portions of the patient's history were reviewed and   updated as appropriate: allergies, current medications, past family history, past medical history, past social history, past surgical history, and problem list.    Compared to one year ago, the patient's physical   health is the same.  Compared to one year ago, the patient's mental   health is the same.    Recent Hospitalizations:  This patient has had a [facility] admission record on file within the last 365 days.  Current Medical Providers:  Patient Care Team:  Maia Whitney MD as PCP - General (Internal Medicine)  Rey Madrid MD as Consulting Physician (Hematology and Oncology)  Bolivar Glover MD as Surgeon (Orthopedic Surgery)  Ankit Stern MD as Surgeon (Neurosurgery)    Outpatient Medications Prior to Visit   Medication Sig Dispense Refill    alendronate (Fosamax) 70 MG tablet Take 1 tablet by mouth Every 7 (Seven) Days. 12 tablet 3    cephalexin (KEFLEX) 500 MG capsule Take 1 capsule by mouth 4 (Four) Times a Day. 14 capsule 0    colestipol (COLESTID) 1 g tablet TAKE 1 TABLET BY MOUTH TWICE DAILY 180 tablet 1    diphenoxylate-atropine (LOMOTIL) 2.5-0.025 MG per tablet Take 1 tablet by mouth 4 (Four) Times a Day As Needed.      famotidine (PEPCID) 40 MG tablet TAKE 1 TABLET BY MOUTH DAILY 90 tablet 3    loperamide (IMODIUM A-D) 2 MG tablet Take 1 tablet by mouth 3 (Three) Times a Day As Needed.      losartan (COZAAR) 25 MG tablet Take 1 tablet by mouth Daily.      omeprazole (priLOSEC) 20 MG capsule Take 1 capsule by mouth Daily.      clotrimazole-betamethasone (LOTRISONE) 1-0.05 % cream APPLY TOPICALLY TO LEFT BREAST TWICE DAILY 30 g 2     No facility-administered medications prior to visit.     No opioid medication identified on active medication list. I have  "reviewed chart for other potential  high risk medication/s and harmful drug interactions in the elderly.      Aspirin is not on active medication list.  Aspirin use is not indicated based on review of current medical condition/s. Risk of harm outweighs potential benefits.  .    Patient Active Problem List   Diagnosis    Gastroesophageal reflux disease    Abnormal kidney function    Primary osteoarthritis of knees, bilateral    Closed displaced intertrochanteric fracture of right femur    Osteoporosis    Status post right knee replacement    Hypertriglyceridemia    Subdural hematoma    Aortic stenosis    Nonrheumatic mitral valve regurgitation    Coronary artery calcification     Advance Care Planning Advance Directive is on file.  ACP discussion was held with the patient during this visit. Patient has an advance directive in EMR which is still valid.             Objective   Vitals:    03/17/25 1106   BP: 156/88   Pulse: 81   SpO2: 98%   Weight: 54.4 kg (120 lb)   Height: 152.4 cm (60\")   PainSc: 0-No pain       Estimated body mass index is 23.44 kg/m² as calculated from the following:    Height as of this encounter: 152.4 cm (60\").    Weight as of this encounter: 54.4 kg (120 lb).    BMI is within normal parameters. No other follow-up for BMI required.           Does the patient have evidence of cognitive impairment? No  Lab Results   Component Value Date    CHLPL 191 03/12/2025    TRIG 86 03/12/2025    HDL 65 (H) 03/12/2025     (H) 03/12/2025    VLDL 16 03/12/2025                                                                                                Health  Risk Assessment    Smoking Status:  Social History     Tobacco Use   Smoking Status Every Day    Current packs/day: 0.25    Average packs/day: 0.3 packs/day for 50.0 years (12.5 ttl pk-yrs)    Types: Cigarettes    Passive exposure: Current   Smokeless Tobacco Never   Tobacco Comments    5-6 CIG DAY     Alcohol Consumption:  Social History "     Substance and Sexual Activity   Alcohol Use Yes    Alcohol/week: 2.0 standard drinks of alcohol    Types: 2 Drinks containing 0.5 oz of alcohol per week    Comment: 1-2 per evening       Fall Risk Screen  DEMETRICEADI Fall Risk Assessment was completed, and patient is at LOW risk for falls.Assessment completed on:3/17/2025    Depression Screening   Little interest or pleasure in doing things? Not at all   Feeling down, depressed, or hopeless? Not at all   PHQ-2 Total Score 0      Health Habits and Functional and Cognitive Screening:      3/17/2025    11:07 AM   Functional & Cognitive Status   Do you have difficulty preparing food and eating? No   Do you have difficulty bathing yourself, getting dressed or grooming yourself? No   Do you have difficulty using the toilet? No   Do you have difficulty moving around from place to place? No   Do you have trouble with steps or getting out of a bed or a chair? No   Current Diet Well Balanced Diet   Dental Exam Up to date   Eye Exam Up to date   Exercise (times per week) 1 times per week   Do you need help using the phone?  No   Are you deaf or do you have serious difficulty hearing?  No   Do you need help to go to places out of walking distance? No   Do you need help shopping? No   Do you need help preparing meals?  No   Do you need help with housework?  No   Do you need help with laundry? No   Do you need help taking your medications? No   Do you need help managing money? No   Do you ever drive or ride in a car without wearing a seat belt? No   Have you felt unusual stress, anger or loneliness in the last month? No   Who do you live with? Alone   If you need help, do you have trouble finding someone available to you? No   Have you been bothered in the last four weeks by sexual problems? No   Do you have difficulty concentrating, remembering or making decisions? No           Age-appropriate Screening Schedule:  Refer to the list below for future screening recommendations based  "on patient's age, sex and/or medical conditions. Orders for these recommended tests are listed in the plan section. The patient has been provided with a written plan.    Health Maintenance List  Health Maintenance   Topic Date Due    ZOSTER VACCINE (1 of 2) Never done    ANNUAL WELLNESS VISIT  03/15/2025    COVID-19 Vaccine (10 - 2024-25 season) 03/18/2025    DXA SCAN  09/25/2025    LIPID PANEL  03/12/2026    TDAP/TD VACCINES (2 - Tdap) 11/05/2029    RSV Vaccine - Adults  Completed    INFLUENZA VACCINE  Completed    Pneumococcal Vaccine 50+  Completed    MAMMOGRAM  Discontinued                                                                                                                                                CMS Preventative Services Quick Reference  Risk Factors Identified During Encounter  Immunizations Discussed/Encouraged: Shingrix    The above risks/problems have been discussed with the patient.  Pertinent information has been shared with the patient in the After Visit Summary.  An After Visit Summary and PPPS were made available to the patient.    Follow Up:   Next Medicare Wellness visit to be scheduled in 1 year.         Additional E&M Note during same encounter follows:  Patient has additional, significant, and separately identifiable condition(s)/problem(s) that require work above and beyond the Medicare Wellness Visit     Chief Complaint  Annual Exam  Htn  Osteoporosis  Tob dependence  Lenin    Subjective   HPI  Trice is also being seen today for an annual adult preventative physical exam.  and Trice is also being seen today for additional medical problem/s. Has htn and reports normotensive bp at home. She does have tob dependence w continued cigarette smoking. She has osteopenia and is tolerating fosamax at this time. Started therapy in sep 24. Reflux is well managed w current meds.                   Objective   Vital Signs:  /88   Pulse 81   Ht 152.4 cm (60\")   Wt 54.4 kg (120 lb)   " SpO2 98%   BMI 23.44 kg/m²   Physical Exam  Vitals and nursing note reviewed.   Constitutional:       Appearance: Normal appearance. She is well-developed.   HENT:      Head: Normocephalic and atraumatic.      Right Ear: Tympanic membrane and external ear normal.      Left Ear: Tympanic membrane and external ear normal.      Nose: Nose normal.      Mouth/Throat:      Mouth: Mucous membranes are moist.   Eyes:      Extraocular Movements: Extraocular movements intact.      Pupils: Pupils are equal, round, and reactive to light.   Cardiovascular:      Rate and Rhythm: Normal rate and regular rhythm.      Pulses: Normal pulses.      Heart sounds: Normal heart sounds.   Pulmonary:      Effort: Pulmonary effort is normal. No respiratory distress.      Breath sounds: Normal breath sounds.   Abdominal:      General: Abdomen is flat.      Palpations: Abdomen is soft.   Musculoskeletal:         General: Normal range of motion.      Cervical back: Normal range of motion and neck supple.   Skin:     General: Skin is warm and dry.   Neurological:      General: No focal deficit present.      Mental Status: She is alert and oriented to person, place, and time.   Psychiatric:         Mood and Affect: Mood normal.         Behavior: Behavior normal.         Thought Content: Thought content normal.         Judgment: Judgment normal.         The following data was reviewed by: Maia Whitney MD on 03/17/2025:  Data reviewed : ct lung   Common labs          7/18/2024    04:17 7/18/2024    04:18 9/11/2024    09:17 3/12/2025    09:40   Common Labs   Glucose 103   88  93    BUN 7   9  8    Creatinine 0.52   0.72  0.75    Sodium 139   138  142    Potassium 3.6   5.1  3.7    Chloride 105   97  99    Calcium 8.5   10.3  9.5    Albumin    4.2    Total Bilirubin    0.6    Alkaline Phosphatase    81    AST (SGOT)    24    ALT (SGPT)    16    WBC  6.55   5.98    Hemoglobin  12.6   14.4    Hematocrit  38.3   43.5    Platelets  146   228    Total  Cholesterol    191    Triglycerides    86    HDL Cholesterol    65    LDL Cholesterol     110           Assessment and Plan Additional age appropriate preventative wellness advice topics were discussed during today's preventative wellness exam(some topics already addressed during AWV portion of the note above):    Physical Activity: Advised cardiovascular activity 150 minutes per week as tolerated. (example brisk walk for 30 minutes, 5 days a week).     Nutrition: Discussed nutrition plan with patient. Information shared in after visit summary. Goal is for a well balanced diet to enhance overall health.     Healthy Weight: Discussed current and goal BMI with patient. Steps to attain this goal discussed. Information shared in after visit summary.     Injury Prevention Discussion:  Information shared in after visit summary.           Screening for lung cancer    Orders:    CT Chest Low Dose Wo; Future    Cigarette smoker    Orders:    CT Chest Low Dose Wo; Future    Osteopenia, unspecified location  Continue ca, vit d, fosamax, light weight bearing exercises       Menopause    Orders:    DEXA Bone Density Axial    Healthcare maintenance  Referred for ct lung ca screening and dexa       Gastroesophageal reflux disease, unspecified whether esophagitis present  Avoid caffeine, nicotien, late night meals, spicy meals. Will try to further reduce PPI at next visit.                I spent 54 minutes caring for Trice on this date of service. This time includes time spent by me in the following activities:preparing for the visit, reviewing tests, obtaining and/or reviewing a separately obtained history, performing a medically appropriate examination and/or evaluation , counseling and educating the patient/family/caregiver, ordering medications, tests, or procedures, referring and communicating with other health care professionals , documenting information in the medical record, and independently interpreting results and  communicating that information with the patient/family/caregiver  Follow Up   No follow-ups on file.  Patient was given instructions and counseling regarding her condition or for health maintenance advice. Please see specific information pulled into the AVS if appropriate.

## 2025-03-17 NOTE — ASSESSMENT & PLAN NOTE
Avoid caffeine, nicotien, late night meals, spicy meals. Will try to further reduce PPI at next visit.

## 2025-03-26 DIAGNOSIS — Z78.0 MENOPAUSE: Primary | ICD-10-CM

## 2025-03-27 DIAGNOSIS — Z78.0 MENOPAUSE: Primary | ICD-10-CM

## 2025-05-28 RX ORDER — ALENDRONATE SODIUM 70 MG/1
70 TABLET ORAL WEEKLY
Qty: 12 TABLET | Refills: 3 | Status: SHIPPED | OUTPATIENT
Start: 2025-05-28

## 2025-05-29 RX ORDER — COLESTIPOL HYDROCHLORIDE 1 G/1
1 TABLET ORAL EVERY 12 HOURS SCHEDULED
Qty: 180 TABLET | Refills: 1 | Status: SHIPPED | OUTPATIENT
Start: 2025-05-29

## 2025-08-04 RX ORDER — LOPERAMIDE HYDROCHLORIDE 2 MG/1
2 TABLET ORAL 3 TIMES DAILY PRN
Qty: 30 TABLET | Refills: 2 | Status: SHIPPED | OUTPATIENT
Start: 2025-08-04

## (undated) DEVICE — BNDG ELAS CO-FLEX SLF ADHR 6IN 5YD LF STRL

## (undated) DEVICE — GLV SURG SENSICARE W/ALOE PF LF 8 STRL

## (undated) DEVICE — IMPLANTABLE DEVICE
Type: IMPLANTABLE DEVICE | Site: FEMUR | Status: NON-FUNCTIONAL
Removed: 2017-07-24

## (undated) DEVICE — PREP SOL POVIDONE/IODINE BT 4OZ

## (undated) DEVICE — GLV SURG BIOGEL LTX PF 8 1/2

## (undated) DEVICE — SUT MNCRYL 3/0 PS2 18IN MCP497G

## (undated) DEVICE — SOL NACL 0.9PCT 100ML SGL

## (undated) DEVICE — APPL DURAPREP IODOPHOR APL 26ML

## (undated) DEVICE — SUT MNCRYL 0 CT1 36IN Y946H

## (undated) DEVICE — SKIN PREP TRAY W/CHG: Brand: MEDLINE INDUSTRIES, INC.

## (undated) DEVICE — STPLR SKIN VISISTAT WD 35CT

## (undated) DEVICE — SPNG GZ STRL 2S 4X4 12PLY

## (undated) DEVICE — SPNG GZ WOVN 4X4IN 12PLY 10/BX STRL

## (undated) DEVICE — UNDERCAST PADDING: Brand: DEROYAL

## (undated) DEVICE — STERILE PATIENT PROTECTIVE PAD FOR IMP® KNEE POSITIONERS & COHESIVE WRAP (10 / CASE): Brand: DE MAYO KNEE POSITIONER®

## (undated) DEVICE — Device

## (undated) DEVICE — MAT FLR ABSORBENT LG 4FT 10 2.5FT

## (undated) DEVICE — INSTRUMENT BATTERY

## (undated) DEVICE — PK KN TOTL 40

## (undated) DEVICE — SUT VIC 1 CT1 36IN J947H

## (undated) DEVICE — GLV SURG SENSICARE MICRO PF LF 7 STRL

## (undated) DEVICE — NDL SPINE 22G 31/2IN BLK

## (undated) DEVICE — KT DRN EVAC WND PVC PCH WTROC RND 10F400

## (undated) DEVICE — GLV SURG PREMIERPRO ORTHO LTX PF SZ7.5 BRN

## (undated) DEVICE — BNDG ELAS ELITE V/CLOSE 6IN 5YD LF STRL

## (undated) DEVICE — SUT VIC 0 CT1 36IN J946H

## (undated) DEVICE — GLV SURG SENSICARE W/ALOE PF LF 7.5 STRL

## (undated) DEVICE — GLV SURG SENSICARE PI PF LF 7 GRN STRL

## (undated) DEVICE — 3M™ IOBAN™ 2 ANTIMICROBIAL INCISE DRAPE 6640EZ: Brand: IOBAN™ 2

## (undated) DEVICE — DRSNG ADAPTIC 3X8

## (undated) DEVICE — SOL ISO/ALC RUB 70PCT 4OZ

## (undated) DEVICE — PREMIUM WET SKIN PREP TRAY: Brand: MEDLINE INDUSTRIES, INC.

## (undated) DEVICE — GLV SURG TRIUMPH CLASSIC PF LTX 8.5 STRL

## (undated) DEVICE — INTENDED FOR TISSUE SEPARATION, AND OTHER PROCEDURES THAT REQUIRE A SHARP SURGICAL BLADE TO PUNCTURE OR CUT.: Brand: BARD-PARKER ® CARBON RIB-BACK BLADES

## (undated) DEVICE — GENESIS TROCHLEAR PIN 1/8 X 3: Brand: GENESIS

## (undated) DEVICE — DUAL CUT SAGITTAL BLADE

## (undated) DEVICE — DRSNG WND GZ CURAD OIL EMULSION 3X8IN LF STRL 1PK

## (undated) DEVICE — APPL CHLORAPREP W/TINT 26ML ORNG

## (undated) DEVICE — MEDI-VAC YANKAUER SUCTION HANDLE W/BULBOUS TIP: Brand: CARDINAL HEALTH

## (undated) DEVICE — ENCORE® LATEX ORTHO SIZE 7.5, STERILE LATEX POWDER-FREE SURGICAL GLOVE: Brand: ENCORE

## (undated) DEVICE — PK HIP PINNING 40

## (undated) DEVICE — LEGGINGS, PAIR, CLEAR, STERILE: Brand: MEDLINE